# Patient Record
Sex: MALE | Race: WHITE | Employment: UNEMPLOYED | URBAN - METROPOLITAN AREA
[De-identification: names, ages, dates, MRNs, and addresses within clinical notes are randomized per-mention and may not be internally consistent; named-entity substitution may affect disease eponyms.]

---

## 2018-05-11 ENCOUNTER — HOSPITAL ENCOUNTER (EMERGENCY)
Age: 58
Discharge: HOME | End: 2018-05-12
Attending: EMERGENCY MEDICINE
Payer: MEDICAID

## 2018-05-11 DIAGNOSIS — G89.29 OTHER CHRONIC PAIN: ICD-10-CM

## 2018-05-11 DIAGNOSIS — Z59.00 HOMELESS: ICD-10-CM

## 2018-05-11 DIAGNOSIS — I73.9 PAD (PERIPHERAL ARTERY DISEASE) (HCC): Primary | ICD-10-CM

## 2018-05-11 PROCEDURE — 99284 EMERGENCY DEPT VISIT MOD MDM: CPT

## 2018-05-11 RX ORDER — OXYCODONE HYDROCHLORIDE 5 MG/1
15 TABLET ORAL ONCE
Status: COMPLETED | OUTPATIENT
Start: 2018-05-12 | End: 2018-05-12

## 2018-05-11 SDOH — ECONOMIC STABILITY - HOUSING INSECURITY: HOMELESSNESS UNSPECIFIED: Z59.00

## 2018-05-12 ENCOUNTER — APPOINTMENT (OUTPATIENT)
Dept: GENERAL RADIOLOGY | Age: 58
End: 2018-05-12
Attending: EMERGENCY MEDICINE
Payer: MEDICAID

## 2018-05-12 VITALS
TEMPERATURE: 98 F | BODY MASS INDEX: 19.88 KG/M2 | OXYGEN SATURATION: 99 % | WEIGHT: 150 LBS | HEART RATE: 83 BPM | DIASTOLIC BLOOD PRESSURE: 64 MMHG | SYSTOLIC BLOOD PRESSURE: 136 MMHG | RESPIRATION RATE: 16 BRPM | HEIGHT: 73 IN

## 2018-05-12 LAB
ANION GAP SERPL CALC-SCNC: 8 MMOL/L (ref 3–18)
BASOPHILS # BLD: 0.1 K/UL (ref 0–0.06)
BASOPHILS NFR BLD: 1 % (ref 0–2)
BUN SERPL-MCNC: 8 MG/DL (ref 7–18)
BUN/CREAT SERPL: 12 (ref 12–20)
CALCIUM SERPL-MCNC: 9.1 MG/DL (ref 8.5–10.1)
CHLORIDE SERPL-SCNC: 102 MMOL/L (ref 100–108)
CO2 SERPL-SCNC: 30 MMOL/L (ref 21–32)
CREAT SERPL-MCNC: 0.67 MG/DL (ref 0.6–1.3)
DIFFERENTIAL METHOD BLD: ABNORMAL
EOSINOPHIL # BLD: 0.4 K/UL (ref 0–0.4)
EOSINOPHIL NFR BLD: 4 % (ref 0–5)
ERYTHROCYTE [DISTWIDTH] IN BLOOD BY AUTOMATED COUNT: 13.9 % (ref 11.6–14.5)
GLUCOSE SERPL-MCNC: 112 MG/DL (ref 74–99)
HCT VFR BLD AUTO: 36.3 % (ref 36–48)
HGB BLD-MCNC: 12.2 G/DL (ref 13–16)
LYMPHOCYTES # BLD: 1.8 K/UL (ref 0.9–3.6)
LYMPHOCYTES NFR BLD: 20 % (ref 21–52)
MCH RBC QN AUTO: 29 PG (ref 24–34)
MCHC RBC AUTO-ENTMCNC: 33.6 G/DL (ref 31–37)
MCV RBC AUTO: 86.2 FL (ref 74–97)
MONOCYTES # BLD: 0.9 K/UL (ref 0.05–1.2)
MONOCYTES NFR BLD: 9 % (ref 3–10)
NEUTS SEG # BLD: 6.1 K/UL (ref 1.8–8)
NEUTS SEG NFR BLD: 66 % (ref 40–73)
PLATELET # BLD AUTO: 373 K/UL (ref 135–420)
PMV BLD AUTO: 9.4 FL (ref 9.2–11.8)
POTASSIUM SERPL-SCNC: 3 MMOL/L (ref 3.5–5.5)
RBC # BLD AUTO: 4.21 M/UL (ref 4.7–5.5)
SODIUM SERPL-SCNC: 140 MMOL/L (ref 136–145)
WBC # BLD AUTO: 9.2 K/UL (ref 4.6–13.2)

## 2018-05-12 PROCEDURE — 80048 BASIC METABOLIC PNL TOTAL CA: CPT | Performed by: EMERGENCY MEDICINE

## 2018-05-12 PROCEDURE — 85025 COMPLETE CBC W/AUTO DIFF WBC: CPT | Performed by: EMERGENCY MEDICINE

## 2018-05-12 PROCEDURE — 73630 X-RAY EXAM OF FOOT: CPT

## 2018-05-12 PROCEDURE — 74011250637 HC RX REV CODE- 250/637: Performed by: EMERGENCY MEDICINE

## 2018-05-12 RX ORDER — OXYCODONE AND ACETAMINOPHEN 5; 325 MG/1; MG/1
1 TABLET ORAL
Qty: 6 TAB | Refills: 0 | Status: SHIPPED | OUTPATIENT
Start: 2018-05-12 | End: 2018-05-18 | Stop reason: SDUPTHER

## 2018-05-12 RX ORDER — POTASSIUM CHLORIDE 20 MEQ/1
TABLET, EXTENDED RELEASE ORAL
Status: DISCONTINUED
Start: 2018-05-12 | End: 2018-05-12 | Stop reason: HOSPADM

## 2018-05-12 RX ORDER — POTASSIUM CHLORIDE 20 MEQ/1
40 TABLET, EXTENDED RELEASE ORAL
Status: COMPLETED | OUTPATIENT
Start: 2018-05-12 | End: 2018-05-12

## 2018-05-12 RX ADMIN — POTASSIUM CHLORIDE 40 MEQ: 20 TABLET, EXTENDED RELEASE ORAL at 01:31

## 2018-05-12 RX ADMIN — OXYCODONE HYDROCHLORIDE 15 MG: 5 TABLET ORAL at 00:07

## 2018-05-12 NOTE — DISCHARGE INSTRUCTIONS
Learning About Peripheral Arterial Disease of the Legs  What is peripheral arterial disease? Peripheral arterial disease (PAD) is narrowing or blockage of arteries in your arms and legs. The most common cause of PAD is the buildup of plaque on the inside of arteries. Plaque is made of extra cholesterol, calcium, and other material in your blood. Over time, plaque builds up in the walls of the arteries, including those that supply blood to your legs. This buildup leads to poor blood flow. When you have PAD, you have a risk of having plaque in other arteries in your body. This raises your risk of a heart attack and stroke. This information focuses on peripheral arterial disease of the legs, the area where it is most common. When you have PAD of the legs and you walk or exercise, your leg muscles may not get enough blood. This may cause symptoms, such as leg pain during exercise. Peripheral arterial disease is also called peripheral vascular disease. What are the symptoms? Many people who have PAD do not have any symptoms. But if you have symptoms, you may have weak or tired legs, difficulty walking or balancing, or pain. If you have pain, you might feel a tight, aching, or squeezing pain in the calf, thigh, or buttock. This pain usually happens after you have walked a certain distance. The pain goes away if you stop walking. This pain is called intermittent claudication. If PAD gets worse, you may have other symptoms that are caused by poor blood flow to your legs and feet. You may have:  · Cold or numb feet or toes. · Sores that are slow to heal.  · Leg or foot pain while you are at rest.  · Feet and toes that become pale from exercise or when elevated. · Feet that turn red when dangled. · Blue or purple marks on your legs, feet, or toes. How can you prevent PAD? · Quit smoking. Quitting smoking is one of the best things you can do to help prevent PAD.  If you need help quitting, talk to your doctor about stop-smoking programs and medicines. These can increase your chances of quitting for good. · Stay at a healthy weight. · Manage other health problems, including diabetes, high blood pressure, and high cholesterol. · Be physically active. Get at least 30 minutes of exercise on most days of the week. Walking is a good choice. You also may want to do other activities, such as running, swimming, cycling, or playing tennis or team sports. · Eat a variety of heart-healthy foods. ¨ Eat fruits, vegetables, whole grains (like oatmeal), dried beans and peas, nuts and seeds, soy products (like tofu), and fat-free or low-fat dairy products. ¨ Replace butter, margarine, and hydrogenated or partially hydrogenated oils with olive and canola oils. (Canola oil margarine without trans fat is fine.)  ¨ Replace red meat with fish, poultry, and soy protein (like tofu). ¨ Limit processed and packaged foods like chips, crackers, and cookies. How is PAD treated? Your doctor may suggest ways to relieve symptoms and lower your risk of heart attack and stroke. These may include:  · Quitting smoking. It's one of the most important things you can do. If you need help quitting, talk to your doctor about stop-smoking programs and medicines. These can increase your chances of quitting for good. · Eating heart-healthy foods. · Staying at a healthy weight. Lose weight if you need to. · Regular exercise (if your doctor says it's safe). Try walking, swimming, or biking for at least 30 minutes on most, if not all, days of the week. If you have leg symptoms when you exercise, your doctor might recommend a specialized exercise program that may relieve symptoms. The goal is to be able to walk farther without pain. · Medicines that help manage other problems such as high blood pressure and high cholesterol. · Medicine, such as aspirin, that prevents blood clots which could cause a heart attack or stroke.   · Procedures, such as opening narrowed or blocked arteries (angioplasty) or using healthy blood vessels to create detours around narrowed or blocked arteries (bypass surgery). Follow-up care is a key part of your treatment and safety. Be sure to make and go to all appointments, and call your doctor if you are having problems. It's also a good idea to know your test results and keep a list of the medicines you take. Where can you learn more? Go to http://krystal-jenny.info/. Enter S971 in the search box to learn more about \"Learning About Peripheral Arterial Disease of the Legs. \"  Current as of: September 21, 2016  Content Version: 11.4  © 4009-4890 Healthwise, [employer]. Care instructions adapted under license by [employer] (which disclaims liability or warranty for this information). If you have questions about a medical condition or this instruction, always ask your healthcare professional. [place] [address] any warranty or liability for your use of this information.

## 2018-05-12 NOTE — ED PROVIDER NOTES
EMERGENCY DEPARTMENT HISTORY AND PHYSICAL EXAM    11:57 PM      Date: 5/11/2018  Patient Name: Zhang Rosen    History of Presenting Illness     Chief Complaint   Patient presents with    Foot Pain         History Provided By: Patient    Chief Complaint: Bilateral leg and foot pain  Duration:  Days (x1)  Timing:  Acute  Location: Bilateral legs and feet  Quality: N/A  Severity: N/A  Modifying Factors: Initial relief with oxycodone and tramadol  Associated Symptoms: denies fever and cough      Additional History (Context): Zhang Rosen is a 63 y.o. male with hypertension who presents to the ED c/o bilateral leg and foot pain onset today. Pt is two months s/p left transmetatarsal amputation; pt was not on antibiotics for the first four months following surgery however was eventually put on a course of antibiotics for infection of amputation site. Pt was staying at [facility] following this surgery but was discharged two days ago; pt has been living in his car since being discharged and does not have anywhere to go at this time. Pt was taking oxycodone and tramadol while at [facility], which controlled his pain well, however pt has not had these medications the last two days. Pt denies fever and cough. PCP: Robert Manzo MD        Past History     Past Medical History:  Past Medical History:   Diagnosis Date    Hypertension        Past Surgical History:  History reviewed. No pertinent surgical history. Family History:  History reviewed. No pertinent family history. Social History:  Social History   Substance Use Topics    Smoking status: Current Every Day Smoker    Smokeless tobacco: Never Used    Alcohol use No       Allergies:  No Known Allergies      Review of Systems     Review of Systems   Constitutional: Negative for fever. Respiratory: Negative for cough.     Musculoskeletal:        Positive for bilateral leg and foot pain   All other systems reviewed and are negative. Physical Exam     Visit Vitals    /64    Pulse 83    Temp 98 °F (36.7 °C)    Resp 16    Ht 6' 1\" (1.854 m)    Wt 68 kg (150 lb)    SpO2 99%    BMI 19.79 kg/m2         Physical Exam   Constitutional:   General:  Well-developed, well-nourished, no apparent distress. Head:  Normocephalic atraumatic. Eyes:  Pupils midrange extraocular movements intact. No pallor or conjunctival injection. Nose:  No rhinorrhea, inspection grossly normal.    Ears:  Grossly normal to inspection, no discharge. Mouth:  Mucous membranes moist, no appreciable intraoral lesion. Neck/Back:  Trachea midline, no asymmetry. Chest:  Grossly normal inspection, symmetric chest rise. Pulmonary:  Clear to auscultation bilaterally no wheezes rhonchi or rales. Cardiovascular:  S1-S2 no murmurs rubs or gallops. Abdomen: Soft, nontender, nondistended no guarding rebound or peritoneal signs. Extremities:  Grossly normal to inspection, peripheral pulses intact  LEFT lower extremity covered with foul smelling dressing, status post TMA, with dehiscence, granulation tissue present in the LEFT TMA, 2+ dorsalis pedis pulse, RIGHT lower extremity, faintly palpable posterior tibial pulse, very mild pallor, sluggish cap refill but no erythema no pain on palpation  Neurologic:  Alert and oriented no appreciable focal neurologic deficit. Skin:  Warm and dry  Psychiatric:  Grossly normal mood and affect. Nursing note reviewed, vital signs reviewed.            Diagnostic Study Results     Labs -  Recent Results (from the past 12 hour(s))   CBC WITH AUTOMATED DIFF    Collection Time: 05/12/18 12:21 AM   Result Value Ref Range    WBC 9.2 4.6 - 13.2 K/uL    RBC 4.21 (L) 4.70 - 5.50 M/uL    HGB 12.2 (L) 13.0 - 16.0 g/dL    HCT 36.3 36.0 - 48.0 %    MCV 86.2 74.0 - 97.0 FL    MCH 29.0 24.0 - 34.0 PG    MCHC 33.6 31.0 - 37.0 g/dL    RDW 13.9 11.6 - 14.5 %    PLATELET 588 595 - 655 K/uL    MPV 9.4 9.2 - 11.8 FL NEUTROPHILS 66 40 - 73 %    LYMPHOCYTES 20 (L) 21 - 52 %    MONOCYTES 9 3 - 10 %    EOSINOPHILS 4 0 - 5 %    BASOPHILS 1 0 - 2 %    ABS. NEUTROPHILS 6.1 1.8 - 8.0 K/UL    ABS. LYMPHOCYTES 1.8 0.9 - 3.6 K/UL    ABS. MONOCYTES 0.9 0.05 - 1.2 K/UL    ABS. EOSINOPHILS 0.4 0.0 - 0.4 K/UL    ABS. BASOPHILS 0.1 (H) 0.0 - 0.06 K/UL    DF AUTOMATED     METABOLIC PANEL, BASIC    Collection Time: 05/12/18 12:21 AM   Result Value Ref Range    Sodium 140 136 - 145 mmol/L    Potassium 3.0 (L) 3.5 - 5.5 mmol/L    Chloride 102 100 - 108 mmol/L    CO2 30 21 - 32 mmol/L    Anion gap 8 3.0 - 18 mmol/L    Glucose 112 (H) 74 - 99 mg/dL    BUN 8 7.0 - 18 MG/DL    Creatinine 0.67 0.6 - 1.3 MG/DL    BUN/Creatinine ratio 12 12 - 20      GFR est AA >60 >60 ml/min/1.73m2    GFR est non-AA >60 >60 ml/min/1.73m2    Calcium 9.1 8.5 - 10.1 MG/DL       Radiologic Studies -   XR FOOT LT MIN 3 V        S/p TMA with no prior xray to compare as read by Severa Earthly, MD 12:43 AM          Medical Decision Making   I am the first provider for this patient. I reviewed the vital signs, available nursing notes, past medical history, past surgical history, family history and social history. Vital Signs-Reviewed the patient's vital signs. Pulse Oximetry Analysis -  97% on room air (Non-hypoxic)     Cardiac Monitor:  Rate: 83  Rhythm:  Normal Sinus Rhythm     Records Reviewed: Nursing Notes and Old Medical Records (Time of Review: 11:57 PM)    ED Course: Progress Notes, Reevaluation, and Consults:    ED course:  Patient with a history of chronic pain, peripheral arterial disease and LEFT TMA presents with continued pain, was discharged from his rehabilitation facility with no place to live and no pain medication.   Per EMR he does take oxycodone 15 mg, wound was seen recently by his podiatrist last vascular doctor has a known dehiscence and has finished a course of antibiotic at his care facility    Given pain medication    Labs significant for hypokalemia replaced by mouth white count not elevated    No indication for admission, patient was referred back to his primary vascular or PCP or pain management for further management of his chronic pain and for living facility, had a plan to see consulate on Monday for a place to live    Patient's presentation, history, physical exam and laboratory evaluations were reviewed. At this time patient was felt to be stable for outpatient management and follow with primary care/specialist.  Patient was instructed to return to the emergency department with any concerns. Disposition:    Discharged home      Portions of this chart were created with Dragon medical speech to text program.   Unrecognized errors may be present. Follow-up Information     Follow up With Details Comments Ruby Juárez MD In 2 days  [facility].  [address] [place] [address] Call in 2 days  [address] [address]) [phone]    [facility] EMERGENCY DEPT  As needed, If symptoms worsen [address]  [phone]           Discharge Medication List as of 5/12/2018  1:25 AM      START taking these medications    Details   oxyCODONE-acetaminophen (PERCOCET) 5-325 mg per tablet Take 1 Tab by mouth every four (4) hours as needed for Pain. Max Daily Amount: 6 Tabs., Print, Disp-6 Tab, R-0           _______________________________    Attestations:  Scribe [address] [place] [address] acting as a scribe for and in the presence of Natasha Mart MD      May 11, 2018 at 11:57 PM       Provider Attestation:      I personally performed the services described in the documentation, reviewed the documentation, as recorded by the scribe in my presence, and it accurately and completely records my words and actions.  May 11, 2018 at 11:57 PM - Natasha Mart MD    _______________________________

## 2018-05-14 ENCOUNTER — OFFICE VISIT (OUTPATIENT)
Dept: INTERNAL MEDICINE CLINIC | Age: 58
End: 2018-05-14

## 2018-05-14 VITALS
HEIGHT: 74 IN | BODY MASS INDEX: 18.61 KG/M2 | TEMPERATURE: 97.3 F | RESPIRATION RATE: 16 BRPM | OXYGEN SATURATION: 99 % | SYSTOLIC BLOOD PRESSURE: 171 MMHG | HEART RATE: 90 BPM | DIASTOLIC BLOOD PRESSURE: 103 MMHG | WEIGHT: 145 LBS

## 2018-05-14 RX ORDER — POLYETHYLENE GLYCOL 3350 17 G/17G
17 POWDER, FOR SOLUTION ORAL DAILY
COMMUNITY
End: 2019-01-11

## 2018-05-14 RX ORDER — VENLAFAXINE 75 MG/1
25 TABLET ORAL DAILY
COMMUNITY
End: 2018-05-18 | Stop reason: DRUGHIGH

## 2018-05-14 RX ORDER — LANOLIN ALCOHOL/MO/W.PET/CERES
CREAM (GRAM) TOPICAL
COMMUNITY
End: 2019-01-11

## 2018-05-14 RX ORDER — AMLODIPINE BESYLATE 10 MG/1
TABLET ORAL DAILY
COMMUNITY
End: 2018-05-18 | Stop reason: SDUPTHER

## 2018-05-14 NOTE — PROGRESS NOTES
Jasper aBss presents today for   Chief Complaint   Patient presents with    New Patient     Pt stated he d/c himself from [facility] Friday. Pt stated he went to [address] [place] [address] and was referred him to to long term care and they referred him here. Pt stated he needs refills on his pain meds. Jasper Bass preferred language for health care discussion is english/other. Is someone accompanying this pt? No    Is the patient using any DME equipment during OV? Yes; orthopedic shoe left foot    Depression Screening:  Completed     Learning Assessment:  Completed    Abuse Screening:  No flowsheet data found. Fall Risk  No flowsheet data found. Health Maintenance reviewed and discussed per provider. Yes    Jasper Bass is due for HM. Deferred. Advance Directive:  1. Do you have an advance directive in place? Patient Reply: No     2. If not, would you like material regarding how to put one in place? Patient Reply: No     Coordination of Care:  1. Have you been to the ER, urgent care clinic since your last visit? Hospitalized since your last visit? Yes;  General for sx     2. Have you seen or consulted any other health care providers outside of the [address] [place] [address] since your last visit? Include any pap smears or colon screening. Dr Gi Wilson and Dr Nayak Hascolette          A user error has taken place.

## 2018-05-18 ENCOUNTER — OFFICE VISIT (OUTPATIENT)
Dept: INTERNAL MEDICINE CLINIC | Age: 58
End: 2018-05-18

## 2018-05-18 VITALS
HEIGHT: 74 IN | OXYGEN SATURATION: 98 % | DIASTOLIC BLOOD PRESSURE: 93 MMHG | HEART RATE: 82 BPM | BODY MASS INDEX: 18.3 KG/M2 | RESPIRATION RATE: 18 BRPM | TEMPERATURE: 97.6 F | SYSTOLIC BLOOD PRESSURE: 165 MMHG | WEIGHT: 142.6 LBS

## 2018-05-18 DIAGNOSIS — G89.29 OTHER CHRONIC PAIN: ICD-10-CM

## 2018-05-18 DIAGNOSIS — F33.9 RECURRENT MAJOR DEPRESSIVE DISORDER, REMISSION STATUS UNSPECIFIED (HCC): ICD-10-CM

## 2018-05-18 DIAGNOSIS — I10 ESSENTIAL HYPERTENSION: Primary | ICD-10-CM

## 2018-05-18 DIAGNOSIS — F17.200 TOBACCO DEPENDENCE: ICD-10-CM

## 2018-05-18 DIAGNOSIS — Z59.00 HOMELESSNESS: ICD-10-CM

## 2018-05-18 DIAGNOSIS — I73.9 PAD (PERIPHERAL ARTERY DISEASE) (HCC): ICD-10-CM

## 2018-05-18 RX ORDER — TRAMADOL HYDROCHLORIDE 50 MG/1
50 TABLET ORAL
COMMUNITY
Start: 2018-03-15 | End: 2018-05-18

## 2018-05-18 RX ORDER — OXYCODONE AND ACETAMINOPHEN 5; 325 MG/1; MG/1
1 TABLET ORAL
Qty: 90 TAB | Refills: 0 | Status: SHIPPED | OUTPATIENT
Start: 2018-05-18 | End: 2018-06-08 | Stop reason: SDUPTHER

## 2018-05-18 RX ORDER — OXYCODONE HYDROCHLORIDE 15 MG/1
15 TABLET ORAL
COMMUNITY
Start: 2018-03-15 | End: 2018-05-18

## 2018-05-18 RX ORDER — AMLODIPINE BESYLATE 10 MG/1
10 TABLET ORAL DAILY
Qty: 90 TAB | Refills: 3 | Status: SHIPPED | OUTPATIENT
Start: 2018-05-18 | End: 2019-01-11

## 2018-05-18 RX ORDER — VENLAFAXINE HYDROCHLORIDE 150 MG/1
150 CAPSULE, EXTENDED RELEASE ORAL DAILY
Qty: 90 CAP | Refills: 3 | Status: SHIPPED | OUTPATIENT
Start: 2018-05-18 | End: 2018-06-08 | Stop reason: SDUPTHER

## 2018-05-18 SDOH — ECONOMIC STABILITY - HOUSING INSECURITY: HOMELESSNESS UNSPECIFIED: Z59.00

## 2018-05-18 NOTE — PROGRESS NOTES
ROOM # [id number]  Identified pt with two pt identifiers(name and ). Reviewed record in preparation for visit and have obtained necessary documentation. Chief Complaint   Patient presents with   [address] [place] [address]     recent amputation of toes lt ft      Christiano Mckeon preferred language for health care discussion is english/other. Is the patient using any DME equipment during OV? Genoveva Whitney is due for:  Health Maintenance Due   Topic    Hepatitis C Screening     Pneumococcal 19-64 Medium Risk (1 of 1 - PPSV23)    DTaP/Tdap/Td series (1 - Tdap)    FOBT Q 1 YEAR AGE 50-75      Health Maintenance reviewed and discussed per provider  Please order/place referral if appropriate. Advance Directive:  1. Do you have an advance directive in place? Patient Reply: NO    2. If not, would you like material regarding how to put one in place? NO    Coordination of Care:  1. Have you been to the ER, urgent care clinic since your last visit? Hospitalized since your last visit? YES [facility]    2. Have you seen or consulted any other health care providers outside of the [address] [place] [address] since your last visit? Include any pap smears or colon screening. NO    Patient is accompanied by self I have received verbal consent from Christiano Mckeon to discuss any/all medical information while they are present in the room. Learning Assessment:  Learning Assessment 2018   PRIMARY LEARNER Patient   HIGHEST LEVEL OF EDUCATION - PRIMARY LEARNER  GRADUATED HIGH SCHOOL OR GED   BARRIERS PRIMARY LEARNER NONE   CO-LEARNER CAREGIVER No   PRIMARY LANGUAGE ENGLISH   LEARNER PREFERENCE PRIMARY DEMONSTRATION   ANSWERED BY patient   RELATIONSHIP SELF     Depression Screening:  PHQ over the last two weeks 2018   Little interest or pleasure in doing things Not at all Not at all   Feeling down, depressed or hopeless Not at all Not at all   Total Score PHQ 2 0 0     Abuse Screening:  No flowsheet data found.    Risk  No flowsheet data found.

## 2018-05-18 NOTE — PATIENT INSTRUCTIONS
Learning About Prescribed Opioid Medicine for Chronic Pain  Introduction    Opioids are medicines used to relieve moderate to severe pain. Examples include fentanyl, hydrocodone, morphine, and oxycodone. Heroin is an example of an illegal opioid. They may be used for pain that may go on for a long time, such as for cancer or arthritis. Opioids relieve pain by changing the way your body feels pain and the way you feel about pain. They don't cure a health problem. But they do help you manage the pain. Your doctor will talk with you about how they fit into your overall treatment plan. Doctors follow a strict set of rules for giving opioids to their patients who have long-term (chronic) pain. This is because these are powerful medicines. They can cause problems if they are not used correctly. They can also be misused. Getting a prescription for an opioid may seem hard to do. You may feel like your doctor doesn't trust you. Your doctor understands this. But the rules are the same for everyone. They help make sure that the medicine will be used safely. What happens before you get a prescription? Your doctor may talk with you about your pain history. He or she may ask about your family history. You may have a physical exam. You may also see a pain specialist. This helps your doctor decide if an opioid is right for you. Your doctor will also review your history of opioid use. Most states have a program that tracks prescriptions. If your state does, your doctor will check it to see if you've had prescriptions for opioids before. You may have a urine test to check for opioids in your system. If you're a woman, you may have a pregnancy test. Opioids are not safe to take if you are pregnant. When your doctor prescribes the medicine, he or she will discuss your treatment plan with you. That includes the risks and benefits of opioid medicines.  Your doctor will tell you how much pain relief and improved ability to function you can expect. The goal is to take the lowest dose that improves your pain for the shortest amount of time. Your doctor will also talk with you about other things you can do to help relieve pain. Your doctor may suggest different medicines or other options, such as steroid injections, ice or heat, physical therapy, or ways to reduce stress. It's always a good idea to ask questions before you get a new prescription. This is especially true when you get a prescription for an opioid. Some questions to ask your doctor include:  · Why do I need this medicine? Is it right for me? · How long should I take this medicine? · What can I do to help with side effects, like constipation? · How should I store this medicine? What should I do with leftover or unused opioid medicine? · Do I need a prescription for naloxone? You and your doctor will talk about your responsibilities. You'll both sign a written agreement. You will agree to take your medicine exactly as your doctor tells you to. Nikki Stanley also agree to be careful with it and not to share it with others. What happens after you start to use your medicine? Regular follow-up visits to your doctor will help you and your doctor make sure that the medicine is the right treatment for your pain. At every visit, your doctor will check these things:  · Is your pain being controlled? · Are you better able to function and be active? · Are you having any side effects, like constipation, nausea, or being too sleepy? · Are the non-opioid pain control measures working? Are there other things you can try? · Are there any signs that you are misusing your medicine? While you are taking an opioid, you may have drug tests and prescription history checks from time to time. Follow-up care is a key part of your treatment and safety. Be sure to make and go to all appointments, and call your doctor if you are having problems.  It's also a good idea to know your test results and keep a list of the medicines you take. Where can you learn more? Go to http://krystal-jenny.info/. Enter D374 in the search box to learn more about \"Learning About Prescribed Opioid Medicine for Chronic Pain. \"  Current as of: October 14, 2016  Content Version: 11.4  © 8763-6272 [employer]. Care instructions adapted under license by [employer] (which disclaims liability or warranty for this information). If you have questions about a medical condition or this instruction, always ask your healthcare professional. [address] any warranty or liability for your use of this information. Stopping Smoking: Care Instructions  Your Care Instructions    Cigarette smokers crave the nicotine in cigarettes. Giving it up is much harder than simply changing a habit. Your body has to stop craving the nicotine. It is hard to quit, but you can do it. There are many tools that people use to quit smoking. You may find that combining tools works best for you. There are several steps to quitting. First you get ready to quit. Then you get support to help you. After that, you learn new skills and behaviors to become a nonsmoker. For many people, a necessary step is getting and using medicine. Your doctor will help you set up the plan that best meets your needs. You may want to attend a smoking cessation program to help you quit smoking. When you choose a program, look for one that has proven success. Ask your doctor for ideas. You will greatly increase your chances of success if you take medicine as well as get counseling or join a cessation program.  Some of the changes you feel when you first quit tobacco are uncomfortable. Your body will miss the nicotine at first, and you may feel short-tempered and grumpy. You may have trouble sleeping or concentrating. Medicine can help you deal with these symptoms.  You may struggle with changing your smoking habits and rituals. The last step is the tricky one: Be prepared for the smoking urge to continue for a time. This is a lot to deal with, but keep at it. You will feel better. Follow-up care is a key part of your treatment and safety. Be sure to make and go to all appointments, and call your doctor if you are having problems. It's also a good idea to know your test results and keep a list of the medicines you take. How can you care for yourself at home? · Ask your family, friends, and coworkers for support. You have a better chance of quitting if you have help and support. · Join a support group, such as Nicotine Anonymous, for people who are trying to quit smoking. · Consider signing up for a smoking cessation program, such as the American [employer] Association's Freedom from Smoking program.  · Set a quit date. Pick your date carefully so that it is not right in the middle of a big deadline or stressful time. Once you quit, do not even take a puff. Get rid of all ashtrays and lighters after your last cigarette. Clean your house and your clothes so that they do not smell of smoke. · Learn how to be a nonsmoker. Think about ways you can avoid those things that make you reach for a cigarette. ¨ Avoid situations that put you at greatest risk for smoking. For some people, it is hard to have a drink with friends without smoking. For others, they might skip a coffee break with coworkers who smoke. ¨ Change your daily routine. Take a different route to work or eat a meal in a different place. · Cut down on stress. Calm yourself or release tension by doing an activity you enjoy, such as reading a book, taking a hot bath, or gardening. · Talk to your doctor or pharmacist about nicotine replacement therapy, which replaces the nicotine in your body. You still get nicotine but you do not use tobacco. Nicotine replacement products help you slowly reduce the amount of nicotine you need.  These products come in several forms, many of them available over-the-counter:  ¨ Nicotine patches  ¨ Nicotine gum and lozenges  ¨ Nicotine inhaler  · Ask your doctor about bupropion (Wellbutrin) or varenicline (Chantix), which are prescription medicines. They do not contain nicotine. They help you by reducing withdrawal symptoms, such as stress and anxiety. · Some people find hypnosis, acupuncture, and massage helpful for ending the smoking habit. · Eat a healthy diet and get regular exercise. Having healthy habits will help your body move past its craving for nicotine. · Be prepared to keep trying. Most people are not successful the first few times they try to quit. Do not get mad at yourself if you smoke again. Make a list of things you learned and think about when you want to try again, such as next week, next month, or next year. Where can you learn more? Go to http://krystalRummble Labsjenny.info/. Enter N053 in the search box to learn more about \"Stopping Smoking: Care Instructions. \"  Current as of: March 20, 2017  Content Version: 11.4  © 9507-7001 [employer]. Care instructions adapted under license by [employer] (which disclaims liability or warranty for this information). If you have questions about a medical condition or this instruction, always ask your healthcare professional. [address] any warranty or liability for your use of this information. Body Mass Index: Care Instructions  Your Care Instructions    Body mass index (BMI) can help you see if your weight is raising your risk for health problems. It uses a formula to compare how much you weigh with how tall you are. · A BMI lower than 18.5 is considered underweight. · A BMI between 18.5 and 24.9 is considered healthy. · A BMI between 25 and 29.9 is considered overweight. A BMI of 30 or higher is considered obese.   If your BMI is in the normal range, it means that you have a lower risk for weight-related health problems. If your BMI is in the overweight or obese range, you may be at increased risk for weight-related health problems, such as high blood pressure, heart disease, stroke, arthritis or joint pain, and diabetes. If your BMI is in the underweight range, you may be at increased risk for health problems such as fatigue, lower protection (immunity) against illness, muscle loss, bone loss, hair loss, and hormone problems. BMI is just one measure of your risk for weight-related health problems. You may be at higher risk for health problems if you are not active, you eat an unhealthy diet, or you drink too much alcohol or use tobacco products. Follow-up care is a key part of your treatment and safety. Be sure to make and go to all appointments, and call your doctor if you are having problems. It's also a good idea to know your test results and keep a list of the medicines you take. How can you care for yourself at home? · Practice healthy eating habits. This includes eating plenty of fruits, vegetables, whole grains, lean protein, and low-fat dairy. · If your doctor recommends it, get more exercise. Walking is a good choice. Bit by bit, increase the amount you walk every day. Try for at least 30 minutes on most days of the week. · Do not smoke. Smoking can increase your risk for health problems. If you need help quitting, talk to your doctor about stop-smoking programs and medicines. These can increase your chances of quitting for good. · Limit alcohol to 2 drinks a day for men and 1 drink a day for women. Too much alcohol can cause health problems. If you have a BMI higher than 25  · Your doctor may do other tests to check your risk for weight-related health problems. This may include measuring the distance around your waist. A waist measurement of more than 40 inches in men or 35 inches in women can increase the risk of weight-related health problems.   · Talk with your doctor about steps you can take to stay healthy or improve your health. You may need to make lifestyle changes to lose weight and stay healthy, such as changing your diet and getting regular exercise. If you have a BMI lower than 18.5  · Your doctor may do other tests to check your risk for health problems. · Talk with your doctor about steps you can take to stay healthy or improve your health. You may need to make lifestyle changes to gain or maintain weight and stay healthy, such as getting more healthy foods in your diet and doing exercises to build muscle. Where can you learn more? Go to http://krystal-jenny.info/. Enter S176 in the search box to learn more about \"Body Mass Index: Care Instructions. \"  Current as of: October 13, 2016  Content Version: 11.4  © 2697-5762 [employer]. Care instructions adapted under license by [employer] (which disclaims liability or warranty for this information). If you have questions about a medical condition or this instruction, always ask your healthcare professional. [address] any warranty or liability for your use of this information.

## 2018-05-18 NOTE — MR AVS SNAPSHOT
[address] [place] [address] 
 
 
 [address] [address] 
[phone] Patient: Spencer Bhakta MRN: [id number] PRR:[id number]/8/[id number] Visit Information Date & Time Provider Department Dept. Phone Encounter #  
 5/18/2018  3:00 PM Gayle Machado, [facility] [id number] Follow-up Instructions Return in about 1 month (around 6/18/2018), or if symptoms worsen or fail to improve, for hypertension, pain. Your Appointments 5/18/2018  3:00 PM  
Office Visit with MD ROSI Sethi [facility]) Appt Note: Est Care. recent amputation of toes (L) foot. [address] [facility]  
  
   
 [address] [facility] Upcoming Health Maintenance Date Due Hepatitis C Screening 1960 Pneumococcal 19-64 Medium Risk (1 of 1 - PPSV23) 3/1/1980 DTaP/Tdap/Td series (1 - Tdap) 3/1/1982 FOBT Q 1 YEAR AGE 50-75 3/1/2011 Influenza Age 5 to Adult 8/1/2018 Allergies as of 5/18/2018  Review Complete On: 5/18/2018 By: Kamran Perez No Known Allergies Current Immunizations  Never Reviewed No immunizations on file. Not reviewed this visit You Were Diagnosed With   
  
 Codes Comments Essential hypertension    -  Primary ICD-10-CM: I10 
ICD-9-CM: 401.9 PAD (peripheral artery disease) (HCC)     ICD-10-CM: I73.9 ICD-9-CM: 443.9 Toe amputation status, left ([facility]     ICD-10-CM: G65.043 ICD-9-CM: V49.72 Recurrent major depressive disorder, remission status unspecified ([facility]     ICD-10-CM: F33.9 ICD-9-CM: 296.30 Other chronic pain     ICD-10-CM: G89.29 ICD-9-CM: 338.29 Homelessness     ICD-10-CM: Z59.0 ICD-9-CM: V60.0 Tobacco dependence     ICD-10-CM: L24.380 ICD-9-CM: 305.1 Vitals BP Pulse Temp Resp Height(growth percentile) Weight(growth percentile) (!) 165/93 (BP 1 Location: Left arm, BP Patient Position: Sitting) 82 97.6 °F (36.4 °C) (Oral) 18 6' 2\" (1.88 m) 142 lb 9.6 oz (64.7 kg) SpO2 BMI Smoking Status 98% 18.31 kg/m2 Current Every Day Smoker Vitals History BMI and BSA Data Body Mass Index Body Surface Area  
 18.31 kg/m 2 1.84 m 2 Preferred Pharmacy Pharmacy Name Phone [id number] [place], [address] [facility] Lindsay Young [phone] Your Updated Medication List  
  
   
This list is accurate as of 5/18/18  9:47 AM.  Always use your most recent med list. amLODIPine 10 mg tablet Commonly known as:  Edelmira Anita Take 1 Tab by mouth daily. Indications: hypertension  
  
 melatonin 3 mg tablet Take  by mouth. MIRALAX 17 gram packet Generic drug:  polyethylene glycol Take 17 g by mouth daily. MORPHINE  
1 Tab two (2) times a day. Morphine sulfate  15 mg  
  
 oxyCODONE-acetaminophen 5-325 mg per tablet Commonly known as:  PERCOCET Take 1 Tab by mouth every eight (8) hours as needed for Pain. Max Daily Amount: 3 Tabs. therapeutic multivitamin-minerals tablet Commonly known as:  THERAGRAN-M Take 1 Tab by Mouth Once a Day. venlafaxine- mg capsule Commonly known as:  EFFEXOR-XR Take 1 Cap by mouth daily. Indications: ANXIETY WITH DEPRESSION Prescriptions Printed Refills  
 oxyCODONE-acetaminophen (PERCOCET) 5-325 mg per tablet 0 Sig: Take 1 Tab by mouth every eight (8) hours as needed for Pain. Max Daily Amount: 3 Tabs. Class: Print Route: Oral  
  
Prescriptions Sent to Pharmacy Refills  
 amLODIPine (NORVASC) 10 mg tablet 3 Sig: Take 1 Tab by mouth daily. Indications: hypertension Class: Normal  
 Pharmacy: [address] [place] [address] #: [phone]  Route: Oral  
 venlafaxine-SR (EFFEXOR-XR) 150 mg capsule 3  
 Sig: Take 1 Cap by mouth daily. Indications: ANXIETY WITH DEPRESSION Class: Normal  
 Pharmacy: [address] [place] [address], [address] [facility] #: [phone] Route: Oral  
  
Follow-up Instructions Return in about 1 month (around 6/18/2018), or if symptoms worsen or fail to improve, for hypertension, pain. Patient Instructions Learning About Prescribed Opioid Medicine for Chronic Pain Introduction Opioids are medicines used to relieve moderate to severe pain. Examples include fentanyl, hydrocodone, morphine, and oxycodone. Heroin is an example of an illegal opioid. They may be used for pain that may go on for a long time, such as for cancer or arthritis. Opioids relieve pain by changing the way your body feels pain and the way you feel about pain. They don't cure a health problem. But they do help you manage the pain. Your doctor will talk with you about how they fit into your overall treatment plan. Doctors follow a strict set of rules for giving opioids to their patients who have long-term (chronic) pain. This is because these are powerful medicines. They can cause problems if they are not used correctly. They can also be misused. Getting a prescription for an opioid may seem hard to do. You may feel like your doctor doesn't trust you. Your doctor understands this. But the rules are the same for everyone. They help make sure that the medicine will be used safely. What happens before you get a prescription? Your doctor may talk with you about your pain history. He or she may ask about your family history. You may have a physical exam. You may also see a pain specialist. This helps your doctor decide if an opioid is right for you. Your doctor will also review your history of opioid use. Most states have a program that tracks prescriptions. If your state does, your doctor will check it to see if you've had prescriptions for opioids before. You may have a urine test to check for opioids in your system. If you're a woman, you may have a pregnancy test. Opioids are not safe to take if you are pregnant. When your doctor prescribes the medicine, he or she will discuss your treatment plan with you. That includes the risks and benefits of opioid medicines. Your doctor will tell you how much pain relief and improved ability to function you can expect. The goal is to take the lowest dose that improves your pain for the shortest amount of time. Your doctor will also talk with you about other things you can do to help relieve pain. Your doctor may suggest different medicines or other options, such as steroid injections, ice or heat, physical therapy, or ways to reduce stress. It's always a good idea to ask questions before you get a new prescription. This is especially true when you get a prescription for an opioid. Some questions to ask your doctor include: · Why do I need this medicine? Is it right for me? · How long should I take this medicine? · What can I do to help with side effects, like constipation? · How should I store this medicine? What should I do with leftover or unused opioid medicine? · Do I need a prescription for naloxone? You and your doctor will talk about your responsibilities. You'll both sign a written agreement. You will agree to take your medicine exactly as your doctor tells you to. Pauline Felix also agree to be careful with it and not to share it with others. What happens after you start to use your medicine? Regular follow-up visits to your doctor will help you and your doctor make sure that the medicine is the right treatment for your pain. At every visit, your doctor will check these things: · Is your pain being controlled? · Are you better able to function and be active? · Are you having any side effects, like constipation, nausea, or being too sleepy? · Are the non-opioid pain control measures working? Are there other things you can try? · Are there any signs that you are misusing your medicine? While you are taking an opioid, you may have drug tests and prescription history checks from time to time. Follow-up care is a key part of your treatment and safety. Be sure to make and go to all appointments, and call your doctor if you are having problems. It's also a good idea to know your test results and keep a list of the medicines you take. Where can you learn more? Go to http://krystal-jenny.info/. Enter D374 in the search box to learn more about \"Learning About Prescribed Opioid Medicine for Chronic Pain. \" Current as of: October 14, 2016 Content Version: 11.4 © 3298-6334 [employer]. Care instructions adapted under license by [employer] (which disclaims liability or warranty for this information). If you have questions about a medical condition or this instruction, always ask your healthcare professional. [place] [address] any warranty or liability for your use of this information. Stopping Smoking: Care Instructions Your Care Instructions Cigarette smokers crave the nicotine in cigarettes. Giving it up is much harder than simply changing a habit. Your body has to stop craving the nicotine. It is hard to quit, but you can do it. There are many tools that people use to quit smoking. You may find that combining tools works best for you. There are several steps to quitting. First you get ready to quit. Then you get support to help you. After that, you learn new skills and behaviors to become a nonsmoker. For many people, a necessary step is getting and using medicine. Your doctor will help you set up the plan that best meets your needs. You may want to attend a smoking cessation program to help you quit smoking. When you choose a program, look for one that has proven success.  Ask your doctor for ideas. You will greatly increase your chances of success if you take medicine as well as get counseling or join a cessation program. 
Some of the changes you feel when you first quit tobacco are uncomfortable. Your body will miss the nicotine at first, and you may feel short-tempered and grumpy. You may have trouble sleeping or concentrating. Medicine can help you deal with these symptoms. You may struggle with changing your smoking habits and rituals. The last step is the tricky one: Be prepared for the smoking urge to continue for a time. This is a lot to deal with, but keep at it. You will feel better. Follow-up care is a key part of your treatment and safety. Be sure to make and go to all appointments, and call your doctor if you are having problems. It's also a good idea to know your test results and keep a list of the medicines you take. How can you care for yourself at home? · Ask your family, friends, and coworkers for support. You have a better chance of quitting if you have help and support. · Join a support group, such as Nicotine Anonymous, for people who are trying to quit smoking. · Consider signing up for a smoking cessation program, such as the American Lung Association's Freedom from Smoking program. 
· Set a quit date. Pick your date carefully so that it is not right in the middle of a big deadline or stressful time. Once you quit, do not even take a puff. Get rid of all ashtrays and lighters after your last cigarette. Clean your house and your clothes so that they do not smell of smoke. · Learn how to be a nonsmoker. Think about ways you can avoid those things that make you reach for a cigarette. ¨ Avoid situations that put you at greatest risk for smoking. For some people, it is hard to have a drink with friends without smoking. For others, they might skip a coffee break with coworkers who smoke. ¨ Change your daily routine.  Take a different route to work or eat a meal in a different place. · Cut down on stress. Calm yourself or release tension by doing an activity you enjoy, such as reading a book, taking a hot bath, or gardening. · Talk to your doctor or pharmacist about nicotine replacement therapy, which replaces the nicotine in your body. You still get nicotine but you do not use tobacco. Nicotine replacement products help you slowly reduce the amount of nicotine you need. These products come in several forms, many of them available over-the-counter: ¨ Nicotine patches ¨ Nicotine gum and lozenges ¨ Nicotine inhaler · Ask your doctor about bupropion (Wellbutrin) or varenicline (Chantix), which are prescription medicines. They do not contain nicotine. They help you by reducing withdrawal symptoms, such as stress and anxiety. · Some people find hypnosis, acupuncture, and massage helpful for ending the smoking habit. · Eat a healthy diet and get regular exercise. Having healthy habits will help your body move past its craving for nicotine. · Be prepared to keep trying. Most people are not successful the first few times they try to quit. Do not get mad at yourself if you smoke again. Make a list of things you learned and think about when you want to try again, such as next week, next month, or next year. Where can you learn more? Go to http://krystal-jenny.info/. Enter W662 in the search box to learn more about \"Stopping Smoking: Care Instructions. \" Current as of: March 20, 2017 Content Version: 11.4 © 6556-1766 [employer]. Care instructions adapted under license by [employer] (which disclaims liability or warranty for this information). If you have questions about a medical condition or this instruction, always ask your healthcare professional. [place] [address] any warranty or liability for your use of this information. Introducing [facility]! [employer] introduces Vidmaker patient portal. Now you can access parts of your medical record, email your doctor's office, and request medication refills online. 1. In your internet browser, go to [email or web address] 2. Click on the First Time User? Click Here link in the Sign In box. You will see the New Member Sign Up page. 3. Enter your Vidmaker Access Code exactly as it appears below. You will not need to use this code after youve completed the sign-up process. If you do not sign up before the expiration date, you must request a new code. · Vidmaker Access Code: [email or web address] Expires: 8/12/2018  2:11 PM 
 
4. Enter the last four digits of your Social Security Number (xxxx) and Date of Birth (mm/dd/yyyy) as indicated and click Submit. You will be taken to the next sign-up page. 5. Create a Vidmaker ID. This will be your Vidmaker login ID and cannot be changed, so think of one that is secure and easy to remember. 6. Create a Vidmaker password. You can change your password at any time. 7. Enter your Password Reset Question and Answer. This can be used at a later time if you forget your password. 8. Enter your e-mail address. You will receive e-mail notification when new information is available in [address] 9. Click Sign Up. You can now view and download portions of your medical record. 10. Click the Download Summary menu link to download a portable copy of your medical information. If you have questions, please visit the Frequently Asked Questions section of the Vidmaker website. Remember, Vidmaker is NOT to be used for urgent needs. For medical emergencies, dial 911. Now available from your iPhone and Android! Please provide this summary of care documentation to your next provider. Your primary care clinician is listed as Kaylin Duran. If you have any questions after today's visit, please call [phone].

## 2018-05-18 NOTE — PROGRESS NOTES
HISTORY OF PRESENT ILLNESS  Glo Asencio is a 63 y.o. male. HPI Comments: 64 yo male here to establish care, f/u of HTN, chronic pain, depression. He has h/o PAD followed by vascular. Underwent L transmetatarsal amputation in March due to gangrene. Last seen for vascular f/u 5/7. He reports there are plans for arterial bypass on RLE within the next month. He was seen in the ED 5/12 after increased pain when discharged from SNF without medication. Was dispensed #6 of percocet which he states he was out of for the past few days. He denies alcohol use, but does admit to smoking marijuana once a week or so, last used this morning. Denies other illicits, but toxicology report in past at [facility] positive for cocaine. HTN: he reports he has also been out of amlodipine for the past week. States BP was well controlled when taking. H/o depression. Has been on Effexor for some time which is helpful but he is asking for Klonopin for increased anxiety sx. Has not been on this for years. He has had significant weight loss over the past few years. Has not had colonoscopy in the past. Anemia noted on [facility] labs while hospitalized; recent ED labs improved. He currently has been sleeping in his car. 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 He is on waiting list at [address] [place] [address] and plans to follow up with them today. HM: reports he is UTD with vaccines. Reports hep C screening done when in prison ~ 2 years ago in [place]. Has not had colo. Review of Systems   Constitutional: Positive for weight loss. Negative for chills and fever. HENT: Negative for congestion and ear pain. Eyes: Negative for blurred vision and pain. Respiratory: Negative for cough and shortness of breath. Cardiovascular: Negative for chest pain, palpitations and leg swelling.    Gastrointestinal: Negative for blood in stool, melena, nausea and vomiting. Genitourinary: Negative for frequency and urgency. Musculoskeletal: Positive for joint pain, myalgias and neck pain. Skin: Negative for itching and rash. Neurological: Negative for dizziness, tingling and headaches. Psychiatric/Behavioral: Negative for depression. The patient is not nervous/anxious. Past Medical History:   Diagnosis Date    Calculus of kidney     Depression     Hypertension     Sleep disorder     Vascular disorder of lower extremity     bilateral     Past Surgical History:   Procedure Laterality Date    HX AMPUTATION TOE Left     x 5 toes    HX HEENT       Current Outpatient Prescriptions on File Prior to Visit   Medication Sig Dispense Refill    venlafaxine (EFFEXOR) 75 mg tablet Take 25 mg by mouth daily.  melatonin 3 mg tablet Take  by mouth.  amLODIPine (NORVASC) 10 mg tablet Take  by mouth daily.  oxyCODONE-acetaminophen (PERCOCET) 5-325 mg per tablet Take 1 Tab by mouth every four (4) hours as needed for Pain. Max Daily Amount: 6 Tabs. 6 Tab 0    polyethylene glycol (MIRALAX) 17 gram packet Take 17 g by mouth daily.  (No Medication Selected) 1 Tab two (2) times a day. Morphine sulfate  15 mg       No current facility-administered medications on file prior to visit. No Known Allergies  Family History   Problem Relation Age of Onset    Arthritis-osteo Mother     Diabetes Father     Heart Disease Father     Psychiatric Disorder Brother      Social History   Substance Use Topics    Smoking status: Current Every Day Smoker    Smokeless tobacco: Never Used    Alcohol use No     Physical Exam   Constitutional: He appears well-developed and well-nourished. No distress.    BP (!) 165/93 (BP 1 Location: Left arm, BP Patient Position: Sitting)  Pulse 82  Temp 97.6 °F (36.4 °C) (Oral)   Resp 18  Ht 6' 2\" (1.88 m)  Wt 142 lb 9.6 oz (64.7 kg)  SpO2 98%  BMI 18.31 kg/m2     HENT:   Right Ear: Tympanic membrane and ear canal normal.   Left Ear: Tympanic membrane and ear canal normal.   Eyes: EOM are normal. Right eye exhibits no discharge. Left eye exhibits no discharge. No scleral icterus. Neck: Neck supple. Cardiovascular: Normal rate, regular rhythm and normal heart sounds. Exam reveals no gallop and no friction rub. No murmur heard. Pulmonary/Chest: Effort normal and breath sounds normal. No respiratory distress. He has no wheezes. He has no rales. Abdominal: Soft. He exhibits no distension. There is no tenderness. There is no rebound. Musculoskeletal: He exhibits no edema or tenderness. Lymphadenopathy:     He has no cervical adenopathy. Neurological: He is alert. He exhibits normal muscle tone. Skin: Skin is warm and dry. Psychiatric: He has a normal mood and affect. Lab Results   Component Value Date/Time    Sodium 140 05/12/2018 12:21 AM    Potassium 3.0 (L) 05/12/2018 12:21 AM    Chloride 102 05/12/2018 12:21 AM    CO2 30 05/12/2018 12:21 AM    Anion gap 8 05/12/2018 12:21 AM    Glucose 112 (H) 05/12/2018 12:21 AM    BUN 8 05/12/2018 12:21 AM    Creatinine 0.67 05/12/2018 12:21 AM    BUN/Creatinine ratio 12 05/12/2018 12:21 AM    GFR est AA >60 05/12/2018 12:21 AM    GFR est non-AA >60 05/12/2018 12:21 AM    Calcium 9.1 05/12/2018 12:21 AM     Lab Results   Component Value Date/Time    WBC 9.2 05/12/2018 12:21 AM    HGB 12.2 (L) 05/12/2018 12:21 AM    HCT 36.3 05/12/2018 12:21 AM    PLATELET 982 35/96/5389 12:21 AM    MCV 86.2 05/12/2018 12:21 AM   PVL at [facility] 5/7/18: Conclusions: Moderate right lower extremity arterial insufficiency based on the dorsalis pedis artery.    Moderate left lower extremity arterial waveforms by this resting exam.  When compared to the previous exam performed on 4/6/2018, there is no significant change. ASSESSMENT and PLAN    ICD-10-CM ICD-9-CM    1. Essential hypertension I10 401.9    2.  PAD (peripheral artery disease) ([facility]) I73.9 443.9 oxyCODONE-acetaminophen (PERCOCET) 5-325 mg per tablet   3. Toe amputation status, left (HCC) Z89.422 V49.72    4. Recurrent major depressive disorder, remission status unspecified ([facility]) F33.9 296.30    5. Other chronic pain G89.29 338.29    6. Homelessness Z59.0 V60.0    7. Tobacco dependence F17.200 305.1      Resume amlodipine which he states was effective when taking. Percocet refilled for his chronic pain related to PAD, amputation, but discussed that he should abstain from [facility], other illicits, and alcohol. Thanked him for his honesty regarding recent THC use and discussed safety impacts of this while using opioids for pain. He understand that UDS will be needed prior to further refills. Will increase Effexor to 150 mg. No benzos at this time due to safety implications. He is in process of finding shelter and declines further information on resources at this time. F/u with vascular surgery regarding PAD, amputation. Discussed importance of tobacco cessation. And provided information on AVS.   Needs colo but wishes to hold on this referral until after his vascular procedure. Old records requested.

## 2018-06-04 ENCOUNTER — TELEPHONE (OUTPATIENT)
Dept: INTERNAL MEDICINE CLINIC | Age: 58
End: 2018-06-04

## 2018-06-04 NOTE — TELEPHONE ENCOUNTER
Patient requesting new prescription of Percocet.   Due to recent surgery patient requests the following:    Percocet 10 - 325 mg

## 2018-06-04 NOTE — LETTER
6/6/2018 4:42 PM 
 
Mr. Kiley Deal [address] [facility] [phone] Re: Medication Refill Request 
 
 
Dear Denia Mckee: 
 
I hope this letter finds you well. I am a Licensed Practical Nurse with [address]. I have attempted to contact you by phone, but was unsuccessful. Your good health is important to us, that is why we are sending you this friendly letter. Please contact our office regarding medication refill per telephone encounter June 4, 2018. As always, our goal is to be your partner in life-long wellness. We appreciate the opportunity to serve you! Sincerely, Alex Shaikh LPN

## 2018-06-04 NOTE — TELEPHONE ENCOUNTER
Patient called in to let you know he finally had his surgery last Kim Seip and is now home from the hospital

## 2018-06-05 ENCOUNTER — APPOINTMENT (OUTPATIENT)
Dept: CT IMAGING | Age: 58
End: 2018-06-05
Attending: PHYSICIAN ASSISTANT
Payer: MEDICAID

## 2018-06-05 ENCOUNTER — APPOINTMENT (OUTPATIENT)
Dept: GENERAL RADIOLOGY | Age: 58
End: 2018-06-05
Attending: PHYSICIAN ASSISTANT
Payer: MEDICAID

## 2018-06-05 ENCOUNTER — HOSPITAL ENCOUNTER (EMERGENCY)
Age: 58
Discharge: HOME | End: 2018-06-05
Attending: EMERGENCY MEDICINE
Payer: MEDICAID

## 2018-06-05 VITALS
TEMPERATURE: 98.6 F | DIASTOLIC BLOOD PRESSURE: 74 MMHG | HEART RATE: 71 BPM | OXYGEN SATURATION: 100 % | SYSTOLIC BLOOD PRESSURE: 137 MMHG | RESPIRATION RATE: 16 BRPM

## 2018-06-05 DIAGNOSIS — M79.604 BILATERAL LEG PAIN: ICD-10-CM

## 2018-06-05 DIAGNOSIS — M79.605 BILATERAL LEG PAIN: ICD-10-CM

## 2018-06-05 DIAGNOSIS — R53.83 FATIGUE, UNSPECIFIED TYPE: ICD-10-CM

## 2018-06-05 DIAGNOSIS — R06.02 SOB (SHORTNESS OF BREATH): ICD-10-CM

## 2018-06-05 DIAGNOSIS — E87.6 HYPOKALEMIA: Primary | ICD-10-CM

## 2018-06-05 DIAGNOSIS — R42 DIZZINESS: ICD-10-CM

## 2018-06-05 LAB
ANION GAP SERPL CALC-SCNC: 8 MMOL/L (ref 3–18)
BASOPHILS # BLD: 0.2 K/UL (ref 0–0.06)
BASOPHILS NFR BLD: 2 % (ref 0–2)
BUN SERPL-MCNC: 19 MG/DL (ref 7–18)
BUN/CREAT SERPL: 17 (ref 12–20)
CALCIUM SERPL-MCNC: 9.4 MG/DL (ref 8.5–10.1)
CHLORIDE SERPL-SCNC: 99 MMOL/L (ref 100–108)
CK MB CFR SERPL CALC: NORMAL % (ref 0–4)
CK MB SERPL-MCNC: <1 NG/ML (ref 5–25)
CK SERPL-CCNC: 78 U/L (ref 39–308)
CO2 SERPL-SCNC: 32 MMOL/L (ref 21–32)
CREAT SERPL-MCNC: 1.09 MG/DL (ref 0.6–1.3)
DIFFERENTIAL METHOD BLD: ABNORMAL
EOSINOPHIL # BLD: 0.4 K/UL (ref 0–0.4)
EOSINOPHIL NFR BLD: 3 % (ref 0–5)
ERYTHROCYTE [DISTWIDTH] IN BLOOD BY AUTOMATED COUNT: 14 % (ref 11.6–14.5)
GLUCOSE SERPL-MCNC: 195 MG/DL (ref 74–99)
HCT VFR BLD AUTO: 34.2 % (ref 36–48)
HGB BLD-MCNC: 11.8 G/DL (ref 13–16)
LYMPHOCYTES # BLD: 1.3 K/UL (ref 0.9–3.6)
LYMPHOCYTES NFR BLD: 11 % (ref 21–52)
MCH RBC QN AUTO: 29.8 PG (ref 24–34)
MCHC RBC AUTO-ENTMCNC: 34.5 G/DL (ref 31–37)
MCV RBC AUTO: 86.4 FL (ref 74–97)
MONOCYTES # BLD: 0.8 K/UL (ref 0.05–1.2)
MONOCYTES NFR BLD: 6 % (ref 3–10)
NEUTS SEG # BLD: 9.5 K/UL (ref 1.8–8)
NEUTS SEG NFR BLD: 78 % (ref 40–73)
PLATELET # BLD AUTO: 371 K/UL (ref 135–420)
PMV BLD AUTO: 9.4 FL (ref 9.2–11.8)
POTASSIUM SERPL-SCNC: 2.9 MMOL/L (ref 3.5–5.5)
RBC # BLD AUTO: 3.96 M/UL (ref 4.7–5.5)
SODIUM SERPL-SCNC: 139 MMOL/L (ref 136–145)
TROPONIN I SERPL-MCNC: <0.02 NG/ML (ref 0–0.04)
WBC # BLD AUTO: 12.1 K/UL (ref 4.6–13.2)

## 2018-06-05 PROCEDURE — 74011250636 HC RX REV CODE- 250/636: Performed by: PHYSICIAN ASSISTANT

## 2018-06-05 PROCEDURE — 74011250637 HC RX REV CODE- 250/637: Performed by: PHYSICIAN ASSISTANT

## 2018-06-05 PROCEDURE — 80048 BASIC METABOLIC PNL TOTAL CA: CPT

## 2018-06-05 PROCEDURE — 99284 EMERGENCY DEPT VISIT MOD MDM: CPT

## 2018-06-05 PROCEDURE — 71046 X-RAY EXAM CHEST 2 VIEWS: CPT

## 2018-06-05 PROCEDURE — 93005 ELECTROCARDIOGRAM TRACING: CPT

## 2018-06-05 PROCEDURE — 70450 CT HEAD/BRAIN W/O DYE: CPT

## 2018-06-05 PROCEDURE — 96360 HYDRATION IV INFUSION INIT: CPT

## 2018-06-05 PROCEDURE — 82553 CREATINE MB FRACTION: CPT

## 2018-06-05 PROCEDURE — 85025 COMPLETE CBC W/AUTO DIFF WBC: CPT

## 2018-06-05 RX ORDER — HYDROCODONE BITARTRATE AND ACETAMINOPHEN 5; 325 MG/1; MG/1
1 TABLET ORAL
Status: COMPLETED | OUTPATIENT
Start: 2018-06-05 | End: 2018-06-05

## 2018-06-05 RX ORDER — POTASSIUM CHLORIDE 750 MG/1
20 TABLET, FILM COATED, EXTENDED RELEASE ORAL DAILY
Qty: 10 TAB | Refills: 0 | Status: SHIPPED | OUTPATIENT
Start: 2018-06-05 | End: 2018-06-10 | Stop reason: SDUPTHER

## 2018-06-05 RX ORDER — POTASSIUM CHLORIDE 20 MEQ/1
40 TABLET, EXTENDED RELEASE ORAL
Status: COMPLETED | OUTPATIENT
Start: 2018-06-05 | End: 2018-06-05

## 2018-06-05 RX ADMIN — POTASSIUM CHLORIDE 40 MEQ: 1500 TABLET, FILM COATED, EXTENDED RELEASE ORAL at 18:44

## 2018-06-05 RX ADMIN — SODIUM CHLORIDE 1000 ML: 900 INJECTION, SOLUTION INTRAVENOUS at 18:44

## 2018-06-05 RX ADMIN — HYDROCODONE BITARTRATE AND ACETAMINOPHEN 1 TABLET: 5; 325 TABLET ORAL at 19:16

## 2018-06-05 NOTE — DISCHARGE INSTRUCTIONS
Electrolyte Imbalance: Care Instructions  Your Care Instructions  Electrolytes are minerals in your blood. They include sodium, potassium, calcium, and magnesium. When they are not at the right levels, you can feel very ill. You may not know what is causing it, but you know something is wrong. You may feel weak or numb, have muscle spasms, or twitch. Your heart may beat fast. Symptoms are different with each mineral. Too much is as bad as too little. Minerals help keep your body working as it should. Vomiting, diarrhea, and fever can cause you to lose minerals. A problem with your kidneys can tip a mineral out of balance. So can taking certain medicines. Your doctor may do more tests. He or she may change your medicine and diet. If you are low in one or more minerals, they may be given through a tube into your vein (IV). Your doctor may have you take or drink special fluids or pills. If your kidneys are failing, your blood may be filtered. This is called dialysis. It can put the minerals back in balance. Follow-up care is a key part of your treatment and safety. Be sure to make and go to all appointments, and call your doctor if you are having problems. It's also a good idea to know your test results and keep a list of the medicines you take. How can you care for yourself at home? · Take your medicines exactly as prescribed. Call your doctor if you have any problems with your medicines. You will get more details on the specific medicines your doctor prescribes. · Do not take any medicine without talking to your doctor first. This includes prescription, over-the-counter, and herbal medicines. · If you have kidney, heart, or liver disease and have to limit fluids, talk with your doctor before you increase the amount of fluids you drink. · Your doctor or dietitian may give you a diet plan to help balance your minerals. Follow the diet carefully. When should you call for help?   Call 911 anytime you think you may need emergency care. For example, call if:  ? · You passed out (lost consciousness). ? · Your heartbeat seems to be irregular. It might be speeding up and then slowing down or skipping beats. ?Call your doctor now or seek immediate medical care if:  ? · You have muscle aches. ? · You feel very weak. ? · You are confused or cannot think clearly. ? Watch closely for changes in your health, and be sure to contact your doctor if:  ? · You do not get better as expected. Where can you learn more? Go to http://krystal-jenny.info/. Enter F141 in the search box to learn more about \"Electrolyte Imbalance: Care Instructions. \"  Current as of: May 12, 2017  Content Version: 11.4  © 8809-4661 [employer]. Care instructions adapted under license by [employer] (which disclaims liability or warranty for this information). If you have questions about a medical condition or this instruction, always ask your healthcare professional. [place] [address] any warranty or liability for your use of this information. Dizziness: Care Instructions  Your Care Instructions  Dizziness is the feeling of unsteadiness or fuzziness in your head. It is different than having vertigo, which is a feeling that the room is spinning or that you are moving or falling. It is also different from lightheadedness, which is the feeling that you are about to faint. It can be hard to know what causes dizziness. Some people feel dizzy when they have migraine headaches. Sometimes bouts of flu can make you feel dizzy. Some medical conditions, such as heart problems or high blood pressure, can make you feel dizzy. Many medicines can cause dizziness, including medicines for high blood pressure, pain, or anxiety. If a medicine causes your symptoms, your doctor may recommend that you stop or change the medicine.  If it is a problem with your heart, you may need medicine to help your heart work better. If there is no clear reason for your symptoms, your doctor may suggest watching and waiting for a while to see if the dizziness goes away on its own. Follow-up care is a key part of your treatment and safety. Be sure to make and go to all appointments, and call your doctor if you are having problems. It's also a good idea to know your test results and keep a list of the medicines you take. How can you care for yourself at home? · If your doctor recommends or prescribes medicine, take it exactly as directed. Call your doctor if you think you are having a problem with your medicine. · Do not drive while you feel dizzy. · Try to prevent falls. Steps you can take include:  ¨ Using nonskid mats, adding grab bars near the tub, and using night-lights. ¨ Clearing your home so that walkways are free of anything you might trip on. ¨ Letting family and friends know that you have been feeling dizzy. This will help them know how to help you. When should you call for help? Call 911 anytime you think you may need emergency care. For example, call if:  ? · You passed out (lost consciousness). ? · You have dizziness along with symptoms of a heart attack. These may include:  ¨ Chest pain or pressure, or a strange feeling in the chest.  ¨ Sweating. ¨ Shortness of breath. ¨ Nausea or vomiting. ¨ Pain, pressure, or a strange feeling in the back, neck, jaw, or upper belly or in one or both shoulders or arms. ¨ Lightheadedness or sudden weakness. ¨ A fast or irregular heartbeat. ? · You have symptoms of a stroke. These may include:  ¨ Sudden numbness, tingling, weakness, or loss of movement in your face, arm, or leg, especially on only one side of your body. ¨ Sudden vision changes. ¨ Sudden trouble speaking. ¨ Sudden confusion or trouble understanding simple statements. ¨ Sudden problems with walking or balance. ¨ A sudden, severe headache that is different from past headaches.    ?Call your doctor now or seek immediate medical care if:  ? · You feel dizzy and have a fever, headache, or ringing in your ears. ? · You have new or increased nausea and vomiting. ? · Your dizziness does not go away or comes back. ? Watch closely for changes in your health, and be sure to contact your doctor if:  ? · You do not get better as expected. Where can you learn more? Go to http://krystal-jenny.info/. Enter W247 in the search box to learn more about \"Dizziness: Care Instructions. \"  Current as of: March 20, 2017  Content Version: 11.4  © 3282-6790 [employer]. Care instructions adapted under license by [employer] (which disclaims liability or warranty for this information). If you have questions about a medical condition or this instruction, always ask your healthcare professional. [address] any warranty or liability for your use of this information.

## 2018-06-05 NOTE — ED NOTES
Patient states he has been to multiple ERs so he can be admitted \"somewhere\"   Patient is extremely upset because he feels no one is helping him get admitted to the hospital or any facility.

## 2018-06-05 NOTE — TELEPHONE ENCOUNTER
Early refill cannot be given. He was dispensed Percocet #90 on 5/18, morphine #28 on 6/1(Dr. Cristy Nobles) and Percocet #20 on 6/1(Dr. Cristy Nobles). He should see his surgeon if his postoperative pain is not controlled. Please remind him that in order to receive controlled substance from our office, he needs to notify us within 24 hours if he is prescribed pain medication from another provider.

## 2018-06-05 NOTE — ED NOTES
I have reviewed discharge instructions with the patient. The patient verbalized understanding. Patient armband removed and shredded. 1 prescription given. All questions answered.   Pt d/c'd awake, alert and in NAD

## 2018-06-05 NOTE — TELEPHONE ENCOUNTER
Attempted to contact pt at numbers listed, Voice mail not set up. Will continue to try to contact pt

## 2018-06-05 NOTE — ED TRIAGE NOTES
Patient complains of bilateral leg pain. States he had a stent placed about one to two weeks ago at [facility] on [address].   Also states he ran a fever a while ago. Very vague.

## 2018-06-05 NOTE — ED PROVIDER NOTES
EMERGENCY DEPARTMENT HISTORY AND PHYSICAL EXAM    Date: 6/5/2018  Patient Name: Roney Stover    History of Presenting Illness     Chief Complaint   Patient presents with    Dizziness    Shortness of Breath    Leg Pain         History Provided By: Patient    Chief Complaint: Multiple complaints  Duration: 4 Days  Timing:  Gradual  Location: all over  Quality: Aching, Burning, Cramping, Sharp, Pressure and Tightness  Severity: 10 out of 10  Modifying Factors: walking makes leg pain worse  Associated Symptoms: bilateral lower leg pain, shortness of breath, dizziness, fatigue, hungry      HPI: Roney Stover is a 63 y.o. male with a PMH of BL PAD, Nephrolithiasis, HTN, Depression, Sleep Disorder who presents to the ER via EMS with multiple complaints. Patient states he has been having some dizziness for 1 day, bilateral lower leg pain since being discharged after surgery and stenting last week, shortness of breath and weakness. Patient reports he is waiting to hear from the hospital regarding placement in a nursing home; he has not eaten or slept in several days. He rates his pain 10/10, and states it is worse when he walks. He denied any recent fevers, chills, chest pain, abd pain, N/V, dysuria, numbness, tingling, abnormal weakness and has no other symptoms or complaints. PCP: Bebe Madison MD    Current Facility-Administered Medications   Medication Dose Route Frequency Provider Last Rate Last Dose    sodium chloride 0.9 % bolus infusion 1,000 mL  1,000 mL IntraVENous ONCE Jane Canchola PA-C 1,000 mL/hr at 06/05/18 1844 1,000 mL at 06/05/18 1844     Current Outpatient Prescriptions   Medication Sig Dispense Refill    potassium chloride SR (KLOR-CON 10) 10 mEq tablet Take 2 Tabs by mouth daily for 5 days. 10 Tab 0    therapeutic multivitamin-minerals (THERAGRAN-M) tablet Take 1 Tab by Mouth Once a Day.       oxyCODONE-acetaminophen (PERCOCET) 5-325 mg per tablet Take 1 Tab by mouth every eight (8) hours as needed for Pain. Max Daily Amount: 3 Tabs. 90 Tab 0    amLODIPine (NORVASC) 10 mg tablet Take 1 Tab by mouth daily. Indications: hypertension 90 Tab 3    venlafaxine-SR (EFFEXOR-XR) 150 mg capsule Take 1 Cap by mouth daily. Indications: ANXIETY WITH DEPRESSION 90 Cap 3    melatonin 3 mg tablet Take  by mouth.  polyethylene glycol (MIRALAX) 17 gram packet Take 17 g by mouth daily.  (No Medication Selected) 1 Tab two (2) times a day. Morphine sulfate  15 mg         Past History     Past Medical History:  Past Medical History:   Diagnosis Date    Calculus of kidney     Depression     Hypertension     Sleep disorder     Vascular disorder of lower extremity     bilateral       Past Surgical History:  Past Surgical History:   Procedure Laterality Date    HX AMPUTATION TOE Left     x 5 toes    HX HEENT         Family History:  Family History   Problem Relation Age of Onset    Arthritis-osteo Mother     Diabetes Father     Heart Disease Father     Melanoma Father     Psychiatric Disorder Brother        Social History:  Social History   Substance Use Topics    Smoking status: Current Every Day Smoker    Smokeless tobacco: Never Used    Alcohol use No       Allergies:  No Known Allergies      Review of Systems   Review of Systems   Constitutional: Positive for fatigue. Negative for chills and fever. HENT: Negative. Negative for sore throat. Eyes: Negative. Respiratory: Positive for shortness of breath. Negative for cough. Cardiovascular: Negative for chest pain and palpitations. Gastrointestinal: Negative for abdominal pain, nausea and vomiting. Genitourinary: Negative for dysuria. Musculoskeletal: Positive for myalgias. Skin: Negative. Neurological: Positive for dizziness. Negative for weakness, light-headedness and headaches. Psychiatric/Behavioral: Negative. All other systems reviewed and are negative.       Physical Exam     Vitals:    06/05/18 1820   BP: 137/74   Pulse: 71   Resp: 16   Temp: 98.6 °F (37 °C)   SpO2: 100%     Physical Exam   Constitutional: He is oriented to person, place, and time. He appears well-developed and well-nourished. No distress. Ambulatory without difficulty or use of his walker     HENT:   Head: Normocephalic and atraumatic. Mouth/Throat: Oropharynx is clear and moist.   Eyes: Conjunctivae are normal. No scleral icterus. Neck: Neck supple. No JVD present. No tracheal deviation present. Cardiovascular: Normal rate, regular rhythm and normal heart sounds. Pulmonary/Chest: Effort normal and breath sounds normal. No accessory muscle usage. No respiratory distress. He has no wheezes. He has no rales. He exhibits no tenderness. Abdominal: Soft. Bowel sounds are normal. He exhibits no distension. There is no tenderness. There is no rebound and no guarding. Musculoskeletal: Normal range of motion. Neurological: He is alert and oriented to person, place, and time. He has normal strength. Gait normal. GCS eye subscore is 4. GCS verbal subscore is 5. GCS motor subscore is 6. Skin: Skin is warm and dry. He is not diaphoretic. Psychiatric: He has a normal mood and affect. Nursing note and vitals reviewed.         Diagnostic Study Results     Labs -     Recent Results (from the past 12 hour(s))   EKG, 12 LEAD, INITIAL    Collection Time: 06/05/18  5:30 PM   Result Value Ref Range    Ventricular Rate 92 BPM    Atrial Rate 92 BPM    P-R Interval 178 ms    QRS Duration 90 ms    Q-T Interval 366 ms    QTC Calculation (Bezet) 452 ms    Calculated P Axis 45 degrees    Calculated R Axis 14 degrees    Calculated T Axis 48 degrees    Diagnosis       Normal sinus rhythm  Moderate voltage criteria for LVH, may be normal variant  Cannot rule out Septal infarct , age undetermined  Abnormal ECG  No previous ECGs available     CBC WITH AUTOMATED DIFF    Collection Time: 06/05/18  5:41 PM   Result Value Ref Range    WBC 12.1 4.6 - 13.2 K/uL RBC 3.96 (L) 4.70 - 5.50 M/uL    HGB 11.8 (L) 13.0 - 16.0 g/dL    HCT 34.2 (L) 36.0 - 48.0 %    MCV 86.4 74.0 - 97.0 FL    MCH 29.8 24.0 - 34.0 PG    MCHC 34.5 31.0 - 37.0 g/dL    RDW 14.0 11.6 - 14.5 %    PLATELET 571 520 - 659 K/uL    MPV 9.4 9.2 - 11.8 FL    NEUTROPHILS 78 (H) 40 - 73 %    LYMPHOCYTES 11 (L) 21 - 52 %    MONOCYTES 6 3 - 10 %    EOSINOPHILS 3 0 - 5 %    BASOPHILS 2 0 - 2 %    ABS. NEUTROPHILS 9.5 (H) 1.8 - 8.0 K/UL    ABS. LYMPHOCYTES 1.3 0.9 - 3.6 K/UL    ABS. MONOCYTES 0.8 0.05 - 1.2 K/UL    ABS. EOSINOPHILS 0.4 0.0 - 0.4 K/UL    ABS. BASOPHILS 0.2 (H) 0.0 - 0.06 K/UL    DF AUTOMATED     METABOLIC PANEL, BASIC    Collection Time: 06/05/18  5:41 PM   Result Value Ref Range    Sodium 139 136 - 145 mmol/L    Potassium 2.9 (LL) 3.5 - 5.5 mmol/L    Chloride 99 (L) 100 - 108 mmol/L    CO2 32 21 - 32 mmol/L    Anion gap 8 3.0 - 18 mmol/L    Glucose 195 (H) 74 - 99 mg/dL    BUN 19 (H) 7.0 - 18 MG/DL    Creatinine 1.09 0.6 - 1.3 MG/DL    BUN/Creatinine ratio 17 12 - 20      GFR est AA >60 >60 ml/min/1.73m2    GFR est non-AA >60 >60 ml/min/1.73m2    Calcium 9.4 8.5 - 10.1 MG/DL   CARDIAC PANEL,(CK, CKMB & TROPONIN)    Collection Time: 06/05/18  5:41 PM   Result Value Ref Range    CK 78 39 - 308 U/L    CK - MB <1.0 <3.6 ng/ml    CK-MB Index  0.0 - 4.0 %     CALCULATION NOT PERFORMED WHEN RESULT IS BELOW LINEAR LIMIT    Troponin-I, Qt. <0.02 0.0 - 0.045 NG/ML       Radiologic Studies -   XR CHEST PA LAT    (Results Pending)   CT HEAD WO CONT    (Results Pending)     CT Results  (Last 48 hours)    None        CXR Results  (Last 48 hours)    None            Medical Decision Making   I am the first provider for this patient. I reviewed the vital signs, available nursing notes, past medical history, past surgical history, family history and social history. Vital Signs-Reviewed the patient's vital signs.     Records Reviewed: Nursing Notes, Old Medical Records, Previous Radiology Studies and Previous Laboratory Studies    ED Course:   5:48 PM  62 y/o male brought in by EMS with multiple complaints. Stating he is dizzy, SOB, hungry, having BL lower leg pain and swelling. This has been ongoing x several weeks. Recently saw vascular and had a stent placed; placed on anticoagulation meds. Taking as prescribed. EKG NSR rate 92 w/ no acute ST/T wave abnormalities. Will plan on labs, ct head and will reeval.  Gregg Portillo PA-C    7:08 PM  Pt noted to have K of 2.9. Will plan on PO replacement; all other labs reviewed. Awaiting CT head at this time. Gregg Portillo PA-C     7:17 PM  Ct head with no acute findings. All other labs reviewed. Pt stable for discharge and outpatient follow up at this time. Discussed all results with pt. States he is having pain. Ordered 1 Norco.  Will not prescribe as pt was just given Narc prescription from [facility] last night. Given follow up with Gisella Aguirre NP with vascular from previous recent visit. All questions answered and patient in agreement with plan of care. Will plan for discharge. Gregg Portillo PA-C      Disposition:  Discharged    DISCHARGE NOTE:       Care plan outlined and precautions discussed. Patient has no new complaints, changes, or physical findings. Results of labs, imaging, ekg were reviewed with the patient. All medications were reviewed with the patient; will d/c home with Klor-con. All of pt's questions and concerns were addressed. Patient was instructed and agrees to follow up with Gisella Aguirre NP vascular, as well as to return to the ED upon further deterioration. Patient is ready to go home.     Follow-up Information     Follow up With Details Comments Contact [facility] EMERGENCY DEPT  If symptoms worsen [address] [place] [address]    Salas Fraga NP Call in 1 day ER follow for bilateral leg pain [facility] [address] [phone]            Current Discharge Medication List START taking these medications    Details   potassium chloride SR (KLOR-CON 10) 10 mEq tablet Take 2 Tabs by mouth daily for 5 days. Qty: 10 Tab, Refills: 0         CONTINUE these medications which have NOT CHANGED    Details   therapeutic multivitamin-minerals (THERAGRAN-M) tablet Take 1 Tab by Mouth Once a Day. oxyCODONE-acetaminophen (PERCOCET) 5-325 mg per tablet Take 1 Tab by mouth every eight (8) hours as needed for Pain. Max Daily Amount: 3 Tabs. Qty: 90 Tab, Refills: 0    Associated Diagnoses: PAD (peripheral artery disease) ([facility])      amLODIPine (NORVASC) 10 mg tablet Take 1 Tab by mouth daily. Indications: hypertension  Qty: 90 Tab, Refills: 3      venlafaxine-SR (EFFEXOR-XR) 150 mg capsule Take 1 Cap by mouth daily. Indications: ANXIETY WITH DEPRESSION  Qty: 90 Cap, Refills: 3      melatonin 3 mg tablet Take  by mouth.      polyethylene glycol (MIRALAX) 17 gram packet Take 17 g by mouth daily. (No Medication Selected) 1 Tab two (2) times a day. Morphine sulfate  15 mg             Provider Notes (Medical Decision Making):     Procedures:  Procedures        Diagnosis     Clinical Impression:   1. Hypokalemia    2. SOB (shortness of breath)    3. Dizziness    4. Fatigue, unspecified type    5.  Bilateral leg pain

## 2018-06-06 LAB
ATRIAL RATE: 92 BPM
CALCULATED P AXIS, ECG09: 45 DEGREES
CALCULATED R AXIS, ECG10: 14 DEGREES
CALCULATED T AXIS, ECG11: 48 DEGREES
DIAGNOSIS, 93000: NORMAL
P-R INTERVAL, ECG05: 178 MS
Q-T INTERVAL, ECG07: 366 MS
QRS DURATION, ECG06: 90 MS
QTC CALCULATION (BEZET), ECG08: 452 MS
VENTRICULAR RATE, ECG03: 92 BPM

## 2018-06-08 ENCOUNTER — HOSPITAL ENCOUNTER (OUTPATIENT)
Dept: LAB | Age: 58
Discharge: HOME | End: 2018-06-08
Payer: MEDICAID

## 2018-06-08 ENCOUNTER — OFFICE VISIT (OUTPATIENT)
Dept: INTERNAL MEDICINE CLINIC | Age: 58
End: 2018-06-08

## 2018-06-08 VITALS
HEIGHT: 74 IN | TEMPERATURE: 96.7 F | SYSTOLIC BLOOD PRESSURE: 163 MMHG | RESPIRATION RATE: 18 BRPM | OXYGEN SATURATION: 93 % | WEIGHT: 146.2 LBS | DIASTOLIC BLOOD PRESSURE: 92 MMHG | BODY MASS INDEX: 18.76 KG/M2 | HEART RATE: 70 BPM

## 2018-06-08 DIAGNOSIS — Z59.00 HOMELESSNESS: ICD-10-CM

## 2018-06-08 DIAGNOSIS — L03.116 CELLULITIS OF LEFT LOWER EXTREMITY: ICD-10-CM

## 2018-06-08 DIAGNOSIS — E87.6 HYPOKALEMIA: ICD-10-CM

## 2018-06-08 DIAGNOSIS — G89.29 OTHER CHRONIC PAIN: Primary | ICD-10-CM

## 2018-06-08 DIAGNOSIS — I73.9 PAD (PERIPHERAL ARTERY DISEASE) (HCC): ICD-10-CM

## 2018-06-08 DIAGNOSIS — I10 ESSENTIAL HYPERTENSION: ICD-10-CM

## 2018-06-08 LAB
ANION GAP SERPL CALC-SCNC: 10 MMOL/L (ref 3–18)
BASOPHILS # BLD: 0.2 K/UL (ref 0–0.06)
BASOPHILS NFR BLD: 1 % (ref 0–2)
BUN SERPL-MCNC: 15 MG/DL (ref 7–18)
BUN/CREAT SERPL: 15 (ref 12–20)
CALCIUM SERPL-MCNC: 9.6 MG/DL (ref 8.5–10.1)
CHLORIDE SERPL-SCNC: 103 MMOL/L (ref 100–108)
CO2 SERPL-SCNC: 28 MMOL/L (ref 21–32)
CREAT SERPL-MCNC: 0.98 MG/DL (ref 0.6–1.3)
DIFFERENTIAL METHOD BLD: ABNORMAL
EOSINOPHIL # BLD: 0.2 K/UL (ref 0–0.4)
EOSINOPHIL NFR BLD: 2 % (ref 0–5)
ERYTHROCYTE [DISTWIDTH] IN BLOOD BY AUTOMATED COUNT: 14.5 % (ref 11.6–14.5)
GLUCOSE SERPL-MCNC: 99 MG/DL (ref 74–99)
HCT VFR BLD AUTO: 35 % (ref 36–48)
HGB BLD-MCNC: 11.4 G/DL (ref 13–16)
LYMPHOCYTES # BLD: 1.5 K/UL (ref 0.9–3.6)
LYMPHOCYTES NFR BLD: 13 % (ref 21–52)
MCH RBC QN AUTO: 29 PG (ref 24–34)
MCHC RBC AUTO-ENTMCNC: 32.6 G/DL (ref 31–37)
MCV RBC AUTO: 89.1 FL (ref 74–97)
MONOCYTES # BLD: 0.6 K/UL (ref 0.05–1.2)
MONOCYTES NFR BLD: 5 % (ref 3–10)
NEUTS SEG # BLD: 9.2 K/UL (ref 1.8–8)
NEUTS SEG NFR BLD: 79 % (ref 40–73)
PLATELET # BLD AUTO: 437 K/UL (ref 135–420)
PMV BLD AUTO: 10.2 FL (ref 9.2–11.8)
POTASSIUM SERPL-SCNC: 3.1 MMOL/L (ref 3.5–5.5)
RBC # BLD AUTO: 3.93 M/UL (ref 4.7–5.5)
SODIUM SERPL-SCNC: 141 MMOL/L (ref 136–145)
WBC # BLD AUTO: 11.6 K/UL (ref 4.6–13.2)

## 2018-06-08 PROCEDURE — 85025 COMPLETE CBC W/AUTO DIFF WBC: CPT | Performed by: INTERNAL MEDICINE

## 2018-06-08 PROCEDURE — 80048 BASIC METABOLIC PNL TOTAL CA: CPT | Performed by: INTERNAL MEDICINE

## 2018-06-08 RX ORDER — OXYCODONE AND ACETAMINOPHEN 5; 325 MG/1; MG/1
1 TABLET ORAL
Qty: 90 TAB | Refills: 0 | Status: CANCELLED | OUTPATIENT
Start: 2018-06-08

## 2018-06-08 RX ORDER — CLOPIDOGREL BISULFATE 75 MG/1
150 TABLET ORAL DAILY
Status: ON HOLD | COMMUNITY
Start: 2018-06-01 | End: 2019-01-11 | Stop reason: SDUPTHER

## 2018-06-08 RX ORDER — MORPHINE SULFATE 15 MG/1
15 TABLET, FILM COATED, EXTENDED RELEASE ORAL
Status: ON HOLD | COMMUNITY
Start: 2018-05-31 | End: 2019-01-02

## 2018-06-08 RX ORDER — GABAPENTIN 100 MG/1
100 CAPSULE ORAL 3 TIMES DAILY
Qty: 90 CAP | Refills: 2 | Status: SHIPPED | OUTPATIENT
Start: 2018-06-08

## 2018-06-08 RX ORDER — ASPIRIN 325 MG
325 TABLET ORAL
COMMUNITY
Start: 2018-06-01

## 2018-06-08 RX ORDER — MORPHINE SULFATE 15 MG/1
15 TABLET, FILM COATED, EXTENDED RELEASE ORAL
Status: CANCELLED | OUTPATIENT
Start: 2018-06-08

## 2018-06-08 RX ORDER — IBUPROFEN 200 MG
1 TABLET ORAL
COMMUNITY
Start: 2018-06-01 | End: 2019-02-15

## 2018-06-08 RX ORDER — VENLAFAXINE HYDROCHLORIDE 150 MG/1
150 CAPSULE, EXTENDED RELEASE ORAL DAILY
Qty: 90 CAP | Refills: 3 | Status: SHIPPED | OUTPATIENT
Start: 2018-06-08 | End: 2019-06-19

## 2018-06-08 RX ORDER — GABAPENTIN 100 MG/1
100 CAPSULE ORAL
COMMUNITY
Start: 2018-05-31 | End: 2018-06-08 | Stop reason: SDUPTHER

## 2018-06-08 RX ORDER — ATORVASTATIN CALCIUM 40 MG/1
40 TABLET, FILM COATED ORAL
COMMUNITY
Start: 2018-06-01

## 2018-06-08 RX ORDER — SULFAMETHOXAZOLE AND TRIMETHOPRIM 800; 160 MG/1; MG/1
1 TABLET ORAL 2 TIMES DAILY
Qty: 20 TAB | Refills: 0 | Status: SHIPPED | OUTPATIENT
Start: 2018-06-08 | End: 2018-06-18

## 2018-06-08 RX ORDER — OXYCODONE AND ACETAMINOPHEN 10; 325 MG/1; MG/1
1 TABLET ORAL
Qty: 90 TAB | Refills: 0 | Status: ON HOLD | OUTPATIENT
Start: 2018-06-08 | End: 2019-01-02

## 2018-06-08 RX ORDER — NALOXONE HYDROCHLORIDE 4 MG/.1ML
4 SPRAY NASAL
Status: ON HOLD | COMMUNITY
Start: 2018-05-31 | End: 2019-01-02

## 2018-06-08 SDOH — ECONOMIC STABILITY - HOUSING INSECURITY: HOMELESSNESS UNSPECIFIED: Z59.00

## 2018-06-08 NOTE — PROGRESS NOTES
ROOM # [id number] pt with two pt identifiers(name and ). Reviewed record in preparation for visit and have obtained necessary documentation. Chief Complaint   Patient presents with   [facility] Follow Up     lt toes amputation,med refills      Estuardo Keane preferred language for health care discussion is english/other. Is the patient using any DME equipment during OV? Norma Razo is due for:  Health Maintenance Due   Topic    Hepatitis C Screening     Pneumococcal 19-64 Medium Risk (1 of 1 - PPSV23)    DTaP/Tdap/Td series (1 - Tdap)    FOBT Q 1 YEAR AGE 50-75      Health Maintenance reviewed and discussed per provider  Please order/place referral if appropriate. Advance Directive:  1. Do you have an advance directive in place? Patient Reply: NO    2. If not, would you like material regarding how to put one in place? NO    Coordination of Care:  1. Have you been to the ER, urgent care clinic since your last visit? Hospitalized since your last visit? YES    2. Have you seen or consulted any other health care providers outside of the [address] [place] [address] since your last visit? Include any pap smears or colon screening. YES    Patient is accompanied by self I have received verbal consent from Estuardo Keane to discuss any/all medical information while they are present in the room.     Learning Assessment:  Learning Assessment 2018   PRIMARY LEARNER Patient   HIGHEST LEVEL OF EDUCATION - PRIMARY LEARNER  GRADUATED HIGH SCHOOL OR GED   BARRIERS PRIMARY LEARNER NONE   CO-LEARNER CAREGIVER No   PRIMARY LANGUAGE ENGLISH   LEARNER PREFERENCE PRIMARY DEMONSTRATION   ANSWERED BY patient   RELATIONSHIP SELF     Depression Screening:  PHQ over the last two weeks 2018   Little interest or pleasure in doing things Not at all Not at all Not at all   Feeling down, depressed or hopeless Not at all Not at all Not at all   Total Score PHQ 2 0 0 0     Abuse Screening:  No flowsheet data found. Fall Risk  No flowsheet data found.

## 2018-06-08 NOTE — LETTER
Name:Liyah Marr NG:[birth year] MR #:[id number] Provider Fatou Giron MD  
*[id number]* BSMG-491 (5/16) Page 1 of 5 Initial [facility] CONTROLLED SUBSTANCE AGREEMENT I may be prescribed medications that are controlled substances as part  of my treatment plan for management of my medical condition(s). The goal of my treatment plan is to maintain and/or improve my health and wellbeing. Because controlled substances have an increased risk of abuse or harm, continual re-evaluation is needed determine if the goals of my treatment plan are being met for my safety and the safety of others. Germania Reynolds  am entering into this Controlled Substance Agreement with my provider, Slade Joseph MD at [facility] . I understand that successful treatment requires mutual trust and honesty between me and my provider. I understand that there are state and federal laws and regulations which apply to the medications that my provider may prescribe that must be followed. I understand there are risks and benefits ts of taking the medicines that my provider may prescribe. I understand and agree that following this Agreement is necessary in continuing my provider-patient relationship and success of my treatment plan. As a part of my treatment plan, I agree to the following: COMMUNICATION: 
 
1. I will communicate fully with my provider about my medical condition(s), including the effect on my daily life and how well my medications are helping. I will tell my provider all of the medications that I take for any reason, including medications I receive from another health care provider, and will notify my provider about all issues, problems or concerns, including any side effects, which may be related to my medications. I understand that this information allows my provider to adjust my treatment plan to help manage my medical condition.  I understand that this information will become part of my permanent medical record. 2. I will notify my provider if I have a history of alcohol/drug misuse/addiction or if I have had treatment for alcohol/drug addiction in the past, or if I have a new problem with or concern about alcohol/drug use/addiction, because this increases the likelihood of high risk behaviors and may lead to serious medical conditions. 3. Females Only: I will notify my provider if I am or become pregnant, or if I intend to become pregnant, or if I intend to breastfeed. I understand that communication of these issues with my provider is important, due to possible effects my medication could have on an unborn fetus or breastfeeding child. Name:.Willam Schneider WMM:[birth year] MR #:[id number] Provider Alma Rosa Galdamez MD  
*FYCW-403* BSMG-491 (5/16) Page 2 of 5 Initial [facility] MISUSE OF MEDICATIONS / DRUGS: 
 
1. I agree to take all controlled substances as prescribed, and will not misuse or abuse any controlled substances prescribed by my provider. For my safety, I will not increase the amount of medicine I take without first talking with and getting permission from my provider. 2. If I have a medical emergency, another health care provider may prescribe me medication. If I seek emergency treatment, I will notify my provider within seventy-two (72) hours. 3. I understand that my provider may discuss my use and/or possible misuse/abuse of controlled substances and alcohol, as appropriate, with any health care provider involved in my care, pharmacist or legal authority. ILLEGAL DRUGS: 
 
1. I will not use illegal drugs of any kind, including but not limited to marijuana, heroin, cocaine, or any prescription drug which is not prescribed to me. DRUG DIVERSION / PRESCRIPTION FRAUD: 
 
1. I will not share, sell, trade, give away, or otherwise misuse my prescriptions or medications. 2. I will not alter any prescriptions provided to me by my provider. SINGLE PROVIDER: 
 
1. I agree that all controlled substances that I take will be prescribed only by my provider (or his/her covering provider) under this Agreement. This agreement does not prevent me from seeking emergency medical treatment or receiving pain management related to a surgery. PROTECTING MEDICATIONS: 
 
1. I am responsible for keeping my prescriptions and medications in a safe and secure place including safeguarding them from loss or theft. I understand that lost, stolen or damaged/destroyed prescriptions or medications will not be replaced. Name:.Willam Landa Q:[birth year] MR #:[id number] Provider Florencio Herrera MD  
*VFOH-710* BSMG-491 (5/16) Page 3 of 5 Initial [email or web address] PRESCRIPTION RENEWALS/REFILLS: 
 
1. I will follow my controlled substance medication schedule as prescribed by my provider. 2. I understand and agree that I will make any requests for renewals or refills of my prescriptions only at the time of an office visit or during my providers regular office hours subject to the prescription refill requirements of the individual practice. 3. I understand that my provider may not call in prescriptions for controlled substances to my pharmacy. 4. I understand that my provider may adjust or discontinue these medications as deemed appropriate for my medical treatment plan. This Agreement does not guarantee the prescription of controlled medications. 5. I agree that if my medications are adjusted or discontinued, I will properly dispose of any remaining medications. I understand that I will be required to dispose of any remaining controlled medications prior to being provided with any prescriptions for other controlled medications.  
 
 
1. I authorize my provider and my pharmacy to cooperate fully with any local, state, or federal law enforcement agency in the investigation of any possible misuse, sale, or other diversion of my controlled substance prescriptions or medications. RISKS: 
 
 
1. I understand that if I do not adhere to this Agreement in any way, my provider may change my prescriptions, stop prescribing controlled substances or end our provider-patient relationship. 2. If my provider decides to stop prescribing medication, or decides to end our provider-patient relationship,my provider may require that I taper my medications slowly. If necessary, my provider may also provide a prescription for other medications to treat my withdrawal symptoms. UNDERSTANDING THIS AGREEMENT: 
 
I understand that my provider may adjust or stop my prescriptions for controlled substances based on my medical condition and my treatment plan. I understand that this Agreement does not guarantee that I will be prescribed medications or controlled substances. I understand that controlled substances may be just one part 
of my treatment plan.  
 
My initial on each page and my signature below shows that I have read each page of this Agreement, I have had an opportunity to ask questions, and all of my questions have been answered to my satisfaction by my provider. By signing below, I agree to comply with this Agreement, and I understand that if I do not follow the Agreements listed above, my provider may stop 
 
 
 
_________________________________________  Date/Time 6/8/2018 1:33 PM   
             (Patient Signature)

## 2018-06-08 NOTE — PATIENT INSTRUCTIONS
Cellulitis: Care Instructions  Your Care Instructions    Cellulitis is a skin infection. It often occurs after a break in the skin from a scrape, cut, bite, or puncture, or after a rash. The doctor has checked you carefully, but problems can develop later. If you notice any problems or new symptoms, get medical treatment right away. Follow-up care is a key part of your treatment and safety. Be sure to make and go to all appointments, and call your doctor if you are having problems. It's also a good idea to know your test results and keep a list of the medicines you take. How can you care for yourself at home? · Take your antibiotics as directed. Do not stop taking them just because you feel better. You need to take the full course of antibiotics. · Prop up the infected area on pillows to reduce pain and swelling. Try to keep the area above the level of your heart as often as you can. · If your doctor told you how to care for your wound, follow your doctor's instructions. If you did not get instructions, follow this general advice:  ¨ Wash the wound with clean water 2 times a day. Don't use hydrogen peroxide or alcohol, which can slow healing. ¨ You may cover the wound with a thin layer of petroleum jelly, such as Vaseline, and a nonstick bandage. ¨ Apply more petroleum jelly and replace the bandage as needed. · Be safe with medicines. Take pain medicines exactly as directed. ¨ If the doctor gave you a prescription medicine for pain, take it as prescribed. ¨ If you are not taking a prescription pain medicine, ask your doctor if you can take an over-the-counter medicine. To prevent cellulitis in the future  · Try to prevent cuts, scrapes, or other injuries to your skin. Cellulitis most often occurs where there is a break in the skin. · If you get a scrape, cut, mild burn, or bite, wash the wound with clean water as soon as you can to help avoid infection.  Don't use hydrogen peroxide or alcohol, which can slow healing. · If you have swelling in your legs (edema), support stockings and good skin care may help prevent leg sores and cellulitis. · Take care of your feet, especially if you have diabetes or other conditions that increase the risk of infection. Wear shoes and socks. Do not go barefoot. If you have athlete's foot or other skin problems on your feet, talk to your doctor about how to treat them. When should you call for help? Call your doctor now or seek immediate medical care if:  ? · You have signs that your infection is getting worse, such as:  ¨ Increased pain, swelling, warmth, or redness. ¨ Red streaks leading from the area. ¨ Pus draining from the area. ¨ A fever. ? · You get a rash. ? Watch closely for changes in your health, and be sure to contact your doctor if:  ? · You are not getting better after 1 day (24 hours). ? · You do not get better as expected. Where can you learn more? Go to http://krystal-jenny.info/. Guera Li in the search box to learn more about \"Cellulitis: Care Instructions. \"  Current as of: October 13, 2016  Content Version: 11.4  © 6386-0347 [email or web address]. Care instructions adapted under license by [employer] (which disclaims liability or warranty for this information). If you have questions about a medical condition or this instruction, always ask your healthcare professional. [place] [address] any warranty or liability for your use of this information.

## 2018-06-08 NOTE — PROGRESS NOTES
HISTORY OF PRESENT ILLNESS  Dave Pringle is a 63 y.o. male. HPI Comments: 62 yo male here for f/u from recent hospital stay due to LLE cellulitis, PAD, pain. He has difficulty explain time course, but he reports recent stenting RLE; cellulitis LLE which had recent amputation of toes. ? Evaluation for osteo. Seems to have been admitted initially 5/24 then transferred to [facility] where he was discharged 6/1/18. He reports at some point during his hospital stay he was instructed to double up on his Percocet and was taking his outpatient prescription rather than supplied by hospital (??) Discussed that this seems unusual to me and I see no record of dose change. He was seen in ED at [facility] 6/4/18: \"65 y.o. Male just discharged several days ago s/p TMA of left foot and revascularization of the right leg. Here now due to lack of place to go and just go kicked out of place he was at. c/o pain and only given percocet 5mg instead of 10mg and now he has run out due to doubling up. He was seen by podiatry last week as well. \". New Prescriptions:OXYCODONE-ACETAMINOPHEN (PERCOCET)  MG PO TABS Take 1 Tab by Mouth Every 4 Hours As Needed , #10 dispensed  He then presented to the ED at [facility] 6/5/18 after early refill here declined. Found to have low K+ on labs. Reports pain is severe at this time in both legs, R due to PAD, L due to cellulitis. BP is a little elevated today. Reports he is taking medication  He reports he is still waiting to hear regarding shelters but that he has someone to stay with starting tonight temporarily. Review of Systems   Constitutional: Negative for chills, fever and weight loss. HENT: Negative for congestion and ear pain. Eyes: Negative for blurred vision and pain. Respiratory: Negative for cough and shortness of breath. Cardiovascular: Positive for leg swelling. Negative for chest pain and palpitations. Gastrointestinal: Negative for nausea and vomiting.    Genitourinary: Negative for frequency and urgency. Musculoskeletal: Positive for joint pain and myalgias. Skin: Positive for rash. Negative for itching. Neurological: Negative for dizziness, tingling and headaches. Psychiatric/Behavioral: Negative for depression. The patient is not nervous/anxious. Physical Exam   Constitutional: He appears well-developed and well-nourished. No distress. BP (!) 163/92 (BP 1 Location: Left arm, BP Patient Position: Sitting)  Pulse 70  Temp 96.7 °F (35.9 °C) (Oral)   Resp 18  Ht 6' 2\" (1.88 m)  Wt 146 lb 3.2 oz (66.3 kg)  SpO2 93%  BMI 18.77 kg/m2     Eyes: EOM are normal. Right eye exhibits no discharge. Left eye exhibits no discharge. No scleral icterus. Neck: Neck supple. Cardiovascular: Normal rate, regular rhythm and normal heart sounds. Exam reveals no gallop and no friction rub. No murmur heard. Pulmonary/Chest: Effort normal and breath sounds normal. No respiratory distress. He has no wheezes. He has no rales. Musculoskeletal: He exhibits no edema or tenderness. Lymphadenopathy:     He has no cervical adenopathy. Neurological: He is alert. He exhibits normal muscle tone. Skin: Skin is warm and dry. There is erythema (LLE). Psychiatric: He has a normal mood and affect.      Lab Results   Component Value Date/Time    WBC 12.1 06/05/2018 05:41 PM    HGB 11.8 (L) 06/05/2018 05:41 PM    HCT 34.2 (L) 06/05/2018 05:41 PM    PLATELET 743 50/66/8226 05:41 PM    MCV 86.4 06/05/2018 05:41 PM     Lab Results   Component Value Date/Time    Sodium 139 06/05/2018 05:41 PM    Potassium 2.9 (LL) 06/05/2018 05:41 PM    Chloride 99 (L) 06/05/2018 05:41 PM    CO2 32 06/05/2018 05:41 PM    Anion gap 8 06/05/2018 05:41 PM    Glucose 195 (H) 06/05/2018 05:41 PM    BUN 19 (H) 06/05/2018 05:41 PM    Creatinine 1.09 06/05/2018 05:41 PM    BUN/Creatinine ratio 17 06/05/2018 05:41 PM    GFR est AA >60 06/05/2018 05:41 PM    GFR est non-AA >60 06/05/2018 05:41 PM    Calcium 9.4 06/05/2018 05:41 PM     ASSESSMENT and PLAN    ICD-10-CM ICD-9-CM    1. Other chronic pain G89.29 338.29 oxyCODONE-acetaminophen (PERCOCET 10)  mg per tablet      REFERRAL TO PAIN MANAGEMENT   2. PAD (peripheral artery disease) ([facility]) I73.9 443.9 REFERRAL TO PAIN MANAGEMENT   3. Essential hypertension I10 401.9    4. Cellulitis of left lower extremity L03.116 682.6 CBC WITH AUTOMATED DIFF   5. Hypokalemia O59.4 092.2 METABOLIC PANEL, BASIC   6. Homelessness Z59.0 V60.0      Discussed expectations of chronic pain management. Will refill percocet today, but discussed that he is not escalate dose unless instructed to do so by physician and if that physician is not me, he needs to notify me within 24 hours. Pain agreement signed today. Referral entered to pain management. He will f/u with his vascular surgeon as planned next week. Will treat for cellulitis with bactrim. Repeat labs today to reassess hypokalemia. Provided with additional resource contact information for shelters/housing options. BP may be increased due to pain; will monitor and consider medication adjustments.

## 2018-06-08 NOTE — MR AVS SNAPSHOT
[address] [place] [address] 
 
 
 [address] [id number] 
[phone] Patient: Nessa Hernandez MRN: [id number] Visit Information Date & Time Provider Department Dept. Phone Encounter #  
 6/8/2018  1:15 PM Domenica Colvin, [address] [place] [address] Street [phone] [id number] Follow-up Instructions Return in about 1 month (around 7/8/2018), or if symptoms worsen or fail to improve. Your Appointments 6/18/2018  9:15 AM  
Office Visit with MD ROSI De La Rosa [facility]) Appt Note: Return in about 1 month (around 6/18/2018), or if symptoms worsen or fail to improve, for hypertension, pain. [address]  
  
   
 [address] [facility] Upcoming Health Maintenance Date Due Hepatitis C Screening 1960 Pneumococcal 19-64 Medium Risk (1 of 1 - PPSV23) 3/1/1980 DTaP/Tdap/Td series (1 - Tdap) 3/1/1982 FOBT Q 1 YEAR AGE 50-75 3/1/2011 Influenza Age 5 to Adult 8/1/2018 Allergies as of 6/8/2018  Review Complete On: 6/8/2018 By: Joaquin Bullock No Known Allergies Current Immunizations  Never Reviewed No immunizations on file. Not reviewed this visit You Were Diagnosed With   
  
 Codes Comments Other chronic pain    -  Primary ICD-10-CM: G89.29 ICD-9-CM: 338.29 PAD (peripheral artery disease) (HCC)     ICD-10-CM: I73.9 ICD-9-CM: 443.9 Essential hypertension     ICD-10-CM: I10 
ICD-9-CM: 401.9 Cellulitis of left lower extremity     ICD-10-CM: L03.116 ICD-9-CM: 682.6 Hypokalemia     ICD-10-CM: E87.6 ICD-9-CM: 276.8 Homelessness     ICD-10-CM: Z59.0 ICD-9-CM: V60.0 Vitals BP Pulse Temp Resp Height(growth percentile) Weight(growth percentile)  (!) 163/92 (BP 1 Location: Left arm, BP Patient Position: Sitting) 70 96.7 °F (35.9 °C) (Oral) 18 6' 2\" (1.88 m) 146 lb 3.2 oz (66.3 kg) SpO2 BMI Smoking Status 93% 18.77 kg/m2 Current Every Day Smoker Vitals History BMI and BSA Data Body Mass Index Body Surface Area 18.77 kg/m 2 1.86 m 2 Preferred Pharmacy Pharmacy Name Phone [id number], [address] Paulino Ordonez [phone] Your Updated Medication List  
  
   
This list is accurate as of 6/8/18  1:37 PM.  Always use your most recent med list. amLODIPine 10 mg tablet Commonly known as:  [place] Take 1 Tab by mouth daily. Indications: hypertension  
  
 aspirin 325 mg tablet Commonly known as:  ASPIRIN  
325 mg.  
  
 atorvastatin 40 mg tablet Commonly known as:  LIPITOR 40 mg.  
  
 clopidogrel 75 mg Tab Commonly known as:  PLAVIX 75 mg.  
  
 gabapentin 100 mg capsule Commonly known as:  NEURONTIN Take 1 Cap by mouth three (3) times daily. melatonin 3 mg tablet Take  by mouth. MIRALAX 17 gram packet Generic drug:  polyethylene glycol Take 17 g by mouth daily. MORPHINE  
1 Tab two (2) times a day. Morphine sulfate  15 mg  
  
 morphine CR 15 mg CR tablet Commonly known as:  MS CONTIN  
15 mg.  
  
 naloxone 4 mg/actuation nasal spray Commonly known as:  NARCAN  
4 mg.  
  
 nicotine 21 mg/24 hr  
Commonly known as:  NICODERM CQ  
1 Patch. oxyCODONE-acetaminophen  mg per tablet Commonly known as:  PERCOCET 10 Take 1 Tab by mouth every eight (8) hours as needed for Pain. Max Daily Amount: 3 Tabs. potassium chloride SR 10 mEq tablet Commonly known as:  KLOR-CON 10 Take 2 Tabs by mouth daily for 5 days. therapeutic multivitamin-minerals tablet Commonly known as:  THERAGRAN-M Take 1 Tab by Mouth Once a Day. trimethoprim-sulfamethoxazole 160-800 mg per tablet Commonly known as:  BACTRIM DS, SEPTRA DS Take 1 Tab by mouth two (2) times a day for 10 days. venlafaxine- mg capsule Commonly known as:  EFFEXOR-XR Take 1 Cap by mouth daily. Indications: ANXIETY WITH DEPRESSION Prescriptions Printed Refills  
 oxyCODONE-acetaminophen (PERCOCET 10)  mg per tablet 0 Sig: Take 1 Tab by mouth every eight (8) hours as needed for Pain. Max Daily Amount: 3 Tabs. Class: Print Route: Oral  
  
Prescriptions Sent to Pharmacy Refills  
 venlafaxine-SR (EFFEXOR-XR) 150 mg capsule 3 Sig: Take 1 Cap by mouth daily. Indications: ANXIETY WITH DEPRESSION Class: Normal  
 Pharmacy: [address] [place] [address] #: [phone] Route: Oral  
 gabapentin (NEURONTIN) 100 mg capsule 2 Sig: Take 1 Cap by mouth three (3) times daily. Class: Normal  
 Pharmacy: [address] [place] [address] #: [phone] Route: Oral  
 trimethoprim-sulfamethoxazole (BACTRIM DS, SEPTRA DS) 160-800 mg per tablet 0 Sig: Take 1 Tab by mouth two (2) times a day for 10 days. Class: Normal  
 Pharmacy: [address] [place] [address] #: [phone] Route: Oral  
  
We Performed the Following REFERRAL TO PAIN MANAGEMENT [BRB376 Custom] Comments:  
 Please evaluate patient for chronic BLE pain, amputation, PAD. Follow-up Instructions Return in about 1 month (around 7/8/2018), or if symptoms worsen or fail to improve. To-Do List   
 06/08/2018 Lab:  CBC WITH AUTOMATED DIFF   
  
 06/08/2018 Lab:  METABOLIC PANEL, BASIC Referral Information Referral ID Referred By Referred To  
  
 [id number] Vida MARCUS Not Available Visits Status Start Date End Date 1 New Request 6/8/18 6/8/19 If your referral has a status of pending review or denied, additional information will be sent to support the outcome of this decision. Patient Instructions Cellulitis: Care Instructions Your Care Instructions Cellulitis is a skin infection. It often occurs after a break in the skin from a scrape, cut, bite, or puncture, or after a rash. The doctor has checked you carefully, but problems can develop later. If you notice any problems or new symptoms, get medical treatment right away. Follow-up care is a key part of your treatment and safety. Be sure to make and go to all appointments, and call your doctor if you are having problems. It's also a good idea to know your test results and keep a list of the medicines you take. How can you care for yourself at home? · Take your antibiotics as directed. Do not stop taking them just because you feel better. You need to take the full course of antibiotics. · Prop up the infected area on pillows to reduce pain and swelling. Try to keep the area above the level of your heart as often as you can. · If your doctor told you how to care for your wound, follow your doctor's instructions. If you did not get instructions, follow this general advice: ¨ Wash the wound with clean water 2 times a day. Don't use hydrogen peroxide or alcohol, which can slow healing. ¨ You may cover the wound with a thin layer of petroleum jelly, such as Vaseline, and a nonstick bandage. ¨ Apply more petroleum jelly and replace the bandage as needed. · Be safe with medicines. Take pain medicines exactly as directed. ¨ If the doctor gave you a prescription medicine for pain, take it as prescribed. ¨ If you are not taking a prescription pain medicine, ask your doctor if you can take an over-the-counter medicine. To prevent cellulitis in the future · Try to prevent cuts, scrapes, or other injuries to your skin. Cellulitis most often occurs where there is a break in the skin. · If you get a scrape, cut, mild burn, or bite, wash the wound with clean water as soon as you can to help avoid infection. Don't use hydrogen peroxide or alcohol, which can slow healing. · If you have swelling in your legs (edema), support stockings and good skin care may help prevent leg sores and cellulitis. · Take care of your feet, especially if you have diabetes or other conditions that increase the risk of infection. Wear shoes and socks. Do not go barefoot. If you have athlete's foot or other skin problems on your feet, talk to your doctor about how to treat them. When should you call for help? Call your doctor now or seek immediate medical care if: 
? · You have signs that your infection is getting worse, such as: 
¨ Increased pain, swelling, warmth, or redness. ¨ Red streaks leading from the area. ¨ Pus draining from the area. ¨ A fever. ? · You get a rash. ? Watch closely for changes in your health, and be sure to contact your doctor if: 
? · You are not getting better after 1 day (24 hours). ? · You do not get better as expected. Where can you learn more? Go to http://krystal-jenny.info/. Frieda Nickerson in the search box to learn more about \"Cellulitis: Care Instructions. \" Current as of: October 13, 2016 Content Version: 11.4 © 5011-0464 [employer]. Care instructions adapted under license by [employer] (which disclaims liability or warranty for this information). If you have questions about a medical condition or this instruction, always ask your healthcare professional. [place] [address] any warranty or liability for your use of this information. Introducing [facility]! Gus Wesley introduces [employer] Source patient portal. Now you can access parts of your medical record, email your doctor's office, and request medication refills online. 1. In your internet browser, go to [email or web address] 2. Click on the First Time User? Click Here link in the Sign In box. You will see the New Member Sign Up page. 3. Enter your [employer] Access Code exactly as it appears below.  You will not need to use this code after youve completed the sign-up process. If you do not sign up before the expiration date, you must request a new code. · [employer] Access Code: [email or web address] Expires: 8/12/2018  2:11 PM 
 
4. Enter the last four digits of your Social Security Number (xxxx) and Date of Birth (mm/dd/yyyy) as indicated and click Submit. You will be taken to the next sign-up page. 5. Create a [employer] ID. This will be your [employer] login ID and cannot be changed, so think of one that is secure and easy to remember. 6. Create a [employer] password. You can change your password at any time. 7. Enter your Password Reset Question and Answer. This can be used at a later time if you forget your password. 8. Enter your e-mail address. You will receive e-mail notification when new information is available in [address] 9. Click Sign Up. You can now view and download portions of your medical record. 10. Click the Download Summary menu link to download a portable copy of your medical information. If you have questions, please visit the Frequently Asked Questions section of the [employer] website. Remember, [employer] is NOT to be used for urgent needs. For medical emergencies, dial 911. Now available from your iPhone and Android! Please provide this summary of care documentation to your next provider. Your primary care clinician is listed as [facility] [place]. If you have any questions after today's visit, please call [phone].

## 2018-06-10 DIAGNOSIS — E87.6 HYPOKALEMIA: Primary | ICD-10-CM

## 2018-06-10 RX ORDER — POTASSIUM CHLORIDE 750 MG/1
20 TABLET, FILM COATED, EXTENDED RELEASE ORAL DAILY
Qty: 60 TAB | Refills: 5 | Status: SHIPPED | OUTPATIENT
Start: 2018-06-10 | End: 2019-01-11

## 2018-06-11 ENCOUNTER — TELEPHONE (OUTPATIENT)
Dept: INTERNAL MEDICINE CLINIC | Age: 58
End: 2018-06-11

## 2018-06-11 NOTE — PROGRESS NOTES
Advise pt his K+ is low. I think he needs to remain on a supplement. I will send over a refill. I suspect one of his meds is causing this.

## 2018-06-11 NOTE — TELEPHONE ENCOUNTER
Received call from Dr. Raghavendra Parikh, inpatient psychiatrist at [facility], regarding the patient. Dr. Audrey Soto noted that patient tested positive for opiates, barbiturates, cocaine and THC. Patient received refill of percocet on 6/8/18, pain management referral was placed. Narcan prescribed. Dr. Audrey Soto agrees to inform patient that per clinic policy it is unlikely that he will continue to get controlled substances from this office and may have to wait for pain management. He has a 30 day supply, prescribed 3 days ago. His PCP will review when she returns to office.

## 2018-06-11 NOTE — TELEPHONE ENCOUNTER
Attempted to contact pt at  Numbers listed, unable to leave message;will send out letter at address listed

## 2018-06-11 NOTE — TELEPHONE ENCOUNTER
----- Message from Ani Esparza MD sent at 6/10/2018  8:45 PM EDT -----  Advise pt his K+ is low. I think he needs to remain on a supplement. I will send over a refill. I suspect one of his meds is causing this. Yes

## 2018-06-30 ENCOUNTER — HOSPITAL ENCOUNTER (EMERGENCY)
Age: 58
Discharge: HOME | End: 2018-06-30
Attending: EMERGENCY MEDICINE
Payer: MEDICAID

## 2018-06-30 VITALS
HEART RATE: 95 BPM | TEMPERATURE: 98.1 F | RESPIRATION RATE: 22 BRPM | DIASTOLIC BLOOD PRESSURE: 91 MMHG | BODY MASS INDEX: 18.36 KG/M2 | OXYGEN SATURATION: 99 % | SYSTOLIC BLOOD PRESSURE: 159 MMHG | WEIGHT: 143 LBS

## 2018-06-30 DIAGNOSIS — I73.9 PAD (PERIPHERAL ARTERY DISEASE) (HCC): ICD-10-CM

## 2018-06-30 DIAGNOSIS — M79.671 PAIN IN BOTH FEET: Primary | ICD-10-CM

## 2018-06-30 DIAGNOSIS — M79.672 PAIN IN BOTH FEET: Primary | ICD-10-CM

## 2018-06-30 LAB
ANION GAP SERPL CALC-SCNC: 7 MMOL/L (ref 3–18)
BASOPHILS # BLD: 0.2 K/UL (ref 0–0.06)
BASOPHILS NFR BLD: 2 % (ref 0–2)
BUN SERPL-MCNC: 14 MG/DL (ref 7–18)
BUN/CREAT SERPL: 12 (ref 12–20)
CALCIUM SERPL-MCNC: 9.5 MG/DL (ref 8.5–10.1)
CHLORIDE SERPL-SCNC: 103 MMOL/L (ref 100–108)
CO2 SERPL-SCNC: 30 MMOL/L (ref 21–32)
CREAT SERPL-MCNC: 1.15 MG/DL (ref 0.6–1.3)
DIFFERENTIAL METHOD BLD: ABNORMAL
EOSINOPHIL # BLD: 0.5 K/UL (ref 0–0.4)
EOSINOPHIL NFR BLD: 7 % (ref 0–5)
ERYTHROCYTE [DISTWIDTH] IN BLOOD BY AUTOMATED COUNT: 14.3 % (ref 11.6–14.5)
GLUCOSE SERPL-MCNC: 95 MG/DL (ref 74–99)
HCT VFR BLD AUTO: 28.2 % (ref 36–48)
HGB BLD-MCNC: 9.5 G/DL (ref 13–16)
LACTATE BLD-SCNC: 1.5 MMOL/L (ref 0.4–2)
LYMPHOCYTES # BLD: 2.4 K/UL (ref 0.9–3.6)
LYMPHOCYTES NFR BLD: 30 % (ref 21–52)
MCH RBC QN AUTO: 29.4 PG (ref 24–34)
MCHC RBC AUTO-ENTMCNC: 33.7 G/DL (ref 31–37)
MCV RBC AUTO: 87.3 FL (ref 74–97)
MONOCYTES # BLD: 0.5 K/UL (ref 0.05–1.2)
MONOCYTES NFR BLD: 6 % (ref 3–10)
NEUTS SEG # BLD: 4.5 K/UL (ref 1.8–8)
NEUTS SEG NFR BLD: 55 % (ref 40–73)
PLATELET # BLD AUTO: 368 K/UL (ref 135–420)
PMV BLD AUTO: 9 FL (ref 9.2–11.8)
POTASSIUM SERPL-SCNC: 3.3 MMOL/L (ref 3.5–5.5)
RBC # BLD AUTO: 3.23 M/UL (ref 4.7–5.5)
SODIUM SERPL-SCNC: 140 MMOL/L (ref 136–145)
WBC # BLD AUTO: 8 K/UL (ref 4.6–13.2)

## 2018-06-30 PROCEDURE — 85025 COMPLETE CBC W/AUTO DIFF WBC: CPT | Performed by: EMERGENCY MEDICINE

## 2018-06-30 PROCEDURE — 83605 ASSAY OF LACTIC ACID: CPT

## 2018-06-30 PROCEDURE — 80048 BASIC METABOLIC PNL TOTAL CA: CPT | Performed by: EMERGENCY MEDICINE

## 2018-06-30 PROCEDURE — 99285 EMERGENCY DEPT VISIT HI MDM: CPT

## 2018-06-30 PROCEDURE — 74011250637 HC RX REV CODE- 250/637: Performed by: NURSE PRACTITIONER

## 2018-06-30 RX ORDER — HYDROCODONE BITARTRATE AND ACETAMINOPHEN 5; 325 MG/1; MG/1
1 TABLET ORAL
Status: COMPLETED | OUTPATIENT
Start: 2018-06-30 | End: 2018-06-30

## 2018-06-30 RX ORDER — HYDROCODONE BITARTRATE AND ACETAMINOPHEN 5; 325 MG/1; MG/1
1 TABLET ORAL
Qty: 12 TAB | Refills: 0 | Status: SHIPPED | OUTPATIENT
Start: 2018-06-30 | End: 2019-01-11

## 2018-06-30 RX ADMIN — HYDROCODONE BITARTRATE AND ACETAMINOPHEN 1 TABLET: 5; 325 TABLET ORAL at 22:44

## 2018-07-01 NOTE — ED TRIAGE NOTES
Pt states \"i was walking around today and over did it and have PAD and my leg is swollen oh and I need something for my sunburn\"

## 2018-07-01 NOTE — DISCHARGE INSTRUCTIONS
Imagiin. Activation    Thank you for requesting access to Imagiin.. Please follow the instructions below to securely access and download your online medical record. Imagiin. allows you to send messages to your doctor, view your test results, renew your prescriptions, schedule appointments, and more. How Do I Sign Up? 1. In your internet browser, go to [email or web address]  2. Click on the First Time User? Click Here link in the Sign In box. You will be redirect to the New Member Sign Up page. 3. Enter your [employer]. Access Code exactly as it appears below. You will not need to use this code after youve completed the sign-up process. If you do not sign up before the expiration date, you must request a new code. Imagiin. Access Code: [email or web address]  Expires: 2018  2:11 PM (This is the date your Imagiin. access code will )    4. Enter the last four digits of your Social Security Number (xxxx) and Date of Birth (mm/dd/yyyy) as indicated and click Submit. You will be taken to the next sign-up page. 5. Create a Imagiin. ID. This will be your Imagiin. login ID and cannot be changed, so think of one that is secure and easy to remember. 6. Create a Imagiin. password. You can change your password at any time. 7. Enter your Password Reset Question and Answer. This can be used at a later time if you forget your password. 8. Enter your e-mail address. You will receive e-mail notification when new information is available in [address] 9. Click Sign Up. You can now view and download portions of your medical record. 10. Click the Download Summary menu link to download a portable copy of your medical information. Additional Information    If you have questions, please visit the Frequently Asked Questions section of the Imagiin. website at [email or web address] MTX Connect/World [employer] Networkhart/. Remember, Imagiin. is NOT to be used for urgent needs. For medical emergencies, dial 911.

## 2018-07-01 NOTE — ED PROVIDER NOTES
HPI Comments: Lauren Fermin is as 63year old male who presents to the ED with a c/o bilateral feet pain. Pt has had recent surgery on the left foot for gangrene. States he has an upcoming appointment with his PCP but he is out of pasin medication. Ads that he is not in a pain management program.   Pt was at a bus stop and called rescue for transport here. He is attempting to get back to the [employer]. States he \"got a little froggy\" there earlier today, and went out to see a friend and get him out of the situation he was in there. Patient is a 63 y.o. male presenting with lower extremity edema and sunburn. The history is provided by the patient and the EMS personnel. History limited by: No communication barrier. Leg Swelling    This is a new problem. The current episode started 2 days ago. The problem occurs constantly. The problem has not changed since onset. Pain location: sore throat. Sunburn          Past Medical History:   Diagnosis Date    Calculus of kidney     Depression     Hypertension     Sleep disorder     Vascular disorder of lower extremity     bilateral       Past Surgical History:   Procedure Laterality Date    HX AMPUTATION TOE Left     x 5 toes    HX HEENT      VASCULAR SURGERY PROCEDURE UNLIST           Family History:   Problem Relation Age of Onset    Arthritis-osteo Mother     Diabetes Father     Heart Disease Father     Melanoma Father     Psychiatric Disorder Brother        Social History     Social History    Marital status:      Spouse name: N/A    Number of children: N/A    Years of education: N/A     Occupational History    Not on file.      Social History Main Topics    Smoking status: Current Every Day Smoker     Packs/day: 0.25    Smokeless tobacco: Never Used    Alcohol use No    Drug use: 1.00 per week     Special: Marijuana      Comment: rarely    Sexual activity: Not Currently     Other Topics Concern    Not on file     Social History Narrative    ** Merged History Encounter **              ALLERGIES: Review of patient's allergies indicates no known allergies. Review of Systems   Constitutional: Negative. HENT: Negative. Eyes: Negative. Respiratory: Negative. Cardiovascular: Negative. Gastrointestinal: Negative. Endocrine: Negative. Genitourinary: Negative. Musculoskeletal: Positive for arthralgias (biltaeral foot pain). Skin: Negative. Allergic/Immunologic: Negative. Neurological: Negative. Hematological: Negative. Psychiatric/Behavioral: Negative. Vitals:    06/30/18 2130 06/30/18 2215   BP: 135/86 134/81   Pulse: 93 91   Resp: 16 15   Temp: 98.1 °F (36.7 °C)    SpO2: 96% 100%   Weight: 64.9 kg (143 lb)             Physical Exam   Constitutional: He is oriented to person, place, and time. He appears well-developed and well-nourished. No distress. HENT:   Head: Normocephalic and atraumatic. Eyes: Pupils are equal, round, and reactive to light. Neck: Normal range of motion. Neck supple. Cardiovascular: Normal rate, regular rhythm, normal heart sounds and intact distal pulses. Pulmonary/Chest: Effort normal and breath sounds normal. He has no wheezes. He has no rales. Abdominal: Soft. Bowel sounds are normal.   Genitourinary:   Genitourinary Comments: NE   Musculoskeletal: Normal range of motion. The ends of the left foot are blackened. The left foot digits have been surgically removed. Neurological: He is alert and oriented to person, place, and time. Skin: Skin is warm and dry. Psychiatric: He has a normal mood and affect. Nursing note and vitals reviewed. MDM  Number of Diagnoses or Management Options  PAD (peripheral artery disease) ([facility] [address]):   Pain in both feet:   Diagnosis management comments: MDM:  Will DC the Pt to home so he can get a cab to the [employer].   Amy Archibald NP  11:25 PM        Risk of Complications, Morbidity, and/or Mortality  Presenting problems: low  Diagnostic procedures: low  Management options: low    Patient Progress  Patient progress: stable        ED Course       Procedures    Diagnosis:   1. Pain in both feet    2. PAD (peripheral artery disease) ([facility])          Disposition:   Discharged to Home. Follow-up Information     Follow up With Details Comments Contact Info    Arabella Martinez MD Go to Your appointment on Tuesday as planned. [facility]            Patient's Medications   Start Taking    HYDROCODONE-ACETAMINOPHEN (NORCO) 5-325 MG PER TABLET    Take 1 Tab by mouth every six (6) hours as needed for Pain. Max Daily Amount: 4 Tabs. Continue Taking    (NO MEDICATION SELECTED)    1 Tab two (2) times a day. Morphine sulfate  15 mg    AMLODIPINE (NORVASC) 10 MG TABLET    Take 1 Tab by mouth daily. Indications: hypertension    ASPIRIN (ASPIRIN) 325 MG TABLET    325 mg. ATORVASTATIN (LIPITOR) 40 MG TABLET    40 mg. CLOPIDOGREL (PLAVIX) 75 MG TAB    75 mg. GABAPENTIN (NEURONTIN) 100 MG CAPSULE    Take 1 Cap by mouth three (3) times daily. MELATONIN 3 MG TABLET    Take  by mouth. MORPHINE CR (MS CONTIN) 15 MG CR TABLET    15 mg. NALOXONE (NARCAN) 4 MG/ACTUATION NASAL SPRAY    4 mg. NICOTINE (NICODERM CQ) 21 MG/24 HR    1 Patch. OXYCODONE-ACETAMINOPHEN (PERCOCET 10)  MG PER TABLET    Take 1 Tab by mouth every eight (8) hours as needed for Pain. Max Daily Amount: 3 Tabs. POLYETHYLENE GLYCOL (MIRALAX) 17 GRAM PACKET    Take 17 g by mouth daily. POTASSIUM CHLORIDE SR (KLOR-CON 10) 10 MEQ TABLET    Take 2 Tabs by mouth daily. Indications: hypokalemia    THERAPEUTIC MULTIVITAMIN-MINERALS (THERAGRAN-M) TABLET    Take 1 Tab by Mouth Once a Day. VENLAFAXINE-SR (EFFEXOR-XR) 150 MG CAPSULE    Take 1 Cap by mouth daily.  Indications: ANXIETY WITH DEPRESSION   These Medications have changed    No medications on file   Stop Taking    No medications on file

## 2018-07-09 ENCOUNTER — TELEPHONE (OUTPATIENT)
Dept: INTERNAL MEDICINE CLINIC | Age: 58
End: 2018-07-09

## 2018-07-09 NOTE — TELEPHONE ENCOUNTER
Incoming call from [facility]. 2 pt identifiers confirmed. Pharm wanted to inform MD that today 07/09/2018 pt had brought in a prescription for Norco that he received from ER on 06/30/2018. Pharm contacted ER and informed them that according to pt  he should still have a weeks work of Percocet from PCP.  ER informed pharm not to fill prescription for Norco.     DEVAN

## 2019-01-02 ENCOUNTER — HOSPITAL ENCOUNTER (INPATIENT)
Age: 59
LOS: 9 days | Discharge: HOME | DRG: 229 | End: 2019-01-11
Attending: EMERGENCY MEDICINE | Admitting: HOSPITALIST
Payer: MEDICAID

## 2019-01-02 ENCOUNTER — APPOINTMENT (OUTPATIENT)
Dept: GENERAL RADIOLOGY | Age: 59
DRG: 229 | End: 2019-01-02
Attending: HOSPITALIST
Payer: MEDICAID

## 2019-01-02 ENCOUNTER — APPOINTMENT (OUTPATIENT)
Dept: GENERAL RADIOLOGY | Age: 59
DRG: 229 | End: 2019-01-02
Attending: EMERGENCY MEDICINE
Payer: MEDICAID

## 2019-01-02 DIAGNOSIS — E83.51 HYPOCALCEMIA: ICD-10-CM

## 2019-01-02 DIAGNOSIS — K92.2 ACUTE UPPER GI BLEED: ICD-10-CM

## 2019-01-02 DIAGNOSIS — E87.6 HYPOKALEMIA: ICD-10-CM

## 2019-01-02 DIAGNOSIS — I48.91 ATRIAL FIBRILLATION WITH RVR (HCC): Primary | ICD-10-CM

## 2019-01-02 PROBLEM — I73.9 PAD (PERIPHERAL ARTERY DISEASE) (HCC): Status: ACTIVE | Noted: 2019-01-02

## 2019-01-02 PROBLEM — D62 ACUTE BLOOD LOSS ANEMIA: Status: ACTIVE | Noted: 2019-01-02

## 2019-01-02 LAB
ALBUMIN SERPL-MCNC: 2 G/DL (ref 3.4–5)
ALBUMIN/GLOB SERPL: 1 {RATIO} (ref 0.8–1.7)
ALP SERPL-CCNC: 74 U/L (ref 45–117)
ALT SERPL-CCNC: 16 U/L (ref 16–61)
ANION GAP BLD CALC-SCNC: 24 MMOL/L (ref 10–20)
ANION GAP SERPL CALC-SCNC: 13 MMOL/L (ref 3–18)
ANION GAP SERPL CALC-SCNC: 8 MMOL/L (ref 3–18)
APPEARANCE UR: CLEAR
APTT PPP: 35.3 SEC (ref 23–36.4)
AST SERPL-CCNC: 19 U/L (ref 15–37)
ATRIAL RATE: 131 BPM
BACTERIA URNS QL MICRO: ABNORMAL /HPF
BASOPHILS # BLD: 0.1 K/UL (ref 0–0.1)
BASOPHILS NFR BLD: 0 % (ref 0–2)
BILIRUB SERPL-MCNC: 0.4 MG/DL (ref 0.2–1)
BILIRUB UR QL: NEGATIVE
BUN BLD-MCNC: 33 MG/DL (ref 7–18)
BUN SERPL-MCNC: 39 MG/DL (ref 7–18)
BUN SERPL-MCNC: 48 MG/DL (ref 7–18)
BUN/CREAT SERPL: 42 (ref 12–20)
BUN/CREAT SERPL: 51 (ref 12–20)
CA-I BLD-MCNC: 0.88 MMOL/L (ref 1.12–1.32)
CALCIUM SERPL-MCNC: 5.4 MG/DL (ref 8.5–10.1)
CALCIUM SERPL-MCNC: 7.9 MG/DL (ref 8.5–10.1)
CALCULATED R AXIS, ECG10: 48 DEGREES
CALCULATED T AXIS, ECG11: 90 DEGREES
CHLORIDE BLD-SCNC: 112 MMOL/L (ref 100–108)
CHLORIDE SERPL-SCNC: 110 MMOL/L (ref 100–108)
CHLORIDE SERPL-SCNC: 120 MMOL/L (ref 100–108)
CK MB CFR SERPL CALC: ABNORMAL % (ref 0–4)
CK MB SERPL-MCNC: <1 NG/ML (ref 5–25)
CK SERPL-CCNC: 20 U/L (ref 39–308)
CO2 BLD-SCNC: 14 MMOL/L (ref 19–24)
CO2 SERPL-SCNC: 15 MMOL/L (ref 21–32)
CO2 SERPL-SCNC: 22 MMOL/L (ref 21–32)
COLOR UR: YELLOW
CREAT SERPL-MCNC: 0.93 MG/DL (ref 0.6–1.3)
CREAT SERPL-MCNC: 0.95 MG/DL (ref 0.6–1.3)
CREAT UR-MCNC: 0.9 MG/DL (ref 0.6–1.3)
DIAGNOSIS, 93000: NORMAL
DIFFERENTIAL METHOD BLD: ABNORMAL
EOSINOPHIL # BLD: 0.1 K/UL (ref 0–0.4)
EOSINOPHIL NFR BLD: 0 % (ref 0–5)
EPITH CASTS URNS QL MICRO: ABNORMAL /LPF (ref 0–5)
ERYTHROCYTE [DISTWIDTH] IN BLOOD BY AUTOMATED COUNT: 13.3 % (ref 11.6–14.5)
GLOBULIN SER CALC-MCNC: 2 G/DL (ref 2–4)
GLUCOSE BLD STRIP.AUTO-MCNC: 149 MG/DL (ref 74–106)
GLUCOSE SERPL-MCNC: 129 MG/DL (ref 74–99)
GLUCOSE SERPL-MCNC: 182 MG/DL (ref 74–99)
GLUCOSE UR STRIP.AUTO-MCNC: NEGATIVE MG/DL
HCT VFR BLD AUTO: 23.9 % (ref 36–48)
HCT VFR BLD CALC: 26 % (ref 36–49)
HGB BLD-MCNC: 8.1 G/DL (ref 13–16)
HGB BLD-MCNC: 8.8 G/DL (ref 12–16)
HGB UR QL STRIP: NEGATIVE
HYALINE CASTS URNS QL MICRO: ABNORMAL /LPF (ref 0–2)
INR PPP: 1.7 (ref 0.8–1.2)
KETONES UR QL STRIP.AUTO: ABNORMAL MG/DL
LEUKOCYTE ESTERASE UR QL STRIP.AUTO: ABNORMAL
LYMPHOCYTES # BLD: 1 K/UL (ref 0.9–3.6)
LYMPHOCYTES NFR BLD: 6 % (ref 21–52)
MAGNESIUM SERPL-MCNC: 1.6 MG/DL (ref 1.6–2.6)
MCH RBC QN AUTO: 31.2 PG (ref 24–34)
MCHC RBC AUTO-ENTMCNC: 33.9 G/DL (ref 31–37)
MCV RBC AUTO: 91.9 FL (ref 74–97)
MONOCYTES # BLD: 1 K/UL (ref 0.05–1.2)
MONOCYTES NFR BLD: 6 % (ref 3–10)
MUCOUS THREADS URNS QL MICRO: ABNORMAL /LPF
NEUTS SEG # BLD: 14.7 K/UL (ref 1.8–8)
NEUTS SEG NFR BLD: 88 % (ref 40–73)
NITRITE UR QL STRIP.AUTO: NEGATIVE
PH UR STRIP: 5.5 [PH] (ref 5–8)
PLATELET # BLD AUTO: 236 K/UL (ref 135–420)
PMV BLD AUTO: 9.8 FL (ref 9.2–11.8)
POTASSIUM BLD-SCNC: 2.2 MMOL/L (ref 3.5–5.5)
POTASSIUM SERPL-SCNC: 2.4 MMOL/L (ref 3.5–5.5)
POTASSIUM SERPL-SCNC: 3.5 MMOL/L (ref 3.5–5.5)
PROT SERPL-MCNC: 4 G/DL (ref 6.4–8.2)
PROT UR STRIP-MCNC: ABNORMAL MG/DL
PROTHROMBIN TIME: 20.1 SEC (ref 11.5–15.2)
Q-T INTERVAL, ECG07: 282 MS
QRS DURATION, ECG06: 96 MS
QTC CALCULATION (BEZET), ECG08: 427 MS
RBC # BLD AUTO: 2.6 M/UL (ref 4.7–5.5)
RBC #/AREA URNS HPF: 0 /HPF (ref 0–5)
RETICS/RBC NFR AUTO: 2.6 % (ref 0.5–2.3)
SODIUM BLD-SCNC: 147 MMOL/L (ref 136–145)
SODIUM SERPL-SCNC: 140 MMOL/L (ref 136–145)
SODIUM SERPL-SCNC: 148 MMOL/L (ref 136–145)
SP GR UR REFRACTOMETRY: 1.02 (ref 1–1.03)
TROPONIN I SERPL-MCNC: 0.05 NG/ML (ref 0–0.04)
UROBILINOGEN UR QL STRIP.AUTO: 0.2 EU/DL (ref 0.2–1)
VENTRICULAR RATE, ECG03: 138 BPM
WBC # BLD AUTO: 16.9 K/UL (ref 4.6–13.2)
WBC URNS QL MICRO: ABNORMAL /HPF (ref 0–4)

## 2019-01-02 PROCEDURE — 83550 IRON BINDING TEST: CPT

## 2019-01-02 PROCEDURE — 96368 THER/DIAG CONCURRENT INF: CPT

## 2019-01-02 PROCEDURE — C9113 INJ PANTOPRAZOLE SODIUM, VIA: HCPCS | Performed by: EMERGENCY MEDICINE

## 2019-01-02 PROCEDURE — 85730 THROMBOPLASTIN TIME PARTIAL: CPT

## 2019-01-02 PROCEDURE — 36569 INSJ PICC 5 YR+ W/O IMAGING: CPT | Performed by: EMERGENCY MEDICINE

## 2019-01-02 PROCEDURE — 71045 X-RAY EXAM CHEST 1 VIEW: CPT

## 2019-01-02 PROCEDURE — 74011250637 HC RX REV CODE- 250/637: Performed by: HOSPITALIST

## 2019-01-02 PROCEDURE — 81001 URINALYSIS AUTO W/SCOPE: CPT

## 2019-01-02 PROCEDURE — 74011000250 HC RX REV CODE- 250

## 2019-01-02 PROCEDURE — 74011000250 HC RX REV CODE- 250: Performed by: EMERGENCY MEDICINE

## 2019-01-02 PROCEDURE — 96375 TX/PRO/DX INJ NEW DRUG ADDON: CPT

## 2019-01-02 PROCEDURE — 80047 BASIC METABLC PNL IONIZED CA: CPT

## 2019-01-02 PROCEDURE — 76937 US GUIDE VASCULAR ACCESS: CPT | Performed by: EMERGENCY MEDICINE

## 2019-01-02 PROCEDURE — 99285 EMERGENCY DEPT VISIT HI MDM: CPT

## 2019-01-02 PROCEDURE — 65610000006 HC RM INTENSIVE CARE

## 2019-01-02 PROCEDURE — 82550 ASSAY OF CK (CPK): CPT

## 2019-01-02 PROCEDURE — 83735 ASSAY OF MAGNESIUM: CPT

## 2019-01-02 PROCEDURE — 82747 ASSAY OF FOLIC ACID RBC: CPT

## 2019-01-02 PROCEDURE — 96365 THER/PROPH/DIAG IV INF INIT: CPT

## 2019-01-02 PROCEDURE — 82607 VITAMIN B-12: CPT

## 2019-01-02 PROCEDURE — 74011000258 HC RX REV CODE- 258: Performed by: HOSPITALIST

## 2019-01-02 PROCEDURE — 74011000258 HC RX REV CODE- 258: Performed by: EMERGENCY MEDICINE

## 2019-01-02 PROCEDURE — C1751 CATH, INF, PER/CENT/MIDLINE: HCPCS

## 2019-01-02 PROCEDURE — 85045 AUTOMATED RETICULOCYTE COUNT: CPT

## 2019-01-02 PROCEDURE — 74011250636 HC RX REV CODE- 250/636: Performed by: EMERGENCY MEDICINE

## 2019-01-02 PROCEDURE — 93005 ELECTROCARDIOGRAM TRACING: CPT

## 2019-01-02 PROCEDURE — 36592 COLLECT BLOOD FROM PICC: CPT

## 2019-01-02 PROCEDURE — 83540 ASSAY OF IRON: CPT

## 2019-01-02 PROCEDURE — 80053 COMPREHEN METABOLIC PANEL: CPT

## 2019-01-02 PROCEDURE — 85025 COMPLETE CBC W/AUTO DIFF WBC: CPT

## 2019-01-02 PROCEDURE — 85610 PROTHROMBIN TIME: CPT

## 2019-01-02 PROCEDURE — 86900 BLOOD TYPING SEROLOGIC ABO: CPT

## 2019-01-02 PROCEDURE — 82728 ASSAY OF FERRITIN: CPT

## 2019-01-02 PROCEDURE — 77030037870 HC GLD SHT PREVALON SAGE -B

## 2019-01-02 PROCEDURE — 77030037878 HC DRSG MEPILEX >48IN BORD MOLN -B

## 2019-01-02 PROCEDURE — 74011250636 HC RX REV CODE- 250/636: Performed by: HOSPITALIST

## 2019-01-02 PROCEDURE — 86923 COMPATIBILITY TEST ELECTRIC: CPT

## 2019-01-02 RX ORDER — GABAPENTIN 100 MG/1
100 CAPSULE ORAL 3 TIMES DAILY
Status: DISCONTINUED | OUTPATIENT
Start: 2019-01-02 | End: 2019-01-03

## 2019-01-02 RX ORDER — SODIUM CHLORIDE 9 MG/ML
50 INJECTION, SOLUTION INTRAVENOUS CONTINUOUS
Status: DISPENSED | OUTPATIENT
Start: 2019-01-02 | End: 2019-01-07

## 2019-01-02 RX ORDER — CALCIUM GLUCONATE 94 MG/ML
1 INJECTION, SOLUTION INTRAVENOUS ONCE
Status: COMPLETED | OUTPATIENT
Start: 2019-01-02 | End: 2019-01-02

## 2019-01-02 RX ORDER — SODIUM CHLORIDE 0.9 % (FLUSH) 0.9 %
10 SYRINGE (ML) INJECTION AS NEEDED
Status: DISCONTINUED | OUTPATIENT
Start: 2019-01-02 | End: 2019-01-11 | Stop reason: HOSPADM

## 2019-01-02 RX ORDER — BACITRACIN 500 UNIT/G
1 PACKET (EA) TOPICAL AS NEEDED
Status: DISCONTINUED | OUTPATIENT
Start: 2019-01-02 | End: 2019-01-11 | Stop reason: HOSPADM

## 2019-01-02 RX ORDER — SODIUM CHLORIDE 0.9 % (FLUSH) 0.9 %
5-10 SYRINGE (ML) INJECTION AS NEEDED
Status: DISCONTINUED | OUTPATIENT
Start: 2019-01-02 | End: 2019-01-10

## 2019-01-02 RX ORDER — MAGNESIUM SULFATE HEPTAHYDRATE 40 MG/ML
2 INJECTION, SOLUTION INTRAVENOUS ONCE
Status: COMPLETED | OUTPATIENT
Start: 2019-01-02 | End: 2019-01-02

## 2019-01-02 RX ORDER — ATORVASTATIN CALCIUM 40 MG/1
40 TABLET, FILM COATED ORAL
Status: DISCONTINUED | OUTPATIENT
Start: 2019-01-02 | End: 2019-01-11 | Stop reason: HOSPADM

## 2019-01-02 RX ORDER — DILTIAZEM HYDROCHLORIDE 5 MG/ML
15 INJECTION INTRAVENOUS
Status: COMPLETED | OUTPATIENT
Start: 2019-01-02 | End: 2019-01-02

## 2019-01-02 RX ORDER — DIGOXIN 0.25 MG/ML
500 INJECTION INTRAMUSCULAR; INTRAVENOUS
Status: COMPLETED | OUTPATIENT
Start: 2019-01-02 | End: 2019-01-02

## 2019-01-02 RX ORDER — SODIUM CHLORIDE 0.9 % (FLUSH) 0.9 %
10-30 SYRINGE (ML) INJECTION AS NEEDED
Status: DISCONTINUED | OUTPATIENT
Start: 2019-01-02 | End: 2019-01-11 | Stop reason: HOSPADM

## 2019-01-02 RX ORDER — POTASSIUM CHLORIDE 20 MEQ/1
40 TABLET, EXTENDED RELEASE ORAL
Status: COMPLETED | OUTPATIENT
Start: 2019-01-03 | End: 2019-01-02

## 2019-01-02 RX ORDER — SODIUM CHLORIDE 0.9 % (FLUSH) 0.9 %
5-10 SYRINGE (ML) INJECTION EVERY 8 HOURS
Status: DISCONTINUED | OUTPATIENT
Start: 2019-01-02 | End: 2019-01-11 | Stop reason: HOSPADM

## 2019-01-02 RX ORDER — SODIUM CHLORIDE 0.9 % (FLUSH) 0.9 %
10-40 SYRINGE (ML) INJECTION EVERY 8 HOURS
Status: DISCONTINUED | OUTPATIENT
Start: 2019-01-02 | End: 2019-01-11 | Stop reason: HOSPADM

## 2019-01-02 RX ORDER — ASPIRIN 325 MG
325 TABLET ORAL DAILY
Status: DISCONTINUED | OUTPATIENT
Start: 2019-01-04 | End: 2019-01-11 | Stop reason: HOSPADM

## 2019-01-02 RX ORDER — PANTOPRAZOLE SODIUM 40 MG/10ML
80 INJECTION, POWDER, LYOPHILIZED, FOR SOLUTION INTRAVENOUS
Status: COMPLETED | OUTPATIENT
Start: 2019-01-02 | End: 2019-01-02

## 2019-01-02 RX ORDER — VENLAFAXINE HYDROCHLORIDE 150 MG/1
150 CAPSULE, EXTENDED RELEASE ORAL DAILY
Status: DISCONTINUED | OUTPATIENT
Start: 2019-01-03 | End: 2019-01-03 | Stop reason: ALTCHOICE

## 2019-01-02 RX ORDER — SODIUM CHLORIDE 9 MG/ML
150 INJECTION, SOLUTION INTRAVENOUS ONCE
Status: COMPLETED | OUTPATIENT
Start: 2019-01-02 | End: 2019-01-02

## 2019-01-02 RX ORDER — SODIUM CHLORIDE 0.9 % (FLUSH) 0.9 %
10 SYRINGE (ML) INJECTION EVERY 24 HOURS
Status: DISCONTINUED | OUTPATIENT
Start: 2019-01-02 | End: 2019-01-11 | Stop reason: HOSPADM

## 2019-01-02 RX ORDER — DILTIAZEM HYDROCHLORIDE 5 MG/ML
INJECTION INTRAVENOUS
Status: COMPLETED
Start: 2019-01-02 | End: 2019-01-02

## 2019-01-02 RX ORDER — POTASSIUM CHLORIDE 7.45 MG/ML
10 INJECTION INTRAVENOUS
Status: COMPLETED | OUTPATIENT
Start: 2019-01-02 | End: 2019-01-02

## 2019-01-02 RX ADMIN — DILTIAZEM HYDROCHLORIDE 15 MG: 5 INJECTION INTRAVENOUS at 10:05

## 2019-01-02 RX ADMIN — Medication 10 ML: at 17:28

## 2019-01-02 RX ADMIN — GABAPENTIN 100 MG: 100 CAPSULE ORAL at 21:36

## 2019-01-02 RX ADMIN — DILTIAZEM HYDROCHLORIDE: 5 INJECTION, SOLUTION INTRAVENOUS at 20:00

## 2019-01-02 RX ADMIN — SODIUM CHLORIDE 8 MG/HR: 900 INJECTION INTRAVENOUS at 10:27

## 2019-01-02 RX ADMIN — SODIUM CHLORIDE 8 MG/HR: 900 INJECTION INTRAVENOUS at 13:02

## 2019-01-02 RX ADMIN — POTASSIUM CHLORIDE 10 MEQ: 7.46 INJECTION, SOLUTION INTRAVENOUS at 10:36

## 2019-01-02 RX ADMIN — MAGNESIUM SULFATE HEPTAHYDRATE 2 G: 40 INJECTION, SOLUTION INTRAVENOUS at 15:30

## 2019-01-02 RX ADMIN — SODIUM CHLORIDE 150 ML/HR: 900 INJECTION, SOLUTION INTRAVENOUS at 11:55

## 2019-01-02 RX ADMIN — SODIUM CHLORIDE 1000 ML: 900 INJECTION, SOLUTION INTRAVENOUS at 09:00

## 2019-01-02 RX ADMIN — Medication 10 ML: at 22:00

## 2019-01-02 RX ADMIN — SODIUM CHLORIDE 8 MG/HR: 900 INJECTION INTRAVENOUS at 18:00

## 2019-01-02 RX ADMIN — POTASSIUM CHLORIDE 10 MEQ: 7.46 INJECTION, SOLUTION INTRAVENOUS at 11:52

## 2019-01-02 RX ADMIN — POTASSIUM CHLORIDE 40 MEQ: 20 TABLET, EXTENDED RELEASE ORAL at 23:53

## 2019-01-02 RX ADMIN — SODIUM CHLORIDE 75 ML/HR: 900 INJECTION, SOLUTION INTRAVENOUS at 17:00

## 2019-01-02 RX ADMIN — DIGOXIN 500 MCG: 250 INJECTION, SOLUTION INTRAMUSCULAR; INTRAVENOUS; PARENTERAL at 12:16

## 2019-01-02 RX ADMIN — DILTIAZEM HYDROCHLORIDE 10 MG/HR: 5 INJECTION INTRAVENOUS at 11:40

## 2019-01-02 RX ADMIN — POTASSIUM CHLORIDE 10 MEQ: 7.46 INJECTION, SOLUTION INTRAVENOUS at 13:02

## 2019-01-02 RX ADMIN — ATORVASTATIN CALCIUM 40 MG: 40 TABLET, FILM COATED ORAL at 21:35

## 2019-01-02 RX ADMIN — DILTIAZEM HYDROCHLORIDE 15 MG/HR: 5 INJECTION INTRAVENOUS at 12:39

## 2019-01-02 RX ADMIN — CALCIUM GLUCONATE 1 G: 98 INJECTION, SOLUTION INTRAVENOUS at 11:32

## 2019-01-02 RX ADMIN — DILTIAZEM HYDROCHLORIDE 15 MG/HR: 5 INJECTION INTRAVENOUS at 19:52

## 2019-01-02 RX ADMIN — CALCIUM GLUCONATE 2 G: 94 INJECTION, SOLUTION INTRAVENOUS at 18:00

## 2019-01-02 RX ADMIN — PANTOPRAZOLE SODIUM 80 MG: 40 INJECTION, POWDER, FOR SOLUTION INTRAVENOUS at 10:11

## 2019-01-02 RX ADMIN — Medication 10 ML: at 17:29

## 2019-01-02 RX ADMIN — POTASSIUM CHLORIDE 10 MEQ: 7.46 INJECTION, SOLUTION INTRAVENOUS at 14:11

## 2019-01-02 RX ADMIN — MAGNESIUM SULFATE HEPTAHYDRATE 2 G: 40 INJECTION, SOLUTION INTRAVENOUS at 10:32

## 2019-01-02 NOTE — PROGRESS NOTES
1343  Received pt from ER aboard stretcher accompanied by ER nurse and Tech. Awake, alert, oriented x 4, able to move self from stretcher to bed. Skin warm and dry, pale, c/o feeling cold and thirsty. NS bolus finishing, electrolyte repletion completed in ER with exception of KCl  
1600  Additional electrolyte repletion still in progress. Admission hx database completed, pt unsure where he will stay post discharge. States has lost assistive devices due to homelessness. 1900  Bedside and Verbal shift change report given to David Cummings (oncoming nurse) by Shad Villalba RN (offgoing nurse). Report included the following information SBAR, Kardex, ED Summary, Intake/Output, MAR, Accordion, Recent Results, Cardiac Rhythm Atrial Flutter. and Quality Measures.

## 2019-01-02 NOTE — ED TRIAGE NOTES
Patient received from rescue, patient with generalized fatigue and weakness, nausea, and rapid heart rate x 3 days

## 2019-01-02 NOTE — ED PROVIDER NOTES
EMERGENCY DEPARTMENT HISTORY AND PHYSICAL EXAM 
 
9:50 AM 
 
 
Date: 1/2/2019 Patient Name: Robbi Matamoros History of Presenting Illness Chief Complaint Patient presents with  Rapid Heart Rate  Lethargy History Provided By: Patient Chief Complaint: Weakness Duration: 2 Days Timing:  Acute Location: Generalized Quality: \"just don't feel well\" Severity: Moderate Modifying Factors: none reported Associated Symptoms: melena, hematemesis Additional History (Context): Robbi Matamoros is a 63 y.o. male with hypertension who presents with acute, moderate generalized weakness for 2 days. Pt states he \"doesn't feel good\". Associated sx include melena today after being constipated for 2 days prior, vomiting all day yesterday (clear with coffee ground appearance). Pt uses THC. Pt is on ASA and Plavix. PCP: Joleen Melchor MD 
 
Current Facility-Administered Medications Medication Dose Route Frequency Provider Last Rate Last Dose  pantoprazole (PROTONIX) 40 mg in 0.9% sodium chloride (MBP/ADV) 50 mL MBP  8 mg/hr IntraVENous CONTINUOUS Mere Ibarra MD 10 mL/hr at 01/02/19 1302 8 mg/hr at 01/02/19 1302  potassium chloride 10 mEq in 100 ml IVPB  10 mEq IntraVENous Q1H Mere Ibarra  mL/hr at 01/02/19 1302 10 mEq at 01/02/19 1302  dilTIAZem (CARDIZEM) 125 mg in 0.9% sodium chloride 125 mL infusion  0-15 mg/hr IntraVENous TITRATE Mere Ibarra MD 15 mL/hr at 01/02/19 1239 15 mg/hr at 01/02/19 1239 Current Outpatient Medications Medication Sig Dispense Refill  
 HYDROcodone-acetaminophen (NORCO) 5-325 mg per tablet Take 1 Tab by mouth every six (6) hours as needed for Pain. Max Daily Amount: 4 Tabs. 12 Tab 0  
 potassium chloride SR (KLOR-CON 10) 10 mEq tablet Take 2 Tabs by mouth daily. Indications: hypokalemia 60 Tab 5  
 nicotine (NICODERM CQ) 21 mg/24 hr 1 Patch.  aspirin (ASPIRIN) 325 mg tablet 325 mg.  atorvastatin (LIPITOR) 40 mg tablet 40 mg.    
 clopidogrel (PLAVIX) 75 mg tab 75 mg.    
 naloxone (NARCAN) 4 mg/actuation nasal spray 4 mg.  morphine CR (MS CONTIN) 15 mg CR tablet 15 mg.    
 venlafaxine-SR (EFFEXOR-XR) 150 mg capsule Take 1 Cap by mouth daily. Indications: ANXIETY WITH DEPRESSION 90 Cap 3  
 gabapentin (NEURONTIN) 100 mg capsule Take 1 Cap by mouth three (3) times daily. 90 Cap 2  
 oxyCODONE-acetaminophen (PERCOCET 10)  mg per tablet Take 1 Tab by mouth every eight (8) hours as needed for Pain. Max Daily Amount: 3 Tabs. 90 Tab 0  
 therapeutic multivitamin-minerals (THERAGRAN-M) tablet Take 1 Tab by Mouth Once a Day.  amLODIPine (NORVASC) 10 mg tablet Take 1 Tab by mouth daily. Indications: hypertension 90 Tab 3  
 melatonin 3 mg tablet Take  by mouth.  polyethylene glycol (MIRALAX) 17 gram packet Take 17 g by mouth daily.  (No Medication Selected) 1 Tab two (2) times a day. Morphine sulfate  15 mg Past History Past Medical History: 
Past Medical History:  
Diagnosis Date  Calculus of kidney  Depression  Hypertension  Sleep disorder  Vascular disorder of lower extremity   
 bilateral  
 
 
Past Surgical History: 
Past Surgical History:  
Procedure Laterality Date  HX AMPUTATION TOE Left   
 x 5 toes  HX HEENT  VASCULAR SURGERY PROCEDURE UNLIST Family History: 
Family History Problem Relation Age of Onset [place] Arthritis-osteo Mother  Diabetes Father  Heart Disease Father  Melanoma Father  Psychiatric Disorder Brother Social History: 
Social History Tobacco Use  Smoking status: Current Every Day Smoker Packs/day: 0.25  Smokeless tobacco: Never Used Substance Use Topics  Alcohol use: Yes  Drug use: Yes Frequency: 1.0 times per week Types: Marijuana Comment: rarely Allergies: 
No Known Allergies Review of Systems Review of Systems Constitutional:  
     + ill-feeling Cardiovascular: Positive for palpitations. Negative for chest pain. Gastrointestinal: Positive for blood in stool and vomiting.  
     + hematemesis Musculoskeletal: Positive for neck pain.  
     + foot pain Neurological: Positive for weakness (general). All other systems reviewed and are negative. Physical Exam  
 
Visit Vitals /78 Pulse (!) 114 Temp 99.8 °F (37.7 °C) Resp 19 Ht 5' 8\" (1.727 m) Wt 59 kg (130 lb) SpO2 100% BMI 19.77 kg/m² Physical Exam  
Constitutional: He is oriented to person, place, and time. He appears well-developed and well-nourished. No distress. HENT:  
Head: Normocephalic and atraumatic. Mouth/Throat: Oropharynx is clear and moist. Mucous membranes are dry. Eyes: Conjunctivae and EOM are normal. Pupils are equal, round, and reactive to light. No scleral icterus. Neck: Normal range of motion. Neck supple. Cardiovascular: Normal heart sounds. An irregularly irregular rhythm present. Tachycardia present. No murmur heard. Pulmonary/Chest: Effort normal and breath sounds normal. No respiratory distress. Abdominal: Soft. Bowel sounds are normal. He exhibits no distension. There is no tenderness. Genitourinary:  
Genitourinary Comments: per nursing staff rectal exam showed black stool, guaiac positive Musculoskeletal: He exhibits no edema. Amputation L mid foot Lymphadenopathy:  
  He has no cervical adenopathy. Neurological: He is alert and oriented to person, place, and time. Coordination normal.  
Skin: Skin is warm and dry. No rash noted. There is pallor. Psychiatric: He has a normal mood and affect. His behavior is normal.  
Nursing note and vitals reviewed. Diagnostic Study Results Labs - Recent Results (from the past 12 hour(s)) EKG, 12 LEAD, INITIAL Collection Time: 01/02/19  9:35 AM  
Result Value Ref Range  Ventricular Rate 138 BPM  
 Atrial Rate 131 BPM  
 QRS Duration 96 ms  
 Q-T Interval 282 ms QTC Calculation (Bezet) 427 ms Calculated R Axis 48 degrees Calculated T Axis 90 degrees Diagnosis Atrial fibrillation with rapid ventricular response Nonspecific ST and T wave abnormality Abnormal ECG When compared with ECG of 05-JUN-2018 17:30, Atrial fibrillation has replaced Sinus rhythm Vent. rate has increased BY  46 BPM 
ST now depressed in Inferior leads ST now depressed in Lateral leads Confirmed by Rock Sage ([id number]) on 1/2/2019 11:32:47 AM 
  
CBC WITH AUTOMATED DIFF Collection Time: 01/02/19 10:00 AM  
Result Value Ref Range WBC 16.9 (H) 4.6 - 13.2 K/uL  
 RBC 2.60 (L) 4.70 - 5.50 M/uL HGB 8.1 (L) 13.0 - 16.0 g/dL HCT 23.9 (L) 36.0 - 48.0 % MCV 91.9 74.0 - 97.0 FL  
 MCH 31.2 24.0 - 34.0 PG  
 MCHC 33.9 31.0 - 37.0 g/dL  
 RDW 13.3 11.6 - 14.5 % PLATELET 535 281 - 857 K/uL MPV 9.8 9.2 - 11.8 FL  
 NEUTROPHILS 88 (H) 40 - 73 % LYMPHOCYTES 6 (L) 21 - 52 % MONOCYTES 6 3 - 10 % EOSINOPHILS 0 0 - 5 % BASOPHILS 0 0 - 2 %  
 ABS. NEUTROPHILS 14.7 (H) 1.8 - 8.0 K/UL  
 ABS. LYMPHOCYTES 1.0 0.9 - 3.6 K/UL  
 ABS. MONOCYTES 1.0 0.05 - 1.2 K/UL  
 ABS. EOSINOPHILS 0.1 0.0 - 0.4 K/UL  
 ABS. BASOPHILS 0.1 0.0 - 0.1 K/UL  
 DF AUTOMATED    
TYPE & SCREEN Collection Time: 01/02/19 10:00 AM  
Result Value Ref Range Crossmatch Expiration 01/05/2019 ABO/Rh(D) A POSITIVE Antibody screen NEG PROTHROMBIN TIME + INR Collection Time: 01/02/19 10:00 AM  
Result Value Ref Range Prothrombin time 20.1 (H) 11.5 - 15.2 sec INR 1.7 (H) 0.8 - 1.2 METABOLIC PANEL, COMPREHENSIVE Collection Time: 01/02/19 10:00 AM  
Result Value Ref Range Sodium 148 (H) 136 - 145 mmol/L Potassium 2.4 (LL) 3.5 - 5.5 mmol/L Chloride 120 (H) 100 - 108 mmol/L  
 CO2 15 (L) 21 - 32 mmol/L Anion gap 13 3.0 - 18 mmol/L Glucose 129 (H) 74 - 99 mg/dL  BUN 39 (H) 7.0 - 18 MG/DL  
 Creatinine 0.93 0.6 - 1.3 MG/DL  
 BUN/Creatinine ratio 42 (H) 12 - 20 GFR est AA >60 >60 ml/min/1.73m2 GFR est non-AA >60 >60 ml/min/1.73m2 Calcium 5.4 (LL) 8.5 - 10.1 MG/DL Bilirubin, total 0.4 0.2 - 1.0 MG/DL  
 ALT (SGPT) 16 16 - 61 U/L  
 AST (SGOT) 19 15 - 37 U/L Alk. phosphatase 74 45 - 117 U/L Protein, total 4.0 (L) 6.4 - 8.2 g/dL Albumin 2.0 (L) 3.4 - 5.0 g/dL Globulin 2.0 2.0 - 4.0 g/dL A-G Ratio 1.0 0.8 - 1.7 MAGNESIUM Collection Time: 01/02/19 10:00 AM  
Result Value Ref Range Magnesium 1.6 1.6 - 2.6 mg/dL PTT Collection Time: 01/02/19 10:00 AM  
Result Value Ref Range aPTT 35.3 23.0 - 36.4 SEC CARDIAC PANEL,(CK, CKMB & TROPONIN) Collection Time: 01/02/19 10:00 AM  
Result Value Ref Range CK 20 (L) 39 - 308 U/L  
 CK - MB <1.0 <3.6 ng/ml CK-MB Index  0.0 - 4.0 % CALCULATION NOT PERFORMED WHEN RESULT IS BELOW LINEAR LIMIT Troponin-I, QT 0.05 (H) 0.0 - 0.045 NG/ML  
POC CHEM8 Collection Time: 01/02/19 10:17 AM  
Result Value Ref Range CO2, POC 14 (L) 19 - 24 MMOL/L Glucose,  (H) 74 - 106 MG/DL  
 BUN, POC 33 (H) 7 - 18 MG/DL Creatinine, POC 0.9 0.6 - 1.3 MG/DL  
 GFRAA, POC >60 >60 ml/min/1.73m2 GFRNA, POC >60 >60 ml/min/1.73m2 Sodium,  (H) 136 - 145 MMOL/L Potassium, POC 2.2 (LL) 3.5 - 5.5 MMOL/L Calcium, ionized (POC) 0.88 (L) 1.12 - 1.32 mmol/L Chloride,  (H) 100 - 108 MMOL/L Anion gap, POC 24 (H) 10 - 20 Hematocrit, POC 26 (L) 36 - 49 % Hemoglobin, POC 8.8 (L) 12 - 16 G/DL URINALYSIS W/ RFLX MICROSCOPIC Collection Time: 01/02/19 11:50 AM  
Result Value Ref Range Color YELLOW Appearance CLEAR Specific gravity 1.019 1.005 - 1.030    
 pH (UA) 5.5 5.0 - 8.0 Protein TRACE (A) NEG mg/dL Glucose NEGATIVE  NEG mg/dL Ketone TRACE (A) NEG mg/dL Bilirubin NEGATIVE  NEG Blood NEGATIVE  NEG Urobilinogen 0.2 0.2 - 1.0 EU/dL Nitrites NEGATIVE  NEG Leukocyte Esterase TRACE (A) NEG URINE MICROSCOPIC ONLY Collection Time: 01/02/19 11:50 AM  
Result Value Ref Range WBC 2 to 5 0 - 4 /hpf  
 RBC 0 0 - 5 /hpf Epithelial cells 1+ 0 - 5 /lpf Bacteria 2+ (A) NEG /hpf Mucus 2+ (A) NEG /lpf Hyaline cast 2 to 4 0 - 2 /lpf Radiologic Studies -  
XR CHEST PORT    (Results Pending) No results found. Medical Decision Making I am the first provider for this patient. I reviewed the vital signs, available nursing notes, past medical history, past surgical history, family history and social history. Vital Signs-Reviewed the patient's vital signs. Pulse Oximetry Analysis -  99% on room air (Interpretation) wnl 
 
Cardiac Monitor: 
Rate: 161 EKG: Interpreted by the EP. Time Interpreted: 7714 Rate: 138 Rhythm: Atrial Fibrillation Interpretation: nonspecific ST-T wave changes Records Reviewed: Nursing Notes (Time of Review: 9:50 AM) ED Course: Progress Notes, Reevaluation, and Consults: 
 
09:48 Pt just transiently converted to sinus rhythm on cardiac monitor, then back to a-fib Indication: unable to get IV access/blood Skin Preparation: Hand hygiene performed prior to venous catheter insertion. The area was cleansed and prepped with alcohol. Procedure: 20 gauge IV placed in L AC location. Vascular probe used in live manner. Vein identified. 1 attempts. IV secured. Good venous flow. No complication. Assessment: Good patency and blood return. Post-procedure: Patient tolerated the procedure well with no immediate complications. Performed by Cecil Zavala MD 10:17  
 
10:37 Pt is receiving: Cardizem 15mg, IV fluids, Protonix bolus and infusion, Potassium x4, Magnesium. 10:46 Placed IV access in EJ. 
 
10:47 HR down in one-teens, BP up, feels improved. 10:59 Pt converted to NSR but returned to a-fib. 12:08 Consult:  Discussed care with Dr. Angelito Keller (Hospitalist). Standard discussion; including history of patients chief complaint, available diagnostic results, and treatment course. Accepts for admission. 12:15 Consult:  Discussed care with Dr. Kendra Phillips (Intensivist). Standard discussion; including history of patients chief complaint, available diagnostic results, and treatment course. Will consult. Provider Notes (Medical Decision Making): MDM Number of Diagnoses or Management Options Acute upper GI bleed:  
Atrial fibrillation with RVR ([facility] [address]):  
Hypocalcemia:  
Hypokalemia:  
Diagnosis management comments: Initial c/o feeling bad with n/v ? Coffee ground emesis with black guaiac + stool in ED pt in a fib with RVR borderline BP initially. Noted to have significant electrolyte disorders K+ replaced mag bolus given, calcium given Fluids and protonix bolus and infusion given Diltiazem and digoxin given for HR control Bp and HR improved after fluids and medication Amount and/or Complexity of Data Reviewed Clinical lab tests: ordered and reviewed Tests in the radiology section of CPT®: ordered and reviewed Risk of Complications, Morbidity, and/or Mortality Presenting problems: high Diagnostic procedures: high Management options: high Critical Care Time: The services I provided to this patient were to treat and/or prevent clinically significant deterioration that could result in the failure of one or more body systems and/or organ systems due to GI bleed and a-fib with RVR. Services included the following: 
-reviewing nursing notes and old charts 
-vital sign assessments 
-direct patient care 
-medication orders and management 
-interpreting and reviewing diagnostic studies/labs 
-re-evaluations 
-documentation time Aggregate critical care time was 75 minutes, which includes only time during which I was engaged in work directly related to the patient's care as described above, whether I was at bedside or elsewhere in the Emergency Department. It did not include time spent performing other reported procedures or the services of residents, students, nurses, or advance practice providers. Magdaleno Malik MD 11:20 For Hospitalized Patients: 
 
1. Hospitalization Decision Time: The decision to hospitalize the patient was made by Dr. Daryl Mary at 1200 on 1/2/2019 2. Aspirin: Aspirin was not given because the patient did not present with a stroke at the time of their Emergency Department evaluation Diagnosis Clinical Impression: 1. Atrial fibrillation with RVR ([address] 2. Acute upper GI bleed 3. Hypokalemia 4. Hypocalcemia Disposition: Admit Medication List  
  
ASK your doctor about these medications   
amLODIPine 10 mg tablet Commonly known as:  Elspeth Bakes Take 1 Tab by mouth daily. Indications: hypertension 
  
aspirin 325 mg tablet Commonly known as:  ASPIRIN 
  
atorvastatin 40 mg tablet Commonly known as:  LIPITOR 
  
clopidogrel 75 mg Tab Commonly known as:  PLAVIX 
  
gabapentin 100 mg capsule Commonly known as:  NEURONTIN Take 1 Cap by mouth three (3) times daily. HYDROcodone-acetaminophen 5-325 mg per tablet Commonly known as:  March Castles Take 1 Tab by mouth every six (6) hours as needed for Pain. Max Daily Amount: 4 Tabs. melatonin 3 mg tablet MIRALAX 17 gram packet Generic drug:  polyethylene glycol MORPHINE 
  
morphine CR 15 mg CR tablet Commonly known as:  MS CONTIN 
  
naloxone 4 mg/actuation nasal spray Commonly known as:  NARCAN 
  
nicotine 21 mg/24 hr 
Commonly known as:  NICODERM CQ 
  
oxyCODONE-acetaminophen  mg per tablet Commonly known as:  PERCOCET 10 Take 1 Tab by mouth every eight (8) hours as needed for Pain. Max Daily Amount: 3 Tabs. potassium chloride SR 10 mEq tablet Commonly known as:  KLOR-CON 10 Take 2 Tabs by mouth daily. Indications: hypokalemia therapeutic multivitamin-minerals tablet Commonly known as:  THERAGRAN-M 
  
venlafaxine- mg capsule Commonly known as:  EFFEXOR-XR Take 1 Cap by mouth daily. Indications: ANXIETY WITH DEPRESSION 
  
  
 
_______________________________ Attestations: 
Scribe Attestation Addy Seay acting as a scribe for and in the presence of Miguel Angel Berman MD     
January 02, 2019 at 9:50 AM 
    
Provider Attestation:     
I personally performed the services described in the documentation, reviewed the documentation, as recorded by the scribe in my presence, and it accurately and completely records my words and actions. January 02, 2019 at 9:50 AM - Miguel Angel Berman MD   
_______________________________

## 2019-01-02 NOTE — H&P
History and Physical 
 
Patient: Marvie Soulier               Sex: male          DOA: 1/2/2019 YOB: 1960      Age:  63 y.o.        LOS:  LOS: 0 days HPI:  
 
Marvie Soulier is a 63 y.o. male with history of PAD s/p lower extremity stent (3/2018) who presents due to weakness. Patient said yesterday he was all up all night vomiting dark brown coffee ground emesis. He denied dark stools. He said today he felt very weak and could not walk. He presented to the ED for evaluation. In the ED he was found to be in AF with RVR. Labs notable for severe electrolyte depletion. Hemoglobin 8.1 from baseline 11. Stool guaiac was positive. Pt denies h/o AF. Pt denies h/o CAD or stents. Pt was started on cardizem for rate control and protonix IV infusion for UGIB. He was admitted to ICU for further management. Past Medical History:  
Diagnosis Date  Calculus of kidney  Depression  Hypertension  Sleep disorder  Vascular disorder of lower extremity   
 bilateral  
 
 
Social History Tobacco Use  Smoking status: Current Every Day Smoker Packs/day: 0.25  Smokeless tobacco: Never Used Substance Use Topics  Alcohol use: Yes Family History:  
Family History Problem Relation Age of Onset [place] Arthritis-osteo Mother  Diabetes Father  Heart Disease Father  Melanoma Father  Psychiatric Disorder Brother Review of Systems Constitutional:  No fever or weight loss. +generalized weakness HEENT:  No headache or visual changes Cardiovascular:  No chest pain or diaphoresis Respiratory:  No coughing, wheezing, or shortness of breath. GI:  +Nausea, +vomiting, +coffee ground emesis :  No hematuria or dysuria Skin:  No rashes or moles Neuro:  No seizures or syncope Hematological:  No bruising or bleeding Endocrine:  No diabetes or thyroid disease Physical Exam:  
  
Visit Vitals /73 (BP 1 Location: Left arm, BP Patient Position: At rest;Head of bed elevated (Comment degrees)) Pulse 97 Temp 99.9 °F (37.7 °C) Resp 20 Ht 6' 2\" (1.88 m) Wt 59 kg (130 lb) SpO2 98% BMI 16.69 kg/m² Physical Exam: 
 
Gen:  No distress, alert HEENT:  Normal cephalic atraumatic, extra-occular movements are intact. Neck:  Supple, No JVD Lungs:  Clear bilaterally, no wheeze, no rales, normal effort Heart:  Irregularly irregular rhythm, normal S1 and S2, no edema Abdomen:  Soft, non tender, normal bowel sounds, no guarding. Extremities:  Well perfused, no cyanosis or edema. Left foot wrapped in ACE wrap Neurological:  Awake and alert, CN's are intact, normal strength throughout extremities Skin:  No rashes or moles Mental Status:  Normal thought process, does not appear anxious Laboratory Studies: All lab results for the last 24 hours reviewed. Assessment/Plan Active Problems: 
  A-fib ([facility] [address] (1/2/2019) GI bleed (1/2/2019) Hypocalcemia (1/2/2019) Hypokalemia (1/2/2019) PAD (peripheral artery disease) ([facility] [address] (1/2/2019) Acute blood loss anemia (1/2/2019) PLAN: 
1. Replace electrolytes per ICU protocol 2. IV protonix for UGIB 3. GI consulted, NPO at midnight for EGD in AM 
4. Cont cardizem gtt for rate control, Cardiology consulted 5. Echo ordered 6. D/w Cardiology, OK to hold plavix for EGD 7. DVT prophylaxis - hold chemoprophylaxis given UGIB. SCDs ordered

## 2019-01-02 NOTE — ED NOTES
Patient resting at this time, Dr Troy Salazar aware of heart rate still at 160's, blood pressure on low side of normal

## 2019-01-02 NOTE — ED NOTES
TRANSFER - ED to INPATIENT REPORT: 
 
SBAR report made available to receiving floor on this patient being transferred to   [phone] /[id number])  for routine progression of care Admitting diagnosis GI bleed A-fib ([facility] [address] Information from the following report(s) ED Summary, Intake/Output and MAR was made available to receiving floor. Lines:  
Peripheral IV 01/02/19 Left Hand (Active) Site Assessment Clean, dry, & intact 1/2/2019  9:42 AM  
Phlebitis Assessment 0 1/2/2019  9:42 AM  
Infiltration Assessment 0 1/2/2019  9:42 AM  
Dressing Status Clean, dry, & intact 1/2/2019  9:42 AM  
Dressing Type Transparent 1/2/2019  9:42 AM  
Hub Color/Line Status Pink;Flushed;Patent 1/2/2019  9:42 AM  
Action Taken Blood drawn 1/2/2019  9:42 AM  
   
Peripheral IV 01/02/19 Right Hand (Active) Site Assessment Clean, dry, & intact 1/2/2019  9:30 AM  
Phlebitis Assessment 0 1/2/2019  9:30 AM  
Infiltration Assessment 0 1/2/2019  9:30 AM  
Dressing Status Clean, dry, & intact 1/2/2019  9:30 AM  
Dressing Type Transparent 1/2/2019  9:30 AM  
   
Peripheral IV 01/02/19 Left External jugular (Active) Site Assessment Clean, dry, & intact 1/2/2019 10:00 AM  
Phlebitis Assessment 0 1/2/2019 10:00 AM  
Infiltration Assessment 0 1/2/2019 10:00 AM  
Dressing Status Clean, dry, & intact 1/2/2019 10:00 AM  
Dressing Type Transparent 1/2/2019 10:00 AM  
  
 
Medication list updated today Opportunity for questions and clarification was provided. Patient is oriented to time, place, person and situation High alert med  
-Cardizem qtt at 15mg/ hr 
-Potassium 10meq/ hr 
-Protonix 8mg/hr Patient is  continent and ambulatory without assist 
  
Valuables transported with patient Patient transported with: 
 Monitor Registered Nurse Tech Vitals w/ MEWS Score (last day) Date/Time MEWS Score Pulse Resp Temp BP Level of Consciousness SpO2  
 01/02/19 1300  4  126  (Abnormal)   22  98.9 °F (37.2 °C)  112/67  Alert  98 % 01/02/19 1245    114  (Abnormal)   19    118/78    100 % 01/02/19 1230    167  (Abnormal)   18    113/63    98 % 01/02/19 1216    133  (Abnormal)       109/57      
 01/02/19 1215    109  (Abnormal)   23    104/59    97 % 01/02/19 1200    118  (Abnormal)   21    109/57    98 % 01/02/19 1145    144  (Abnormal)   22    93/68    99 % 01/02/19 1130    164  (Abnormal)   17    100/81    100 % 01/02/19 1115    130  (Abnormal)   20    117/69    99 % 01/02/19 1100    142  (Abnormal)   17    113/64    99 % 01/02/19 1045    104  (Abnormal)   17    113/82    98 % 01/02/19 1030    112  (Abnormal)   20    127/67    99 % 01/02/19 1015    113  (Abnormal)   21    113/80      
 01/02/19 1005    166  (Abnormal)       106/88      
 01/02/19 1000    165  (Abnormal)   19    106/88    99 % 01/02/19 0930  5  161  (Abnormal)   16  99.8 °F (37.7 °C)  91/68  Alert  99 %
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
## 2019-01-02 NOTE — PROGRESS NOTES
Physical Exam  
Constitutional: He is oriented to person, place, and time. Neurological: He is alert and oriented to person, place, and time. Skin: Skin is warm and dry. No bruising and no rash noted. There is pallor.

## 2019-01-03 ENCOUNTER — APPOINTMENT (OUTPATIENT)
Dept: NON INVASIVE DIAGNOSTICS | Age: 59
DRG: 229 | End: 2019-01-03
Attending: HOSPITALIST
Payer: MEDICAID

## 2019-01-03 ENCOUNTER — ANESTHESIA EVENT (OUTPATIENT)
Dept: ENDOSCOPY | Age: 59
DRG: 229 | End: 2019-01-03
Payer: MEDICAID

## 2019-01-03 ENCOUNTER — ANESTHESIA (OUTPATIENT)
Dept: ENDOSCOPY | Age: 59
DRG: 229 | End: 2019-01-03
Payer: MEDICAID

## 2019-01-03 LAB
ALBUMIN SERPL-MCNC: 2.8 G/DL (ref 3.4–5)
ALBUMIN/GLOB SERPL: 1.1 {RATIO} (ref 0.8–1.7)
ALP SERPL-CCNC: 85 U/L (ref 45–117)
ALT SERPL-CCNC: 26 U/L (ref 16–61)
ANION GAP SERPL CALC-SCNC: 6 MMOL/L (ref 3–18)
AST SERPL-CCNC: 17 U/L (ref 15–37)
BASOPHILS # BLD: 0.2 K/UL (ref 0–0.1)
BASOPHILS NFR BLD: 1 % (ref 0–2)
BILIRUB SERPL-MCNC: 0.3 MG/DL (ref 0.2–1)
BUN SERPL-MCNC: 38 MG/DL (ref 7–18)
BUN/CREAT SERPL: 48 (ref 12–20)
CALCIUM SERPL-MCNC: 8 MG/DL (ref 8.5–10.1)
CHLORIDE SERPL-SCNC: 113 MMOL/L (ref 100–108)
CO2 SERPL-SCNC: 24 MMOL/L (ref 21–32)
CREAT SERPL-MCNC: 0.79 MG/DL (ref 0.6–1.3)
DIFFERENTIAL METHOD BLD: ABNORMAL
ECHO AO ROOT DIAM: 3.32 CM
ECHO AV PEAK GRADIENT: 0 MMHG
ECHO AV PEAK VELOCITY: 0 CM/S
ECHO AV REGURGITANT PHT: 659.4 CM
ECHO LA VOL 2C: 46.26 ML (ref 18–58)
ECHO LA VOL 4C: 54.18 ML (ref 18–58)
ECHO LA VOLUME INDEX A2C: 25.56 ML/M2 (ref 16–28)
ECHO LA VOLUME INDEX A4C: 29.94 ML/M2 (ref 16–28)
ECHO LV EDV A2C: 106.2 ML
ECHO LV EDV A4C: 192.6 ML
ECHO LV EDV BP: 143.1 ML (ref 67–155)
ECHO LV EDV INDEX A4C: 106.4 ML/M2
ECHO LV EDV INDEX BP: 79.1 ML/M2
ECHO LV EDV NDEX A2C: 58.7 ML/M2
ECHO LV EJECTION FRACTION A2C: 59 %
ECHO LV EJECTION FRACTION A4C: 85 %
ECHO LV EJECTION FRACTION BIPLANE: 75.3 % (ref 55–100)
ECHO LV ESV A2C: 43.2 ML
ECHO LV ESV A4C: 29.3 ML
ECHO LV ESV BP: 35.4 ML (ref 22–58)
ECHO LV ESV INDEX A2C: 23.9 ML/M2
ECHO LV ESV INDEX A4C: 16.2 ML/M2
ECHO LV ESV INDEX BP: 19.6 ML/M2
ECHO LV INTERNAL DIMENSION DIASTOLIC: 4.37 CM (ref 4.2–5.9)
ECHO LV INTERNAL DIMENSION SYSTOLIC: 3.03 CM
ECHO LV IVSD: 1.13 CM (ref 0.6–1)
ECHO LV MASS 2D: 174.4 G (ref 88–224)
ECHO LV MASS INDEX 2D: 96.4 G/M2 (ref 49–115)
ECHO LV POSTERIOR WALL DIASTOLIC: 0.91 CM (ref 0.6–1)
ECHO LVOT DIAM: 2.14 CM
ECHO LVOT PEAK GRADIENT: 5.4 MMHG
ECHO LVOT PEAK VELOCITY: 116.69 CM/S
ECHO MV A VELOCITY: 76.72 CM/S
ECHO MV AREA PHT: 3.7 CM2
ECHO MV E DECELERATION TIME (DT): 204.8 MS
ECHO MV E VELOCITY: 0.57 CM/S
ECHO MV E/A RATIO: 0.01
ECHO MV PRESSURE HALF TIME (PHT): 59.4 MS
EOSINOPHIL # BLD: 0.9 K/UL (ref 0–0.4)
EOSINOPHIL NFR BLD: 6 % (ref 0–5)
ERYTHROCYTE [DISTWIDTH] IN BLOOD BY AUTOMATED COUNT: 13.5 % (ref 11.6–14.5)
FERRITIN SERPL-MCNC: 151 NG/ML (ref 8–388)
GLOBULIN SER CALC-MCNC: 2.5 G/DL (ref 2–4)
GLUCOSE SERPL-MCNC: 117 MG/DL (ref 74–99)
HCT VFR BLD AUTO: 24.5 % (ref 36–48)
HGB BLD-MCNC: 8.3 G/DL (ref 13–16)
IRON SERPL-MCNC: 112 UG/DL (ref 50–175)
LYMPHOCYTES # BLD: 1.7 K/UL (ref 0.9–3.6)
LYMPHOCYTES NFR BLD: 11 % (ref 21–52)
MAGNESIUM SERPL-MCNC: 2.3 MG/DL (ref 1.6–2.6)
MCH RBC QN AUTO: 31 PG (ref 24–34)
MCHC RBC AUTO-ENTMCNC: 33.9 G/DL (ref 31–37)
MCV RBC AUTO: 91.4 FL (ref 74–97)
MONOCYTES # BLD: 0.9 K/UL (ref 0.05–1.2)
MONOCYTES NFR BLD: 6 % (ref 3–10)
NEUTS SEG # BLD: 11.4 K/UL (ref 1.8–8)
NEUTS SEG NFR BLD: 76 % (ref 40–73)
PISA AR MAX VEL: 272.01 CM/S
PLATELET # BLD AUTO: 265 K/UL (ref 135–420)
PMV BLD AUTO: 9.8 FL (ref 9.2–11.8)
POTASSIUM SERPL-SCNC: 3.9 MMOL/L (ref 3.5–5.5)
PROT SERPL-MCNC: 5.3 G/DL (ref 6.4–8.2)
RBC # BLD AUTO: 2.68 M/UL (ref 4.7–5.5)
SODIUM SERPL-SCNC: 143 MMOL/L (ref 136–145)
TIBC SERPL-MCNC: 218 UG/DL (ref 250–450)
VIT B12 SERPL-MCNC: 252 PG/ML (ref 211–911)
WBC # BLD AUTO: 15.1 K/UL (ref 4.6–13.2)

## 2019-01-03 PROCEDURE — 88305 TISSUE EXAM BY PATHOLOGIST: CPT

## 2019-01-03 PROCEDURE — 83735 ASSAY OF MAGNESIUM: CPT

## 2019-01-03 PROCEDURE — C9113 INJ PANTOPRAZOLE SODIUM, VIA: HCPCS | Performed by: EMERGENCY MEDICINE

## 2019-01-03 PROCEDURE — 93306 TTE W/DOPPLER COMPLETE: CPT

## 2019-01-03 PROCEDURE — 74011000250 HC RX REV CODE- 250: Performed by: EMERGENCY MEDICINE

## 2019-01-03 PROCEDURE — 76060000031 HC ANESTHESIA FIRST 0.5 HR: Performed by: INTERNAL MEDICINE

## 2019-01-03 PROCEDURE — 74011250636 HC RX REV CODE- 250/636

## 2019-01-03 PROCEDURE — 74011250637 HC RX REV CODE- 250/637: Performed by: HOSPITALIST

## 2019-01-03 PROCEDURE — 77030021352 HC CBL LD SYS DISP COVD -B

## 2019-01-03 PROCEDURE — 65660000000 HC RM CCU STEPDOWN

## 2019-01-03 PROCEDURE — 85025 COMPLETE CBC W/AUTO DIFF WBC: CPT

## 2019-01-03 PROCEDURE — 74011250636 HC RX REV CODE- 250/636: Performed by: HOSPITALIST

## 2019-01-03 PROCEDURE — 0DB28ZX EXCISION OF MIDDLE ESOPHAGUS, VIA NATURAL OR ARTIFICIAL OPENING ENDOSCOPIC, DIAGNOSTIC: ICD-10-PCS | Performed by: INTERNAL MEDICINE

## 2019-01-03 PROCEDURE — 36592 COLLECT BLOOD FROM PICC: CPT

## 2019-01-03 PROCEDURE — 76040000019: Performed by: INTERNAL MEDICINE

## 2019-01-03 PROCEDURE — 74011250637 HC RX REV CODE- 250/637: Performed by: NURSE PRACTITIONER

## 2019-01-03 PROCEDURE — 80053 COMPREHEN METABOLIC PANEL: CPT

## 2019-01-03 PROCEDURE — 74011000258 HC RX REV CODE- 258: Performed by: EMERGENCY MEDICINE

## 2019-01-03 PROCEDURE — 74011250636 HC RX REV CODE- 250/636: Performed by: EMERGENCY MEDICINE

## 2019-01-03 RX ORDER — POTASSIUM CHLORIDE 7.45 MG/ML
INJECTION INTRAVENOUS
Status: COMPLETED
Start: 2019-01-03 | End: 2019-01-03

## 2019-01-03 RX ORDER — VENLAFAXINE 37.5 MG/1
150 TABLET ORAL 2 TIMES DAILY WITH MEALS
Status: DISCONTINUED | OUTPATIENT
Start: 2019-01-03 | End: 2019-01-03

## 2019-01-03 RX ORDER — SODIUM CHLORIDE 9 MG/ML
100 INJECTION, SOLUTION INTRAVENOUS CONTINUOUS
Status: DISCONTINUED | OUTPATIENT
Start: 2019-01-03 | End: 2019-01-03

## 2019-01-03 RX ORDER — POTASSIUM CHLORIDE 7.45 MG/ML
10 INJECTION INTRAVENOUS ONCE
Status: COMPLETED | OUTPATIENT
Start: 2019-01-03 | End: 2019-01-03

## 2019-01-03 RX ORDER — VENLAFAXINE 50 MG/1
150 TABLET ORAL 2 TIMES DAILY WITH MEALS
Status: DISCONTINUED | OUTPATIENT
Start: 2019-01-03 | End: 2019-01-03

## 2019-01-03 RX ORDER — GABAPENTIN 400 MG/1
1200 CAPSULE ORAL 3 TIMES DAILY
Status: DISCONTINUED | OUTPATIENT
Start: 2019-01-03 | End: 2019-01-11 | Stop reason: HOSPADM

## 2019-01-03 RX ORDER — FENTANYL CITRATE 50 UG/ML
50-200 INJECTION, SOLUTION INTRAMUSCULAR; INTRAVENOUS
Status: DISCONTINUED | OUTPATIENT
Start: 2019-01-03 | End: 2019-01-03

## 2019-01-03 RX ORDER — PROPOFOL 10 MG/ML
INJECTION, EMULSION INTRAVENOUS AS NEEDED
Status: DISCONTINUED | OUTPATIENT
Start: 2019-01-03 | End: 2019-01-03 | Stop reason: HOSPADM

## 2019-01-03 RX ORDER — MIDAZOLAM HYDROCHLORIDE 1 MG/ML
.5-1 INJECTION, SOLUTION INTRAMUSCULAR; INTRAVENOUS
Status: DISCONTINUED | OUTPATIENT
Start: 2019-01-03 | End: 2019-01-03

## 2019-01-03 RX ORDER — VENLAFAXINE 50 MG/1
50 TABLET ORAL
Status: DISCONTINUED | OUTPATIENT
Start: 2019-01-03 | End: 2019-01-03

## 2019-01-03 RX ORDER — VENLAFAXINE HYDROCHLORIDE 150 MG/1
150 CAPSULE, EXTENDED RELEASE ORAL
Status: DISCONTINUED | OUTPATIENT
Start: 2019-01-04 | End: 2019-01-11 | Stop reason: HOSPADM

## 2019-01-03 RX ADMIN — POTASSIUM CHLORIDE 10 MEQ: 7.45 INJECTION INTRAVENOUS at 06:18

## 2019-01-03 RX ADMIN — Medication 10 ML: at 13:41

## 2019-01-03 RX ADMIN — SODIUM CHLORIDE 75 ML/HR: 900 INJECTION, SOLUTION INTRAVENOUS at 18:39

## 2019-01-03 RX ADMIN — SODIUM CHLORIDE 8 MG/HR: 900 INJECTION INTRAVENOUS at 18:39

## 2019-01-03 RX ADMIN — Medication 10 ML: at 06:23

## 2019-01-03 RX ADMIN — VENLAFAXINE 150 MG: 37.5 TABLET ORAL at 13:52

## 2019-01-03 RX ADMIN — Medication 20 ML: at 21:23

## 2019-01-03 RX ADMIN — SODIUM CHLORIDE 8 MG/HR: 900 INJECTION INTRAVENOUS at 06:26

## 2019-01-03 RX ADMIN — POTASSIUM CHLORIDE 10 MEQ: 7.46 INJECTION, SOLUTION INTRAVENOUS at 06:18

## 2019-01-03 RX ADMIN — GABAPENTIN 1200 MG: 400 CAPSULE ORAL at 15:01

## 2019-01-03 RX ADMIN — SODIUM CHLORIDE 75 ML/HR: 900 INJECTION, SOLUTION INTRAVENOUS at 06:18

## 2019-01-03 RX ADMIN — ATORVASTATIN CALCIUM 40 MG: 40 TABLET, FILM COATED ORAL at 21:23

## 2019-01-03 RX ADMIN — SODIUM CHLORIDE 8 MG/HR: 900 INJECTION INTRAVENOUS at 23:22

## 2019-01-03 RX ADMIN — PROPOFOL 50 MG: 10 INJECTION, EMULSION INTRAVENOUS at 12:03

## 2019-01-03 RX ADMIN — Medication 10 ML: at 21:26

## 2019-01-03 RX ADMIN — SODIUM CHLORIDE 8 MG/HR: 900 INJECTION INTRAVENOUS at 13:45

## 2019-01-03 RX ADMIN — Medication 10 ML: at 15:12

## 2019-01-03 RX ADMIN — DILTIAZEM HYDROCHLORIDE 15 MG/HR: 5 INJECTION INTRAVENOUS at 04:18

## 2019-01-03 RX ADMIN — SODIUM CHLORIDE 8 MG/HR: 900 INJECTION INTRAVENOUS at 03:03

## 2019-01-03 RX ADMIN — GABAPENTIN 1200 MG: 400 CAPSULE ORAL at 21:23

## 2019-01-03 RX ADMIN — PROPOFOL 50 MG: 10 INJECTION, EMULSION INTRAVENOUS at 11:59

## 2019-01-03 NOTE — ROUTINE PROCESS
1440 - TRANSFER - IN REPORT: Verbal report received from [facility], RN (name) on Kana Odonnell  being received from ICU 2700 (unit) for routine progression of care. Report consisted of patients Situation, Background, Assessment and Recommendations(SBAR). Information from the following report(s) SBAR, Kardex, Procedure Summary, Intake/Output, MAR, Recent Results, Med Rec Status and Cardiac Rhythm NSR w/PVC's was reviewed with the receiving nurse. Opportunity for questions and clarification was provided. 1515 - Pt transferred to unit via stretcher from ICU 2700. 
 
1530 - Shift assessment completed. Skin assessment completed, pt has wound to left foot - awaiting wound consult. Pt has PICC Line in place to RUE with NS IVF and Protonix Drip insfusing. Pt alert and oriented x4. No respiratory distress noted. No c/o pain reported. Pt oriented to staff, room, call bell, telephone, and tv. Call bell within reach, bed in low position. Will continue to monitor. 
     
Bedside, Verbal and Written shift change report given to Marti Keyes RN (oncoming nurse) by Suellen Machado RN (offgoing nurse). Report included the following information SBAR, Kardex, Intake/Output, MAR, Recent Results, Med Rec Status, Procedure Summary and Cardiac Rhythm NSR w/1st Degree AVB/PVC's.

## 2019-01-03 NOTE — CONSULTS
[facility] - Consult Note    Date of  Admission: 1/2/2019  9:32 AM   Primary Care Physician:  Anna Marie Snider MD  Patient seen and examined independently. Patient presented in AF with GI bleed. He has converted to sinus overnight . Will continue IV Cardizem through procedure this morning and DC later today. Will review Echocardiogram. Preliminary EF normal. Agree with assessment and plan as noted below. Diana Paige MD   Assessment:     Patient Active Problem List   Diagnosis Code    ERRONEOUS ENCOUNTER--DISREGARD     Toe amputation status, left ([facility] D77.257    PVD (peripheral vascular disease) ([facility]) I73.9    A-fib ([facility]) I48.91    GI bleed K92.2    Hypocalcemia E83.51    Hypokalemia E87.6    PAD (peripheral artery disease) ([facility]) I73.9    Acute blood loss anemia D62     - GI bleed  - Paroxysmal atrial fibrillation, CHADS2-Vasc score 1 (Vascular disease)  - PAD with history of transmetatarsal amputation of the left foot  - Hypokalemia  - Tobacco use     Plan:     - No cardiac contraindication to proceed with EGD as planned. - Patient currently in sinus rhythm, discussed with RN to titrate to a lower rate and eventually discontinue. If ok with GI team, he can go to procedure on Cardizem drip as he has been going in and out of afib and sinus   - Echo completed today, report to follow     History of Present Illness: This is a 63 y.o. male admitted for GI bleed  A-fib ([facility] Patient complains of:  Nausea and vomiting    This is a 63year old male with a history of PVD and tobacco use that cardiology is seeing in consult due to afib. He reports that on New Years Eve, he began vomiting, and had diarrhea afterwards. He believes it may have been dark tarry appearance but is unsure. Denies any chest pain or palpitations, denies known hx afib. Also denies CVA, diabetes, hypertension or hx of blood clot. No hx of CAD or CHF. He confirms smoking cigarettes.          Cardiac risk factors: smoking/ tobacco exposure, male gender, hypertension      Review of Symptoms:  Except as stated above include:  Constitutional:  negative  Respiratory:  negative  Cardiovascular:  Per HPI  Gastrointestinal: negative  Genitourinary:  negative  Musculoskeletal:  Negative  Neurological:  Negative  Dermatological:  Negative  Endocrinological: Negative  Psychological:  Negative    A comprehensive review of systems was negative except for that written in the HPI. Past Medical History:     Past Medical History:   Diagnosis Date    Calculus of kidney     Depression     Hypertension     Sleep disorder     Vascular disorder of lower extremity     bilateral         Social History:     Social History     Socioeconomic History    Marital status:      Spouse name: Not on file    Number of children: Not on file    Years of education: Not on file    Highest education level: Not on file   Tobacco Use    Smoking status: Current Every Day Smoker     Packs/day: 0.25    Smokeless tobacco: Never Used   Substance and Sexual Activity    Alcohol use:  Yes    Drug use: Yes     Frequency: 1.0 times per week     Types: Marijuana     Comment: rarely    Sexual activity: Not Currently   Social History Narrative    ** Merged History Encounter **             Family History:     Family History   Problem Relation Age of Onset    Arthritis-osteo Mother     Diabetes Father     Heart Disease Father     Melanoma Father     Psychiatric Disorder Brother         Medications:   No Known Allergies     Current Facility-Administered Medications   Medication Dose Route Frequency    pantoprazole (PROTONIX) 40 mg in 0.9% sodium chloride (MBP/ADV) 50 mL MBP  8 mg/hr IntraVENous CONTINUOUS    dilTIAZem (CARDIZEM) 125 mg in 0.9% sodium chloride 125 mL infusion  0-15 mg/hr IntraVENous TITRATE    0.9% sodium chloride infusion  75 mL/hr IntraVENous CONTINUOUS    sodium chloride (NS) flush 5-10 mL  5-10 mL IntraVENous Q8H    sodium chloride (NS) flush 5-10 mL  5-10 mL IntraVENous PRN    ELECTROLYTE REPLACEMENT PROTOCOL  1 Each Other PRN    ELECTROLYTE REPLACEMENT PROTOCOL  1 Each Other PRN    ELECTROLYTE REPLACEMENT PROTOCOL  1 Each Other PRN    ELECTROLYTE REPLACEMENT PROTOCOL  1 Each Other PRN    sodium chloride (NS) flush 10-30 mL  10-30 mL InterCATHeter PRN    sodium chloride (NS) flush 10 mL  10 mL InterCATHeter Q24H    sodium chloride (NS) flush 10 mL  10 mL InterCATHeter PRN    sodium chloride (NS) flush 10-40 mL  10-40 mL InterCATHeter Q8H    bacitracin 500 unit/gram packet 1 Packet  1 Packet Topical PRN    atorvastatin (LIPITOR) tablet 40 mg  40 mg Oral QHS    gabapentin (NEURONTIN) capsule 100 mg  100 mg Oral TID    venlafaxine-SR (EFFEXOR-XR) capsule 150 mg  150 mg Oral DAILY    [START ON 1/4/2019] aspirin tablet 325 mg  325 mg Oral DAILY         Physical Exam:     Visit Vitals  /74 (BP 1 Location: Left arm, BP Patient Position: At rest)   Pulse 99   Temp 97.9 °F (36.6 °C)   Resp 24   Ht 6' 2\" (1.88 m)   Wt 130 lb (59 kg)   SpO2 99%   BMI 16.69 kg/m²     BP Readings from Last 3 Encounters:   01/03/19 118/74   06/30/18 (!) 159/91   06/08/18 (!) 163/92     Pulse Readings from Last 3 Encounters:   01/03/19 99   06/30/18 95   06/08/18 70     Wt Readings from Last 3 Encounters:   01/02/19 130 lb (59 kg)   06/30/18 143 lb (64.9 kg)   06/08/18 146 lb 3.2 oz (66.3 kg)       General:  alert, cooperative, no distress  Neck:  nontender, no JVD  Lungs:  clear to auscultation bilaterally  Heart:  regular rate and rhythm, S1, S2 normal, no murmur, click, rub or gallop  Abdomen:  abdomen is soft without significant tenderness, masses, organomegaly or guarding  Extremities:  extremities normal, atraumatic, no cyanosis or edema  Skin: Warm and dry.  no hyperpigmentation, vitiligo, or suspicious lesions  Neuro: alert, oriented x3, affect appropriate  Psych: non focal     Data Review:     Recent Labs     01/02/19  1000   WBC 16.9*   HGB 8.1*   HCT 23.9*        Recent Labs     01/03/19  0415 01/02/19  2145 01/02/19  1000    140 148*   K 3.9 3.5 2.4*   * 110* 120*   CO2 24 22 15*   * 182* 129*   BUN 38* 48* 39*   CREA 0.79 0.95 0.93   CA 8.0* 7.9* 5.4*   MG 2.3  --  1.6   ALB 2.8*  --  2.0*   SGOT 17  --  19   ALT 26  --  16   INR  --   --  1.7*       Results for orders placed or performed during the hospital encounter of 01/02/19   EKG, 12 LEAD, INITIAL   Result Value Ref Range    Ventricular Rate 138 BPM    Atrial Rate 131 BPM    QRS Duration 96 ms    Q-T Interval 282 ms    QTC Calculation (Bezet) 427 ms    Calculated R Axis 48 degrees    Calculated T Axis 90 degrees    Diagnosis       Atrial fibrillation with rapid ventricular response  Nonspecific ST and T wave abnormality  Abnormal ECG  When compared with ECG of 05-JUN-2018 17:30,  Atrial fibrillation has replaced Sinus rhythm  Vent. rate has increased BY  46 BPM  ST now depressed in Inferior leads  ST now depressed in Lateral leads  Confirmed by Jovon Todd ([id number]) on 1/2/2019 11:32:47 AM         All Cardiac Markers in the last 24 hours:    Lab Results   Component Value Date/Time    CPK 20 (L) 01/02/2019 10:00 AM    CKMB <1.0 01/02/2019 10:00 AM    CKND1  01/02/2019 10:00 AM     CALCULATION NOT PERFORMED WHEN RESULT IS BELOW LINEAR LIMIT    TROIQ 0.05 (H) 01/02/2019 10:00 AM       Last Lipid:  No results found for: CHOL, CHOLX, CHLST, CHOLV, HDL, LDL, LDLC, DLDLP, TGLX, TRIGL, TRIGP, CHHD, CHHDX    Signed By: Masha Yap     January 3, 2019

## 2019-01-03 NOTE — ROUTINE PROCESS
TRANSFER - IN REPORT: 
 
Verbal report received from [facility] on Tisha Roll  being received from [id number] for routine post - op Report consisted of patients Situation, Background, Assessment and  
Recommendations(SBAR). Information from the following report(s) SBAR and Cardiac Rhythm NSR was reviewed with the receiving nurse. Opportunity for questions and clarification was provided. Assessment completed upon patients arrival to unit and care assumed.

## 2019-01-03 NOTE — PROGRESS NOTES
Patient converted to NSR around 2215 on 1/2/18 Converted back to Afib around 2350 1/2/18 Converted to NSR around 0200 this Am and has remained in sinus. Cardizem drip remains at this time.

## 2019-01-03 NOTE — PROGRESS NOTES
Problem: Falls - Risk of 
Goal: *Absence of Falls Document Eulogio Hart Fall Risk and appropriate interventions in the flowsheet. Outcome: Progressing Towards Goal 
Fall Risk Interventions: 
Mobility Interventions: Strengthening exercises (ROM-active/passive) Elimination Interventions: Call light in reach, Urinal in reach History of Falls Interventions: Door open when patient unattended, Evaluate medications/consider consulting pharmacy, Room close to nurse's station Problem: Pressure Injury - Risk of 
Goal: *Prevention of pressure injury Document Hal Scale and appropriate interventions in the flowsheet. Outcome: Progressing Towards Goal 
Pressure Injury Interventions: 
Sensory Interventions: Turn and reposition approx. every two hours (pillows and wedges if needed) Activity Interventions: Pressure redistribution bed/mattress(bed type) Mobility Interventions: Pressure redistribution bed/mattress (bed type) Nutrition Interventions: Discuss nutritional consult with provider, Document food/fluid/supplement intake Comments: Day 1 Patient is alert and oriented. Denies pain or discomfort at this time. Slowly progressing towards goals for discharge.

## 2019-01-03 NOTE — ANESTHESIA PREPROCEDURE EVALUATION
Anesthetic History No history of anesthetic complications Review of Systems / Medical History Patient summary reviewed and pertinent labs reviewed Pulmonary Within defined limits Neuro/Psych Within defined limits Cardiovascular Dysrhythmias : atrial fibrillation Exercise tolerance: >4 METS 
  
GI/Hepatic/Renal 
Within defined limits Comments: Gi bleed Endo/Other Anemia Other Findings Comments:  
 
 
  
 
 
 
 
Physical Exam 
 
Airway Mallampati: II 
TM Distance: 4 - 6 cm Neck ROM: normal range of motion Mouth opening: Normal 
 
 Cardiovascular Regular rate and rhythm,  S1 and S2 normal,  no murmur, click, rub, or gallop Rhythm: regular Rate: normal 
 
 
 
 Dental 
No notable dental hx Pulmonary Breath sounds clear to auscultation Abdominal 
GI exam deferred Other Findings Anesthetic Plan ASA: 2 Anesthesia type: MAC Induction: Intravenous Anesthetic plan and risks discussed with: Patient

## 2019-01-03 NOTE — PROGRESS NOTES
conducted an initial consultation and Spiritual Assessment for Bhumi Carreon, who is a 63 y. o.,male. Patients Primary Language is: [place]. According to the patients EMR Restorationism Affiliation is: [place]. The reason the Patient came to the hospital is:  
Patient Active Problem List  
 Diagnosis Date Noted  A-fib ([facility] 01/02/2019  GI bleed 01/02/2019  Hypocalcemia 01/02/2019  Hypokalemia 01/02/2019  PAD (peripheral artery disease) ([facility] 01/02/2019  Acute blood loss anemia 01/02/2019  Toe amputation status, left ([facility] 05/18/2018  PVD (peripheral vascular disease) ([facility] 05/18/2018  ERRONEOUS ENCOUNTER--DISREGARD 05/14/2018 The  provided the following Interventions: 
Initiated a relationship of care and support. Explored issues of naveed, spirituality and/or Jainism needs while hospitalized. Listened empathically. Provided chaplaincy education. Provided information about Spiritual Care Services. Offered prayer and assurance of continued prayers on patient's behalf. Chart reviewed. The following outcomes were achieved: 
Patient shared some information about their medical narrative and spiritual journey/beliefs. Patient processed feeling about current hospitalization. Patient expressed gratitude for the 's visit. Assessment: 
Patient did not indicate any spiritual or Jainism issues which require Spiritual Care Services interventions at this time. Patient does not have any Jainism/cultural needs that will affect patients preferences in health care. Plan: 
Chaplains will continue to follow and will provide pastoral care on an as needed or requested basis.  recommends bedside caregivers page  on duty if patient shows signs of acute spiritual or emotional distress. [facility] Staff  Spiritual Care  
[phone]

## 2019-01-03 NOTE — PROGRESS NOTES
Progress Note Patient: Bhumi Carreon               Sex: male          DOA: 1/2/2019 YOB: 1960      Age:  63 y.o.        LOS:  LOS: 1 day CHIEF COMPLAINT: Rapid Heart Rate and Lethargy Subjective:  
 
Pt denies complaints. Converted to sinus. Going for EGD today. Objective:  
  
Visit Vitals /64 Pulse 68 Temp 97.8 °F (36.6 °C) Resp 20 Ht 6' 2\" (1.88 m) Wt 59 kg (130 lb) SpO2 100% BMI 16.69 kg/m² Physical Exam: 
Gen:  No distress, alert HEENT:  Normal cephalic atraumatic, extra-occular movements are intact. Neck:  Supple, No JVD Lungs:  Clear bilaterally, no wheeze, no rales, normal effort Heart:  Irregularly irregular rhythm, normal S1 and S2, no edema Abdomen:  Soft, non tender, normal bowel sounds, no guarding. Extremities:  Well perfused, no cyanosis or edema. Left foot wrapped in ACE wrap Neurological:  Awake and alert, CN's are intact, normal strength throughout extremities Skin:  No rashes or moles Mental Status:  Normal thought process, does not appear anxious Lab/Data Reviewed: All lab results for the last 24 hours reviewed. XR CHEST PORT Final Result IMPRESSION:  
PICC line in reasonable position. No active disease. XR CHEST PORT Final Result IMPRESSION:  
  
No active cardiopulmonary disease. Assessment/Plan Principal Problem: 
  GI bleed (1/2/2019) Active Problems: 
  A-fib ([address] (1/2/2019) Hypocalcemia (1/2/2019) Hypokalemia (1/2/2019) PAD (peripheral artery disease) ([address] (1/2/2019) Acute blood loss anemia (1/2/2019) Plan: 
Replace electrolytes per ICU protocol, improved IV protonix for UGIB. H&H stable EGD today Cont cardizem gtt for rate control, Cardiology consulted Echo read pending D/w Cardiology, OK to hold plavix for EGD 
DVT prophylaxis - hold chemoprophylaxis given UGIB. SCDs ordered

## 2019-01-03 NOTE — PROGRESS NOTES
Pulmonary progress note History 78-year-old male presented with upper GI bleeding in atrial fibrillation with rapid ventricular response. The patient's family has a history of early coronary artery disease and he is smoked for 40 years. Since yesterday evening he has had no further coffee ground emesis or black stools. He denies chest pains or shortness of breath. Blood pressure 118/74, pulse 99, temperature 97.9 °F (36.6 °C), resp. rate 24, height 6' 2\" (1.88 m), weight 59 kg (130 lb), SpO2 99 %. He is alert, oriented and in no respiratory distress at rest.  Affect is normal. 
Sinus rhythm. Echo results pending Labs: No repeat hemoglobin. Sodium 143, creatinine 0.79, albumin 2.8. Assessment Upper GI bleeding A. fib with rapid ventricular response Tobacco abuse Plan 
EGD Treatment of A. fib with RVR per cardiology Encourage tobacco cessation

## 2019-01-03 NOTE — PROGRESS NOTES
Report taken from HUNTER Poe and care assumed. Drips confirmed. Patient is alert and oriented. Currently on RA. Denies pain or discomfort at this time. No acute distress noted.

## 2019-01-03 NOTE — ANESTHESIA POSTPROCEDURE EVALUATION
Procedure(s): ESOPHAGOGASTRODUODENOSCOPY (EGD). Anesthesia Post Evaluation Multimodal analgesia: multimodal analgesia used between 6 hours prior to anesthesia start to PACU discharge Patient location during evaluation: bedside Patient participation: complete - patient participated Level of consciousness: awake Pain management: adequate Airway patency: patent Anesthetic complications: no 
Cardiovascular status: stable Respiratory status: acceptable Hydration status: acceptable Post anesthesia nausea and vomiting:  controlled Visit Vitals /81 (BP 1 Location: Left arm, BP Patient Position: Supine) Pulse 67 Temp 36.4 °C (97.6 °F) Resp 17 Ht 6' 2\" (1.88 m) Wt 59 kg (130 lb) SpO2 100% BMI 16.69 kg/m²

## 2019-01-03 NOTE — ADT AUTH CERT NOTES
MD Isaac Shelton  NPI # [id number] HOSPITAL NPI # [id number] TAX ID # [id number] Facility Name: [address] [place] [address]           
  
  
  
  
  
   
Patient Demographics Patient Name Jocelyne Simmons Sex Male  
1960 Address [address] [facility] [address] Phone [phone] (Home) [phone] (Mobile) Hospital Account Name Acct ID Class Status Primary Coverage Jocelyne Simmons [id number] INPATIENT Open [address] COMPLETE CARE  
  
   
Guarantor Account (for Hospital Account [de-identified]) Name Relation to Pt Service Area Active? Acct Type Jocelyne Simmons Self [address] Yes Personal/Family Address Phone Dillwyn Migel [address] [phone])    
  
   
Coverage Information (for Hospital Account [de-identified]) F/O Payor/Plan Subscriber  Subscriber Sex Precert #  
CCCP MEDICAID/[place] COMPLETE CARE 62 M Subscriber Subscriber # Jocelyne Simmons [id number] [facility] # Group Name  
 [facility] Address Phone [address] [facility] [address] [place] Policy Number Status Effective Date Benefits Phone [id number] -  - Auth/Cert  
  
  
   
Diagnosis Codes Comments Atrial fibrillation with RVR (HCC)  ICD-10-CM: I48.91 
ICD-9-CM: 427.31   
  
   
Admission Information Arrival Date/Time: 2019 0919 Admit Date/Time: 2019 0932 IP Adm. Date/Time: 2019 1224 Admission Type: Emergency Point of Origin: Non-health Care Facility/self Admit Category:   
Means of Arrival: Ambulance Primary Service: Medicine Secondary Service: N/A Transfer Source:  Service Area: [address] [place] [address] Unit: [place] [address] INTENSIVE CARE 2 Admit Provider: Isaac Shelton MD Attending Provider: Duane Salas MD Referring Provider:   
Admission Information Attending Provider Admission Dx Admitted On  
Isaac Shelton MD  19 Service Isolation Code Status MEDICINE  Full Code Allergies Advance Care Planning No Known Allergies Jump to the Activity    
Admission Information Unit/Bed: [place] [address] INTENSIVE CARE  Service: MEDICINE Admitting provider: Fernie Warner MD Phone: [phone] Attending provider: Fernie Warner MD Phone: [phone] PCP: Marium Rucker MD Phone: [phone] Admission dx: GI bleed Patient class: I Admission type: ER    
Patient Demographics Patient Name Severino Mai, [address] [facility] 
[id number] Sex Male  
1960 Address [place] [address] [address] [place] [address] Phone [phone] (Home) [phone] (Mobile) CSN:  
[id number] Admit Date: Admit Time Room Bed 2019  9:32 AM 2704 [49986] 01 [68163] Attending Providers Provider Pager From To  
Jack Alcantara MD  19 Fernie Warner MD  19 Emergency Contact(s) Name Relation Home Work Mobile Tila Jones [phone] Santos Barnes [phone] Utilization Reviews [employer] (2019) by Fransisco Santamaria RN Review Entered Review Status 1/3/2019 10:59 In Primary Criteria Review REVIEW SUMMARY 
  
Patient: Faye Esteban Review Number: [id number] Review Status: In Primary 
  
Condition Specific: Yes 
  
Condition Level Of Care Code: CRITICAL Condition Level Of Care Description: Critical 
  
  
OUTCOMES Outcome Type: Primary 
  
  
  
REVIEW DETAILS 
  
Service Date: 2019 Admit Date: 2019 Product: [address] Adult Subset: Arrhythmia 
    (Symptom or finding within 24h) 
  (Excludes PO medications unless noted)         [X] Select Day, One: 
            [X] Episode Day 1, One: 
                [X] CRITICAL, >= One: 
                    [X] Atrial or ventricular arrhythmia, Both: 
                        [X] Finding, >= One: 
                            [X] Atrial fibrillation, atrial flutter, or superventricular tachycardia and hemodynamic instability ~--Admin, IQ Admin Admin on 01- 10:49 AM--~ 
                            99.8 161 91/68 16 99% ra  
                            59 kg  
                            hr 161-126  
                            hx htn acute moderate gen weakness for 2 days pt says \"he doesnt feel good\" assoc sx melena today after being constipated for 2 days vomited all day yesterday clear with coffee ground appearance  
                            pt uses thc pt is on asa and plavix  
                            pt is homeless  
                            + palpitations blood in stool vomiting neck pain ill feeling weakness  
                            hr irreg irreg rhythm  
                            guaiac +  
                            pallor  
                            ekg afib 138 [X] Continuous cardiac monitoring (excludes Holter), Both: 
                            [X] Continuous cardiac monitoring (excludes Holter) 
                            ~--Admin, IQ Admin Admin on 01- 10:49 AM--~ 
                            icu monitoring                             [X] Intervention, >= One: 
                                [X] IV antiarrhythmic 
                                ~--Admin, IQ Admin Admin on 01- 10:59 AM--~ 
                                iv cardizem drip cont (10-15 mg )/hr  
                                iv cardizem 15 mg given x1  
                                 
                                 
                                iv calcium gluconate 2 gram given x1 1 mgra given x1  
                                iv digoxin 500 mcg given x1  
                                 
 iv 2 gram mag sulfate given x2  
                                iv protonix 80 mg given x1 protonix drip cont 8 mg/hr  
                                 
                                iv kcl 10 meq given x4  
                                ns bolus 1 liter given x1  
                                wbc 16.9  inr 1.7 na 148 k 2.4 mag 1.6 caa 5.4  
                                na 140 k 3.5 caa 7.9  
                                 
                                 
                                hx pad s/p lower ext stent 3/2018 presents with weakness up all night vomiting dark brown coffee ground emesis he says he is very weak and cannot walk  
                                af with rvr severe electrolyte depletion hb 8.1 baseline hgb 11 stool + pt denieds afib denieds hx cad or stents  
                                cardizem drip and protonix drip for UGIB  
                                hypocalcemia hypokalemia pad acute blood loss anemia  
                                 
                                replace electrolytes iv protonix gi consult npo at midnite for egd in am cardizem cardiology consult echo ok to hold plavix for egd scds 
                                upper endo vol resuscitation monitor for refeeding syndrome rate control enc smoking cessation 
                                 
                                 
                                 
  
Version: InterQual® 2018.1 Sesar Zepeda  © 2018 [employer] and/or one of its [place]. All Rights Reserved. CPT only © 2017 American [employer]. All Rights Reserved.

## 2019-01-03 NOTE — CONSULTS
[address]  MR#: [id number]  : 1960  ACCOUNT #: [de-identified]   DATE OF SERVICE: 2019    REFERRING PHYSICIAN:  Dr. Justin Meyers:  Thuy Ho. MD Alvarado    REASON FOR CONSULTATION:  I was asked to see the patient because of coffee-ground emesis and melena. HISTORY OF PRESENT ILLNESS:  A 69-year-old male who presented to the emergency room with a history of generalized weakness and in summary not feeling well. He reports that yesterday he had a hard firm dark black stool with straining. He had a small bowel movement this morning. Yesterday, he had vomiting for unclear reasons, which contained coffee-ground material.  He has never had similar vomitus or bowel movements in the past.  He has taken aspirin and Plavix on a regular basis, but until yesterday he was feeling well. He has been feeling weak and somewhat generally poorly for the last 24-48 hours. He denies any history of peptic ulcer disease, pancreatitis, hiatal hernia. He denies a history of acid reflux disease. He has no trouble swallowing. He does take aspirin 81 mg daily and has done so since March of this year when he had amputation of toes of toes in the left lower extremity because of peripheral vascular disease and gangrene. He had a left fem-pop bypass and right stent placed to lower extremities. He has a history of smoking half a pack a day for 40+ years. Still smoking 2 or 3 cigarettes a day despite the peripheral vascular disease resulting in amputation of the toes. The patient reports being a heavy weekend drinker, bourbon a fifth a day for many years, but he quit in March. He does use marijuana occasionally. He has no known history of anemia to require blood transfusion or iron therapy. He has no history of hepatitis, jaundice, chronic liver disease or chronically elevated liver function tests.   He has never undergone routine health maintenance colonoscopy. He has had no red blood per rectum, just the black dark stool that was hard with straining yesterday and again today. In the emergency, he was found to have tachycardia with atrial fibrillation, rapid rate ventricular response. This has been slowed to medications. He currently has a pulse in the 88 range and a normal blood pressure. We were asked to see him regarding further evaluation, it appears to be acute GI blood loss, upper GI source. PAST MEDICAL HISTORY:  Significant for:  1. Peripheral vascular disease resulting in left fem-pop bypass surgery and amputation of toes in the left foot in 03/2018. Dr. Cy Samuels and right vascular stent placed at the same time. 2.  New onset atrial fibrillation diagnosed in March with previously good control of rate, poor rate control today, although the patient reports he is taking his medicines as prescribed. 3.   He has a history of kidney stones, depression, hypertension, sleep disorder. ALLERGIES:  NONE KNOWN TO MEDICATIONS. MEDICATIONS:  At time of this admission include hydrocodone which is Norco 5/325 mg tablets every 8 hours for pain, potassium chloride 10 mEq sustained release 2 tablets by mouth once daily, Nicoderm CQ 21 mg 24 hours 1 patch every day, aspirin 325 mg daily, atorvastatin 40 mg p.o. at bedtime, Plavix 75 mg once a day, naloxone 4 mg as needed nasal spray, morphine CR which is MS Contin 15 mg CR tablets once a day, Effexor  mg once a day, Neurontin 100 mg 3 times a day, Percocet 10/325 mg tablets 1 by mouth every 8 hours as needed for pain, Theragran M tablets once a day, Norvasc 10 mg once a day, melatonin 3 mg once a day, MiraLax 17 grams once a day. SOCIAL HISTORY:  The patient is , has been smoking half a pack a day for 40+ years is down to 2-3 cigarettes a day since March. Alcohol intake fifth of bourbon every weekend for the last 40 years, minimal since March.   Occasional marijuana, perhaps once or twice a week. No IV drugs. He is currently disabled because of peripheral vascular disease. FAMILY HISTORY:  Positive for osteoarthritis, diabetes, ASCVD, multiple myeloma psychiatric disorder. His brother committed suicide. His mother is 80years old and still fairly healthy. REVIEW OF SYSTEMS:  Complete review was taken, positive as per history of present illness, negatives not listed here. PHYSICAL EXAMINATION:  GENERAL:  The patient is a pleasant, articulate, healthy-appearing male, resting comfortably in bed in no obvious distress. NODES:  No supraclavicular, axillary or cervical nodes palpable. CHEST:  Clear to auscultation, symmetric breath sounds, good air movement. HEART:  First and second heart sounds are normal.  No murmurs, rubs or gallops. ABDOMEN:  Soft, nontender, no organomegaly or mass palpable. Bowel sounds are normal.  There are no bruits. EXTREMITIES:  No pedal edema. RECTAL:  Not performed. LABORATORY DATA:  White count of 16.9, hemoglobin 8.1, hematocrit 23.9, MCV 91.9 and platelet count of 729 at 10:00 this morning. On 06/30/2018, hemoglobin and hematocrit 9.5, 28.2. On 06/08, hemoglobin and hematocrit 11.4 and 35. On 06/05, hemoglobin and hematocrit 11.8 and 34.2. White count 16.9 today with a platelet count of 636. BMP today showed a potassium low at 2.4, glucose 129, BUN of 39, creatinine normal at 0.93. Calcium low at 5.4, magnesium normal at 1.6. Estimated GFR is greater than 60. Liver enzymes showed a bilirubin 0.4, total protein of 4 and albumin of 2, globulin normal at 2, ALT of 16, AST of 19, alkaline phosphatase 74. CPK 20, CK-MB less than 1. Troponin less than 0.05. Prothrombin time today was 20.1, INR of 1.7 with a PTT of 35.3. IMPRESSION:    1. Weakness, malaise with atrial fibrillation, rapid ventricular response, the most likely cause.   Those symptoms resolved completely with normalization of ventricular rate with the pharmacologic intervention in the emergency room. 2.  Significant hemoglobin. Hemoglobin of 6.8 or so with normal MCV. This dropped almost 5 g since June. Most likely related to occult gastrointestinal blood loss, most likely related to aspirin 325 mg daily in addition to Plavix. 3.  Subacute upper GI bleed with coffee-ground emesis and a report of melena of the last 24-48 hours, most likely related to NSAID induced gastropathy or ulcer disease less likely, AVMs, gastritis, esophagitis or mass lesion or polyp. 3  A 63year-old patient who is not up to doing routine health maintenance colonoscopy. He is deliberately avoiding colonoscopy after the last 7 years. 5.  Atrial fibrillation with rapid ventricular response. 6.  Long history of smoking and still smoking despite peripheral vascular disease. PLAN:  1. Upper GI endoscopy with possible biopsy cautery therapy tomorrow looking for upper GI source of bleeding. Unfortunately, this may be related to aspirin in which case aspirin may need to be discontinued and the patient was placed on alternate anticoagulation, perhaps etcetera, that does not cause mucosal damage and would not cause the patient to be bleeding from a normal mucosa. 2.  At some point after discharge, the patient will need to return to the office and arrange outpatient colonoscopy and routine health maintenance basis. 3.  Further cardiac evaluation and treatment depending as per Cardiology.     PATIENTS LOCATION:  [facility]      MD DUYEN Burris / PHI  D: 01/02/2019 18:44     T: 01/02/2019 21:20  JOB #: [id number]

## 2019-01-03 NOTE — PROGRESS NOTES
Nutrition initial assessment/ 
Plan of care RECOMMENDATIONS:  
 
1. Full liquids 2. Ensure TID 3. Monitor weight, labs and PO intake 4. RD to follow GOALS:  
 
1. PO intake meets >75% of protein/calorie needs by 1/9 
2. Weight Maintenance/Gradual weight gain (1-2 lb by 1/11) ASSESSMENT:  
 
Weight status is classified as normal per BMI of 19.8. However, patient is 84% IBW and with 19%unintentional weight loss over past 1.5 mth and 9% weight loss over past 6 months per documented weights. PO intake is poor. Added Ensure TID for additional calories/protein. Labs noted. Patient with hypoalbuminemia and hypocalcemia. H/H (7/21.1). Nutrition recommendations listed. RD to follow. Nutrition Diagnoses:  
Unintentional weight loss related to homeless; financial constraints as evidenced by 19% weight loss over past 1.5 mth; 9% over past 6 mth. Meets Criteria for Chronic Malnutrition  
[x] Severe Malnutrition, as evidenced by: 
 [] Severe muscle wasting, loss of subcutaneous fat 
 [x] Nutritional intake of <75% of recommended intake for >1 month 
 [x] Weight loss of  >5% in 1 month, >7.5% in 3 months, >10% in 6 months, >20% in 1 year 
 [] Severe edema Nutrition Risk:  [] High  [x] Moderate []  Low SUBJECTIVE/OBJECTIVE:  
  
Transferred from ICU. Patient with fatigue and nausea x 3 days PTA. S/P EGD with biopsy on 1/3. Patient with worsening left foot infection. S/P TMA last March. Plan for left foot first met debridement with bone biopsy on 1/5. Patient reports having weight loss due to being homeless and not able to afford much. Stated weight of 190 lb last March and has recently been around 160 lb. Doesn't care for full liquids and is wanting solid food. Explained diet advancement process. Agreeable to trying Ensure supplements with meals. Denies food allergies and problems with chewing/swallowing. Encouraged adequate intake. Will monitor diet advancement. Information Obtained from: [x] Chart Review [x] Patient 
 [] Family/Caregiver 
 [] Nurse/Physician 
 [] Interdisciplinary Meeting/Rounds Diet:Full liquids Medications: [x] Reviewed (IVF: NS at 75 ml/hr; Lipitor 40 mg) Allergies: [x] Reviewed Encounter Diagnoses ICD-10-CM ICD-9-CM 1. Atrial fibrillation with RVR ([facility]) I48.91 427.31  
2. Acute upper GI bleed K92.2 578.9 3. Hypokalemia E87.6 276.8 4. Hypocalcemia E83.51 275.41 Past Medical History:  
Diagnosis Date  Calculus of kidney  Depression  Hypertension  Sleep disorder  Vascular disorder of lower extremity   
 bilateral  
  
Labs:   
Lab Results Component Value Date/Time Sodium 143 01/03/2019 04:15 AM  
 Potassium 3.9 01/03/2019 04:15 AM  
 Chloride 113 (H) 01/03/2019 04:15 AM  
 CO2 24 01/03/2019 04:15 AM  
 Anion gap 6 01/03/2019 04:15 AM  
 Glucose 117 (H) 01/03/2019 04:15 AM  
 BUN 38 (H) 01/03/2019 04:15 AM  
 Creatinine 0.79 01/03/2019 04:15 AM  
 Calcium 8.0 (L) 01/03/2019 04:15 AM  
 Magnesium 2.3 01/03/2019 04:15 AM  
 Albumin 2.8 (L) 01/03/2019 04:15 AM  
 
Anthropometrics: BMI (calculated): 19.8 Last 3 Recorded Weights in this Encounter 01/02/19 0930 01/03/19 0948 01/03/19 2075 Weight: 59 kg (130 lb) 59 kg (130 lb) 59 kg (130 lb) Ht Readings from Last 1 Encounters:  
01/03/19 6' 2\" (1.88 m) Weight Metrics 1/3/2019 6/30/2018 6/8/2018 5/18/2018 5/14/2018 5/11/2018 Weight 130 lb 143 lb 146 lb 3.2 oz 142 lb 9.6 oz 145 lb 150 lb BMI 16.69 kg/m2 18.36 kg/m2 18.77 kg/m2 18.31 kg/m2 18.62 kg/m2 19.79 kg/m2  
 160 lb (11/17/18)- per Care Everywhere Patient Vitals for the past 100 hrs: 
 % Diet Eaten 01/04/19 0840 100 % 01/02/19 2000 0 % IBW: 154 lb %IBW: 84% UBW: 160lb [x] Weight Loss (19% x 1.5 mth and 9% x 6 mth) [] Weight Gain [] Weight Stable Estimated Nutrition Needs: [x] [facility] Calories: 4760-2792 Kcal Based on:   [x] Actual BW   
Protein:   70-85 g Based on:   [x] Actual BW   
 Fluid:       2872-4105 ml Based on:   [x] Actual BW  
 
 [x] No Cultural, Religion or ethnic dietary need identified. [] Cultural, Religion and ethnic food preferences identified and addressed Wt Status:  [x] Normal (18.6 - 24.9) [] Underweight (<18.5) [] Overweight (25 - 29.9) [] Mild Obesity (30 - 34.9)  [] Moderate Obesity (35 - 39.9) [] Morbid Obesity (40+) Nutrition Problems Identified:  
[x] Suboptimal PO intake 
[] Food Allergies [] Difficulty chewing/swallowing/poor dentition 
[] Constipation/Diarrhea  
[] Nausea/Vomiting  
[] None 
[] Other:  
 
Plan:  
[x] Therapeutic Diet 
[]  Obtained/adjusted food preferences/tolerances and/or snacks options [x]  Supplements added  
[] Occupational therapy following for feeding techniques []  HS snack added  
[]  Modify diet texture  
[]  Modify diet for food allergies []  Assist with menu selection  
[x]  Monitor PO intake on meal rounds  
[x]  Continue inpatient monitoring and intervention  
[]  Participated in discharge planning/Interdisciplinary rounds/Team meetings  
[]  Other:  
 
Education Needs: 
 [] Not appropriate for teaching at this time due to: 
 [x] Identified and addressed Nutrition Monitoring and Evaluation: 
[x] Continue ongoing monitoring and intervention 
[] Other [address]

## 2019-01-03 NOTE — ROUTINE PROCESS
TRANSFER - OUT REPORT: 
 
Verbal report given to Lizette(name) on Maricruz Patton  being transferred to 2700(unit) for routine post - op Report consisted of patients Situation, Background, Assessment and  
Recommendations(SBAR). Information from the following report(s) SBAR and Recent Results was reviewed with the receiving nurse. Lines: PICC Triple Lumen 01/02/19 Right;Brachial (Active) Central Line Being Utilized Yes 1/3/2019  8:00 AM  
Criteria for Appropriate Use Limited/no vessel suitable for conventional peripheral access 1/3/2019  8:00 AM  
Site Assessment Clean, dry, & intact 1/3/2019  8:00 AM  
Phlebitis Assessment 0 1/3/2019  8:00 AM  
Infiltration Assessment 0 1/3/2019  8:00 AM  
Arm Circumference (cm) 31 cm 1/2/2019  3:02 PM  
Date of Last Dressing Change 01/02/19 1/3/2019  8:00 AM  
Dressing Status Clean, dry, & intact 1/3/2019  8:00 AM  
External Catheter Length (cm) 0 centimeters 1/2/2019  3:02 PM  
Dressing Type Disk with Chlorhexadine gluconate (CHG) 1/3/2019  8:00 AM  
Action Taken Open ports on tubing capped 1/3/2019  4:00 AM  
Hub Color/Line Status Gray;Capped 1/3/2019  4:00 AM  
Positive Blood Return (Site #1) No 1/3/2019  4:00 AM  
Hub Color/Line Status Red; Infusing 1/3/2019  4:00 AM  
Positive Blood Return (Site #2) Yes 1/3/2019  4:00 AM  
Hub Color/Line Status White; Infusing 1/3/2019  4:00 AM  
Positive Blood Return (Site #3) Yes 1/3/2019  4:00 AM  
Alcohol Cap Used Yes 1/3/2019  4:00 AM  
  
 
Opportunity for questions and clarification was provided. Patient transported with: 
 Registered Nurse

## 2019-01-03 NOTE — PROCEDURES
Endoscopy Procedure Note    Patient: Robbi Matamoros MRN: [id number]  SSN: [id number]    YOB: 1960  Age: 63 y.o. Sex: male      Date/Time:  1/3/2019 12:06 PM    Esophagogastroduodenoscopy (EGD) Procedure Note    Procedure: Esophagogastroduodenoscopy with biopsy    IMPRESSION:   1. Severe ulcerative esophagitis from mid esophagus to distal esophagus status post biopsy to evaluate for viral process. 2. Moderate size hiatal hernia. 3. Normal appearing stomach. 4. Normal appearing duodenum. RECOMMENDATIONS:  1. Resume regular diet. 2. Continue pantoprazole  3. Full liquid diet. Indication: Hematemesis  :  Shanta Santiago MD  Assistants: PACU Nurse: Fransisco Davis RN  Scrub RN-1: Reynaldo Matthews RN    Referring Provider:   Joleen Melchor MD  History: The history and physical exam were reviewed and updated. Endoscope: Olympus GIF-190 diagnostic endoscope  Extent of Exam: third portion of the duodenum  ASA: ASA 3 - Patient with moderate systemic disease with functional limitations  Anethesia/Sedation:  MAC anesthesia Propofol    Description of the procedure: The procedure was discussed with the patient including risks, benefits, alternatives including risks of iv sedation, bleeding, perforation and aspiration. A safety timeout was performed. The patient was placed in the left lateral decubitus position. A bite block was placed. The patient was given incremental doses of intravenous sedation until moderate sedation was achieved. The patients vital signs were monitored at all times including heart rate/rhythm, blood pressure and oxygen saturation. The endoscope was then passed under direct visualization to the third portion of the duodenum. The endoscope was then slowly withdrawn while visualizing the mucosa. In the stomach a retroflexion was performed and gastric fundus and cardia visualized. The endoscope was then slowly withdrawn.   The patient was then transferred to recovery in stable condition. Findings:   1. Severe ulcerative esophagitis status biopsy to evaluate for viral process. 2. Moderate size hiatal hernia. 3. Normal appearing stomach. 4. Normal appearing duodenum. Specimens:   ID Type Source Tests Collected by Time Destination   1 :  Preservative Esophagus, Mid  Kathi Padgett MD 1/3/2019 5267 Pathology      Complications:   None; patient tolerated the procedure well.     EBL:Minimal    Discharge disposition:  Home in the company of  when able to ambulate    Shiana Garcia MD  January 3, 2019  12:06 PM

## 2019-01-03 NOTE — PROGRESS NOTES
Problem: Falls - Risk of 
Goal: *Absence of Falls Document Monique Michele Fall Risk and appropriate interventions in the flowsheet. Outcome: Progressing Towards Goal 
Fall Risk Interventions: 
Mobility Interventions: Patient to call before getting OOB Medication Interventions: Patient to call before getting OOB, Teach patient to arise slowly Elimination Interventions: Call light in reach, Patient to call for help with toileting needs, Toilet paper/wipes in reach, Toileting schedule/hourly rounds, Urinal in reach History of Falls Interventions: Consult care management for discharge planning, Door open when patient unattended, Investigate reason for fall, Room close to nurse's station Problem: Pressure Injury - Risk of 
Goal: *Prevention of pressure injury Document Hal Scale and appropriate interventions in the flowsheet. Outcome: Progressing Towards Goal 
Pressure Injury Interventions: 
Sensory Interventions: Assess changes in LOC, Avoid rigorous massage over bony prominences, Check visual cues for pain, Keep linens dry and wrinkle-free, Maintain/enhance activity level, Minimize linen layers, Monitor skin under medical devices, Pad between skin to skin, Pressure redistribution bed/mattress (bed type) Activity Interventions: Increase time out of bed, Pressure redistribution bed/mattress(bed type) Mobility Interventions: HOB 30 degrees or less, Pressure redistribution bed/mattress (bed type) Nutrition Interventions: Document food/fluid/supplement intake

## 2019-01-03 NOTE — PROGRESS NOTES
Reason for Admission:   GI bleed RRAT Score:   6 Plan for utilizing home health:   no    
                 
Likelihood of Readmission:  Mod/yellow due to homelessness, lack of financial resources Transition of Care Plan: Interviewed pt and confirmed face sheet info. Noted that pt states address of record is no longer correct - he is homeless at this time and\"moves around\". he does confirm cell phone number as correct. States that his emergency contact is his sister Gustabo Fatima ([phone]). Updates sent to pt registration. Pt states that prior to admission he was independent with all activities of daily living and used no DME. He does say that \"I should probably use a cane, but with my current situation I can only carry around so much crap\". Pt states that he did have PCP (Dr. Wilfred Fuentes),\" but that she never does anything for me\". Pt says he does get his meds through his medicaid, but states that he can only get 15 days at a time for all his meds. He became very angry when discussing this stating \"I'm not an addict but I can't get anything for pain - I had half my foot cut off and I'm the fabian who actually needs opioids for pain\". Pt states that at discharge he will call a medicaid cab to take him to the [facility] on [address] [facility]. Care Management Interventions PCP Verified by CM: Yes 
Palliative Care Criteria Met (RRAT>21 & CHF Dx)?: No 
Mode of Transport at Discharge: Other (see comment) Transition of Care Consult (CM Consult): Discharge Planning MyChart Signup: No 
Discharge Durable Medical Equipment: No 
Health Maintenance Reviewed: Yes Occupational Therapy Consult: No 
Speech Therapy Consult: No 
Current Support Network: Other Confirm Follow Up Transport: Family Plan discussed with Pt/Family/Caregiver: Yes [employer] Information Provided?: No 
Discharge Location Discharge Placement: Home

## 2019-01-04 ENCOUNTER — APPOINTMENT (OUTPATIENT)
Dept: GENERAL RADIOLOGY | Age: 59
DRG: 229 | End: 2019-01-04
Attending: HOSPITALIST
Payer: MEDICAID

## 2019-01-04 ENCOUNTER — APPOINTMENT (OUTPATIENT)
Dept: VASCULAR SURGERY | Age: 59
DRG: 229 | End: 2019-01-04
Attending: SINGLE SPECIALTY
Payer: MEDICAID

## 2019-01-04 PROBLEM — M86.9 OSTEOMYELITIS OF LEFT FOOT (HCC): Status: ACTIVE | Noted: 2019-01-04

## 2019-01-04 LAB
BASOPHILS # BLD: 0.2 K/UL (ref 0–0.1)
BASOPHILS NFR BLD: 2 % (ref 0–2)
DIFFERENTIAL METHOD BLD: ABNORMAL
EOSINOPHIL # BLD: 1 K/UL (ref 0–0.4)
EOSINOPHIL NFR BLD: 11 % (ref 0–5)
ERYTHROCYTE [DISTWIDTH] IN BLOOD BY AUTOMATED COUNT: 13.6 % (ref 11.6–14.5)
FOLATE BLD-MCNC: 392.4 NG/ML
FOLATE RBC-MCNC: 1448 NG/ML
HCT VFR BLD AUTO: 21.1 % (ref 36–48)
HCT VFR BLD AUTO: 27.1 % (ref 37.5–51)
HGB BLD-MCNC: 7 G/DL (ref 13–16)
LEFT ABI: 0.99
LEFT ANTERIOR TIBIAL: 164 MMHG
LEFT ARM BP: 165 MMHG
LEFT POSTERIOR TIBIAL: 159 MMHG
LYMPHOCYTES # BLD: 1.8 K/UL (ref 0.9–3.6)
LYMPHOCYTES NFR BLD: 21 % (ref 21–52)
MCH RBC QN AUTO: 30.2 PG (ref 24–34)
MCHC RBC AUTO-ENTMCNC: 33.2 G/DL (ref 31–37)
MCV RBC AUTO: 90.9 FL (ref 74–97)
MONOCYTES # BLD: 0.6 K/UL (ref 0.05–1.2)
MONOCYTES NFR BLD: 7 % (ref 3–10)
NEUTS SEG # BLD: 5.2 K/UL (ref 1.8–8)
NEUTS SEG NFR BLD: 59 % (ref 40–73)
PLATELET # BLD AUTO: 238 K/UL (ref 135–420)
PMV BLD AUTO: 10.3 FL (ref 9.2–11.8)
RBC # BLD AUTO: 2.32 M/UL (ref 4.7–5.5)
RIGHT ABI: 1.04
RIGHT ANTERIOR TIBIAL: 172 MMHG
RIGHT POSTERIOR TIBIAL: 153 MMHG
RIGHT TBI: 0.79
RIGHT TOE PRESSURE: 130 MMHG
WBC # BLD AUTO: 8.7 K/UL (ref 4.6–13.2)

## 2019-01-04 PROCEDURE — 74011250636 HC RX REV CODE- 250/636: Performed by: HOSPITALIST

## 2019-01-04 PROCEDURE — 74011250637 HC RX REV CODE- 250/637: Performed by: HOSPITALIST

## 2019-01-04 PROCEDURE — 74011000258 HC RX REV CODE- 258: Performed by: EMERGENCY MEDICINE

## 2019-01-04 PROCEDURE — 85025 COMPLETE CBC W/AUTO DIFF WBC: CPT

## 2019-01-04 PROCEDURE — 74011250637 HC RX REV CODE- 250/637: Performed by: NURSE PRACTITIONER

## 2019-01-04 PROCEDURE — 93922 UPR/L XTREMITY ART 2 LEVELS: CPT

## 2019-01-04 PROCEDURE — 36430 TRANSFUSION BLD/BLD COMPNT: CPT

## 2019-01-04 PROCEDURE — C9113 INJ PANTOPRAZOLE SODIUM, VIA: HCPCS | Performed by: EMERGENCY MEDICINE

## 2019-01-04 PROCEDURE — 30233N1 TRANSFUSION OF NONAUTOLOGOUS RED BLOOD CELLS INTO PERIPHERAL VEIN, PERCUTANEOUS APPROACH: ICD-10-PCS | Performed by: HOSPITALIST

## 2019-01-04 PROCEDURE — 73630 X-RAY EXAM OF FOOT: CPT

## 2019-01-04 PROCEDURE — 74011250636 HC RX REV CODE- 250/636: Performed by: EMERGENCY MEDICINE

## 2019-01-04 PROCEDURE — 65660000000 HC RM CCU STEPDOWN

## 2019-01-04 PROCEDURE — 36415 COLL VENOUS BLD VENIPUNCTURE: CPT

## 2019-01-04 PROCEDURE — P9016 RBC LEUKOCYTES REDUCED: HCPCS

## 2019-01-04 RX ORDER — OXYCODONE AND ACETAMINOPHEN 5; 325 MG/1; MG/1
1 TABLET ORAL
Status: DISCONTINUED | OUTPATIENT
Start: 2019-01-04 | End: 2019-01-04

## 2019-01-04 RX ORDER — HYDROCODONE BITARTRATE AND ACETAMINOPHEN 5; 325 MG/1; MG/1
1 TABLET ORAL
Status: DISCONTINUED | OUTPATIENT
Start: 2019-01-04 | End: 2019-01-07

## 2019-01-04 RX ORDER — DEXTROMETHORPHAN HYDROBROMIDE, GUAIFENESIN 5; 100 MG/5ML; MG/5ML
650 LIQUID ORAL EVERY 8 HOURS
Status: DISCONTINUED | OUTPATIENT
Start: 2019-01-04 | End: 2019-01-07

## 2019-01-04 RX ORDER — METOPROLOL SUCCINATE 25 MG/1
12.5 TABLET, EXTENDED RELEASE ORAL DAILY
Status: DISCONTINUED | OUTPATIENT
Start: 2019-01-05 | End: 2019-01-07

## 2019-01-04 RX ORDER — OXYCODONE AND ACETAMINOPHEN 7.5; 325 MG/1; MG/1
1 TABLET ORAL
Status: DISCONTINUED | OUTPATIENT
Start: 2019-01-04 | End: 2019-01-07

## 2019-01-04 RX ORDER — SODIUM CHLORIDE 9 MG/ML
250 INJECTION, SOLUTION INTRAVENOUS AS NEEDED
Status: DISCONTINUED | OUTPATIENT
Start: 2019-01-04 | End: 2019-01-11 | Stop reason: HOSPADM

## 2019-01-04 RX ADMIN — Medication 10 ML: at 22:26

## 2019-01-04 RX ADMIN — HYDROCODONE BITARTRATE AND ACETAMINOPHEN 1 TABLET: 5; 325 TABLET ORAL at 09:26

## 2019-01-04 RX ADMIN — SODIUM CHLORIDE 8 MG/HR: 900 INJECTION INTRAVENOUS at 09:49

## 2019-01-04 RX ADMIN — SODIUM CHLORIDE 75 ML/HR: 900 INJECTION, SOLUTION INTRAVENOUS at 06:10

## 2019-01-04 RX ADMIN — Medication 10 ML: at 14:22

## 2019-01-04 RX ADMIN — ACETAMINOPHEN 650 MG: 650 TABLET, FILM COATED, EXTENDED RELEASE ORAL at 22:25

## 2019-01-04 RX ADMIN — HYDROCODONE BITARTRATE AND ACETAMINOPHEN 1 TABLET: 5; 325 TABLET ORAL at 15:30

## 2019-01-04 RX ADMIN — OXYCODONE HYDROCHLORIDE AND ACETAMINOPHEN 1 TABLET: 7.5; 325 TABLET ORAL at 12:34

## 2019-01-04 RX ADMIN — ASPIRIN 325 MG ORAL TABLET 325 MG: 325 PILL ORAL at 08:38

## 2019-01-04 RX ADMIN — Medication 10 ML: at 13:37

## 2019-01-04 RX ADMIN — GABAPENTIN 1200 MG: 400 CAPSULE ORAL at 08:38

## 2019-01-04 RX ADMIN — Medication 20 ML: at 22:26

## 2019-01-04 RX ADMIN — OXYCODONE HYDROCHLORIDE AND ACETAMINOPHEN 1 TABLET: 7.5; 325 TABLET ORAL at 18:31

## 2019-01-04 RX ADMIN — GABAPENTIN 1200 MG: 400 CAPSULE ORAL at 17:29

## 2019-01-04 RX ADMIN — Medication 10 ML: at 13:38

## 2019-01-04 RX ADMIN — SODIUM CHLORIDE 8 MG/HR: 900 INJECTION INTRAVENOUS at 20:47

## 2019-01-04 RX ADMIN — ATORVASTATIN CALCIUM 40 MG: 40 TABLET, FILM COATED ORAL at 21:45

## 2019-01-04 RX ADMIN — GABAPENTIN 1200 MG: 400 CAPSULE ORAL at 21:46

## 2019-01-04 RX ADMIN — VENLAFAXINE HYDROCHLORIDE 150 MG: 150 CAPSULE, EXTENDED RELEASE ORAL at 08:38

## 2019-01-04 RX ADMIN — HYDROCODONE BITARTRATE AND ACETAMINOPHEN 1 TABLET: 5; 325 TABLET ORAL at 21:47

## 2019-01-04 RX ADMIN — SODIUM CHLORIDE 8 MG/HR: 900 INJECTION INTRAVENOUS at 06:10

## 2019-01-04 RX ADMIN — SODIUM CHLORIDE 8 MG/HR: 900 INJECTION INTRAVENOUS at 15:30

## 2019-01-04 NOTE — PROGRESS NOTES
Asked by MD to see if pain management can be set up. Per , Li Quintana, pain management has to be set up by PCP with documentation of pain for one year. Will cont to follow. Magali Monet RN,ext [phone].

## 2019-01-04 NOTE — ROUTINE PROCESS
1915: Assumed care. Awake. Resting in bed. No sob on RA. Denies any pain or discomfort at this time. Call light within reach. 2123: Due meds given. 0012: No change from previous assessment. 0230: Resting quietly in bed. 5874: No change from previous assessment. 3484: Slept good thru night. Needs attended. 4352: Bedside and Verbal shift change report given to Oliver Martinez Dr. (oncoming nurse) by me (offgoing nurse). Report included the following information SBAR, Kardex, Intake/Output, MAR and Recent Results.

## 2019-01-04 NOTE — PROGRESS NOTES
[facility]  -  Progress Note Patient: Andrew Madison MRN: [id number]  SSN: [id number] YOB: 1960  Age: 63 y.o. Sex: male Admit Date: 1/2/2019 I saw, evaluated, interviewed and examined the patient personally. I agree with the findings and plan of care as documented below with PA-C note S/P EGD showing severe ulcerative esophagitis PAF --> Now sinus this AM. Agree with ASA and plavix if ok with GI 
CHADS2 VASC score is 2 ( HTN and PAD) ECHO pending Would need to consider anticoagualtion eventually if ok with GI team however would wait as has severe ulcerative esophagitis with recent low H/H and transfusion. Changing amlodipine to Toprol XL 12.5 mg daily. Dandy Cisneros MD 
 
 
 
 
Assessment:  
 
- GI bleed, s/p EGD 1/3/18 that revealed severe ulcerative esophagitis from mid esophagus to distal esophagus s/p biopsy to evaluate for viral process and normal-appearing stomach and duodenum - Paroxysmal atrial fibrillation, CHADS2-Vasc score 1 (Vascular disease) - Echo 1/3/2019: EF 56-60%, no RWMA, age-appropriate diastolic function, mild mitral regurgitation - PAD with history of transmetatarsal amputation of the left foot - Osteomyelitis of L 1st metatarsal, podiatry assistance appreciated - Hypokalemia - Tobacco use Plan:  
 
-Rhythm this morning is sinus with PAC's. Would continue ASA. May consider low-dose BB.   
-H/H 7.0/21.1 this morning. 
-Pending left foot 1st metatarsal debridement with bone biopsy tomorrow per podiatry, appreciate assistance. Subjective:  
 
Reports lightheadedness with standing. C/o back and left foot pain. Objective:  
  
Patient Vitals for the past 8 hrs: 
 Temp Pulse Resp BP SpO2  
01/04/19 1321 97.4 °F (36.3 °C) 67 18 152/85 96 % 01/04/19 1251 97.8 °F (36.6 °C) 62 18 147/80 97 % 01/04/19 1221 97.7 °F (36.5 °C) 65 18 180/76 100 % 01/04/19 1206 97.5 °F (36.4 °C) 72 18 146/84 100 % 01/04/19 1151 98 °F (36.7 °C) 70 18 144/75 100 % 01/04/19 1132 97.6 °F (36.4 °C) 65 18 137/88 100 % 01/04/19 0817 98.6 °F (37 °C) 79 18 135/77 100 % Patient Vitals for the past 96 hrs: 
 Weight 01/03/19 0949 130 lb (59 kg) 01/03/19 0948 130 lb (59 kg) 01/02/19 0930 130 lb (59 kg) Intake/Output Summary (Last 24 hours) at 1/4/2019 1418 Last data filed at 1/4/2019 1342 Gross per 24 hour Intake 2743.58 ml Output 3550 ml Net -806.42 ml Physical Exam: 
General:  alert, cooperative, no distress, appears stated age Neck:  No JVD Lungs:  clear to auscultation bilaterally Heart:  Regular rhythm with premature beats Abdomen:  abdomen is soft without significant tenderness, masses, organomegaly or guarding Extremities:  Prior left transmetatarsal amputation, no appreciable edema Data Review:  
 
Labs: Results:  
   
Chemistry Recent Labs 01/03/19 
0415 01/02/19 2145 01/02/19 
1000 * 182* 129*  140 148* K 3.9 3.5 2.4*  
* 110* 120* CO2 24 22 15* BUN 38* 48* 39* CREA 0.79 0.95 0.93  
CA 8.0* 7.9* 5.4* MG 2.3  --  1.6 AGAP 6 8 13 BUCR 48* 51* 42* AP 85  --  74  
TP 5.3*  --  4.0* ALB 2.8*  --  2.0*  
GLOB 2.5  --  2.0 AGRAT 1.1  --  1.0  
  
CBC w/Diff Recent Labs 01/04/19 
0519 01/03/19 
0905 01/02/19 2145 01/02/19 
1000 WBC 8.7 15.1*  --  16.9*  
RBC 2.32* 2.68*  --  2.60* HGB 7.0* 8.3*  --  8.1* HCT 21.1* 24.5* 27.1* 23.9*  
 265  --  236 GRANS 59 76*  --  88* LYMPH 21 11*  --  6*  
EOS 11* 6*  --  0 Coagulation Recent Labs 01/02/19 
1000 PTP 20.1* INR 1.7* APTT 35.3 Liver Enzymes Recent Labs 01/03/19 
3659 TP 5.3* ALB 2.8* AP 85 SGOT 17

## 2019-01-04 NOTE — PROGRESS NOTES
Chart reviewed. [facility] [address] [employer], will need [facility] cab ride. Will cont to follow for further needs. Magali Monet RN,ext [phone].

## 2019-01-04 NOTE — PROGRESS NOTES
Progress Note Patient: Dasia Pleitez               Sex: male          DOA: 1/2/2019 YOB: 1960      Age:  63 y.o.        LOS:  LOS: 2 days CHIEF COMPLAINT: Rapid Heart Rate and Lethargy Subjective:  
 
Moved out of ICU. Pt had EGD yesterday which showed gastric ulcerations. He complains of LH when standing up. Pt c/o chronic pain involving his neck back, and left foot. States no doctor is giving him pain medicine outpatient. Objective:  
  
Visit Vitals /76 Pulse 65 Temp 97.7 °F (36.5 °C) Resp 18 Ht 6' 2\" (1.88 m) Wt 59 kg (130 lb) SpO2 100% BMI 16.69 kg/m² Physical Exam: 
Gen:  No distress, alert HEENT:  Normal cephalic atraumatic, extra-occular movements are intact. Neck:  Supple, No JVD Lungs:  Clear bilaterally, no wheeze, no rales, normal effort Heart:  Irregularly irregular rhythm, normal S1 and S2, no edema Abdomen:  Soft, non tender, normal bowel sounds, no guarding. Extremities:  Well perfused, no cyanosis or edema. Left foot wrapped in ACE wrap Neurological:  Awake and alert, CN's are intact, normal strength throughout extremities Skin:  No rashes or moles Mental Status:  Normal thought process, does not appear anxious Lab/Data Reviewed: All lab results for the last 24 hours reviewed. XR FOOT LT MIN 3 V Final Result IMPRESSION:  
  
  
1. Transmetatarsal indication of the left forefoot with subtle osseous erosions  
involving the distal margin of the first metatarsal head compatible with  
osteomyelitis. 2. No evidence of fracture or retained radiopaque foreign object. XR CHEST PORT Final Result IMPRESSION:  
PICC line in reasonable position. No active disease. XR CHEST PORT Final Result IMPRESSION:  
  
No active cardiopulmonary disease. Assessment/Plan Principal Problem: 
  GI bleed (1/2/2019) Active Problems: 
  A-fib ([address] (1/2/2019) Hypocalcemia (1/2/2019) Hypokalemia (1/2/2019) PAD (peripheral artery disease) ([facility] (1/2/2019) Acute blood loss anemia (1/2/2019) Osteomyelitis of left foot ([facility] [address] [facility] (1/4/2019) Overview: 1st metatarsal head Plan: 
Hgb 7.0,  Pt is lightheaded. Will give 2 units pRBC transfusion EGD shows severe ulcerative esophagitis, biopsies to evaluate for viral process IV protonix Cardizem drip discontinued by Cardiology Echo shows normal LVEF 
D/w Cardiology, OK to hold plavix for EGD Consult Podiatry for left 1st MTP osteomyelitis, planned for OR tomorrow AM 
Chronic pain - pt wants referral to pain medicine, discussed with CM, said PCP has to make referral 
DVT prophylaxis - hold chemoprophylaxis given UGIB. SCDs ordered

## 2019-01-04 NOTE — WOUND CARE
Wound/Ostomy Nurse Progress Note Patient: Cam May NVZ:[birth year] MRN: [id number] Situation: IP wound care consulted for Left Foot Wound Background: Per patient, he underwent an amputation 3/15/18 at [facility].  On 3/16/18 he vascular intervention (bypass) to restore blood flow. He reporeted, his foot had healed and in magnify360" he had to do some walking and it re-opened. He says he has been cleaning it with hydrogen peroxide and using Iodosorb. Patient says when he has been to follow up visits, they have had to cut back some of the black skin because it keeps coming back. Assessment: Left Foot amputation site open to air at time of consult. Wound site cleaned with Dermal Wound cleanser. Following wound cleansing, mild foul odor was present. Wound measures 6.9 x 5 x 1 with undermining present from 1-11oc'clock with the greatest measuring 2cm. Eschar present on sides of wounds with yellow tissue visible within wound base. Recommendation: Wound was dressed with Saline moist Mesalt and secured with Kerlix. Discussed with Dr. Marychuy Leblanc, he will place podiatry consult.

## 2019-01-04 NOTE — PROGRESS NOTES
Planned OR for tomorrow on hold until early next week due to new procedures/case being non emergent. Dc NPO Continue LWC with betadine and a DSD KETURAH - pending Continue abx [id number] Princess Grant for patient to be ambulatory in post op shoe at this time

## 2019-01-04 NOTE — ADT AUTH CERT NOTES
Emergency Contact(s) Name Relation Home Work Mobile Yessy Pac [phone] Ronal Toribio Sister [phone] Utilization Reviews Joanne Pinto (1/4/2019) by Kai Ball, HUNTER Review Status Review Entered In Primary 1/4/2019 11:25 Criteria Review REVIEW SUMMARY 
  
Patient: Mia Lepe Review Number: [id number] Review Status: In Primary 
  
Condition Specific: Yes 
  
Condition Level Of Care Code: ACUTE Condition Level Of Care Description: Acute 
  
  
OUTCOMES Outcome Type: Primary 
  
  
  
REVIEW DETAILS 
  
Service Date: 01/04/2019 Admit Date: 01/02/2019 Product: Barnes Bold Adult Subset: Anemia/Bleeding 
    (Symptom or finding within 24h) 
  (Excludes PO medications unless noted) [X] Select Day, One: 
            [X] Episode Day 1, One: 
                [X] ACUTE, >= One: 
                    [X] Upper GI non-variceal bleeding, All: 
                        [X] Upper GI non-variceal bleeding [X] Hematemesis or melena, Both: 
                            [X] Hematemesis or melena [X] Finding, >= One: 
                            ~--Admin, IQ Admin Admin on 01- 11:25 AM--~ 
                            NO MD PN 11:22 1/4/19 HG 7 
                            HCT 21.1 [X] Hct < 25%(0.25) or Hb < 8.3 g/dL(83 g/L)                         [X] Intervention, All: 
                        ~--Admin, IQ Admin Admin on 01- 11:25 AM--~ 
                        NS 75ML/HR IV 
                        PROTONIX GTT 
                        2 UNITS PRBCS 
                         
                         
                         
 [X] Hct or Hb monitoring at least 2x/24h 
                            [X] IV fluid or blood product transfusion [X] Proton pump inhibitor, administered or contraindicated 
  
Version: [employer]® 2018.1 Hayder Lowe  © 2018 [employer] and/or one of its [place]. All Rights Reserved. CPT only © 2017 American Medical Association. All Rights Reserved. Additional Notes Needs MD note added when available for 1/4/19 Shani Kb (1/3/2019) by Emmanuelle Verdin RN Review Status Review Entered In Primary 1/4/2019 11:18 Criteria Review REVIEW SUMMARY 
  
Patient: Sterling Moran Review Number: [id number] Review Status: In Primary 
  
Condition Specific: Yes 
  
Condition Level Of Care Code: CRITICAL Condition Level Of Care Description: Critical 
  
  
OUTCOMES Outcome Type: Primary 
  
  
  
REVIEW DETAILS 
  
Service Date: 01/03/2019 Admit Date: 01/02/2019 Product: Kimberley Hansen Adult Subset: Arrhythmia 
    (Symptom or finding within 24h) 
  (Excludes PO medications unless noted) [X] Select Day, One: 
            [X] Episode Day 2, One: 
                [X] INTERMEDIATE, One: 
                    [X] Partial Responder, not clinically stable for discharge and requires continued stay, >= One: 
                        [X] Atrial or ventricular arrhythmia on admission, Both: 
                            [X] Continuous cardiac monitoring (excludes Holter) 
                            ~--Admin, IQ Admin Admin on 01- 11:18 AM--~ 
                            PULMONARY 1/3/19 Assessment Upper GI bleeding A. fib with rapid ventricular response Tobacco abuse Plan EGD 
                            Treatment of A. fib with RVR per cardiology Encourage tobacco cessation CARDIOLOGY 1/3/19 Patient seen and examined independently. Patient presented in AF with GI bleed. He has converted to sinus overnight . Will continue IV Cardizem through procedure this morning and DC later today. Will review Echocardiogram. Preliminary EF normal. Agree with assessment and plan as noted below. Leonor Booth MD 
                            - No cardiac contraindication to proceed with EGD as planned. - Patient currently in sinus rhythm, discussed with RN to titrate to a lower rate and eventually discontinue. If ok with GI team, he can go to procedure on Cardizem drip as he has been going in and out of afib and sinus  
                            - Echo completed today, report to follow EGD 1/3/19 Procedure: Esophagogastroduodenoscopy with biopsy IMPRESSION:  
                            1. Severe ulcerative esophagitis from mid esophagus to distal esophagus status post biopsy to evaluate for viral process. 2. Moderate size hiatal hernia. 3. Normal appearing stomach. 4. Normal appearing duodenum. RECOMMENDATIONS: 
                            1. Resume regular diet. 2. Continue pantoprazole 3. Full liquid diet. IM PN 1/3/19 Pt denies complaints. Converted to sinus. Going for EGD today. Plan: 
                            Replace electrolytes per ICU protocol, improved IV protonix for UGIB. H&H stable EGD today Cont cardizem gtt for rate control, Cardiology consulted Echo read pending D/w Cardiology, OK to hold plavix for EGD 
                            DVT prophylaxis - hold chemoprophylaxis given UGIB. SCDs ordered WBC 15.1 HG 8.3 HCT 24.5 BUN 38 
                            ·          ECHO: Estimated left ventricular ejection fraction is 56 - 60%. Visually measured ejection fraction. Normal left ventricular wall motion, no regional wall motion abnormality noted. Age-appropriate left ventricular diastolic function. ·          Mitral valve non-specific thickening. Mild mitral valve regurgitation. ·          Mildly elevated central venous pressure (5-10 mmHg); IVC diameter is less than 21 mm and collapses less than 50% with respiration. Mild aortic valve sclerosis with no evidence of reduced excursion 112/60 
                            98.7F 
                            77HR 
                            18R 
                            95%RA [X] Rate or rhythm control, One: 
                                [X] IV antiarrhythmic and hemodynamically stable ~--Admin,  [facility] on 01- 11:17 AM--~ 
                                ·      Estimated left ventricular ejection fraction is 56 - 60%. Visually measured ejection fraction. Normal left ventricular wall motion, no regional wall motion abnormality noted. Age-appropriate left ventricular diastolic function. ·      Mitral valve non-specific thickening. Mild mitral valve regurgitation. ·      Mildly elevated central venous pressure (5-10 mmHg); IVC diameter is less than 21 mm and collapses less than 50% with respiration. ·      Mild aortic valve sclerosis with no evidence of reduced excursion 
                                 
                                 
                                 
  
Version: [employer]® 2018.1 Anna Marie Locke  © 2018 [employer] and/or one of its [place]. All Rights Reserved. CPT only © 2017 American Medical Association. All Rights Reserved.

## 2019-01-04 NOTE — PROGRESS NOTES
0800 received pt, resting in bed, slightly demanding with requests, oriented x4, tma to left foot, pt in pain and complaining he has had no pain meds, needs wound care to see him and is complaining about that, upper right arm PICC, protonix gtt and NS ivf at 75.  
 
0900 spoke with dr Maty Vogel, pt's H&H is 7.0and 21.1, he said he will order a unit of PRBC. Also asked dr for pain med for pt, he said he will order something for pain. 0930 assisted pt with phone number for [facility], he had appt two days ago he did not make, they asked for xray of foot and to please send to them, will inform dr Maty Vogel. 1000 wound care in to see pt, wound will be dressed by her, Slade Bradford. 1152 first unit of blood started, pt made aware of signs of reaction. Stopped NS ivf but dr Maty Vogel wanted protonix gtt to continue. 1221 rate change to blood transfusion from 75 to 125. Pt is tolerating blood well, no complaints 1418 first unit of blood finished, no reaction noted, pt feels \"well\" 1551 2nd unit of blood started. Pt made aware of signs of reaction again. 1900 2nd unit of blood finished, no signs of reaction noted, pt feels \"better\". NS ivf restarted at 75ml/hr. 1915 Bedside and Verbal shift change report given to Edwardo Tnaner RN (oncoming nurse) by Vania Mitchell RN 
 (offgoing nurse). Report included the following information Kardex, MAR and Recent Results.

## 2019-01-04 NOTE — PROGRESS NOTES
Problem: Falls - Risk of 
Goal: *Absence of Falls Document Bishop Moreno Fall Risk and appropriate interventions in the flowsheet. Outcome: Progressing Towards Goal 
Fall Risk Interventions: 
Mobility Interventions: Patient to call before getting OOB Medication Interventions: Teach patient to arise slowly, Patient to call before getting OOB Elimination Interventions: Call light in reach, Urinal in reach History of Falls Interventions: Door open when patient unattended, Room close to nurse's station

## 2019-01-04 NOTE — PROGRESS NOTES
Problem: Falls - Risk of 
Goal: *Absence of Falls Document Artur Seo Fall Risk and appropriate interventions in the flowsheet. Outcome: Progressing Towards Goal 
Fall Risk Interventions: 
Mobility Interventions: Patient to call before getting OOB Medication Interventions: Teach patient to arise slowly, Patient to call before getting OOB Elimination Interventions: Call light in reach, Urinal in reach History of Falls Interventions: Door open when patient unattended, Room close to nurse's station

## 2019-01-04 NOTE — PROGRESS NOTES
Gastrointestinal Progress Note Patient Name: Ashlyn Jaquez Today's Date: 1/4/2019 Admit Date: 1/2/2019 Assessment-Recommendation: 1. GIB. Severe esophagitis. Continue current medications. Monitor H&H. Advance diet as tolerated. Subjective:  
 
Denies nausea or vomiting. Current Facility-Administered Medications Medication Dose Route Frequency  HYDROcodone-acetaminophen (NORCO) 5-325 mg per tablet 1 Tab  1 Tab Oral Q6H PRN  
 acetaminophen (TYLENOL) SR tablet 650 mg  650 mg Oral Q8H  
 0.9% sodium chloride infusion 250 mL  250 mL IntraVENous PRN  
 oxyCODONE-acetaminophen (PERCOCET 7.5) 7.5-325 mg per tablet 1 Tab  1 Tab Oral Q6H PRN  
 [START ON 1/5/2019] metoprolol succinate (TOPROL-XL) XL tablet 12.5 mg  12.5 mg Oral DAILY  gabapentin (NEURONTIN) capsule 1,200 mg  1,200 mg Oral TID  venlafaxine-SR (EFFEXOR-XR) capsule 150 mg  150 mg Oral DAILY WITH BREAKFAST  pantoprazole (PROTONIX) 40 mg in 0.9% sodium chloride (MBP/ADV) 50 mL MBP  8 mg/hr IntraVENous CONTINUOUS  
 0.9% sodium chloride infusion  75 mL/hr IntraVENous CONTINUOUS  
 sodium chloride (NS) flush 5-10 mL  5-10 mL IntraVENous Q8H  
 sodium chloride (NS) flush 5-10 mL  5-10 mL IntraVENous PRN  
 sodium chloride (NS) flush 10-30 mL  10-30 mL InterCATHeter PRN  
 sodium chloride (NS) flush 10 mL  10 mL InterCATHeter Q24H  
 sodium chloride (NS) flush 10 mL  10 mL InterCATHeter PRN  
 sodium chloride (NS) flush 10-40 mL  10-40 mL InterCATHeter Q8H  
 bacitracin 500 unit/gram packet 1 Packet  1 Packet Topical PRN  
 atorvastatin (LIPITOR) tablet 40 mg  40 mg Oral QHS  aspirin tablet 325 mg  325 mg Oral DAILY Objective:  
 
Physical Exam: 
NAD 
OP clear CV RRR Abdomen soft NT ND Right Foot with necrosis Data Review: 
 
Labs: Results:  
   
Chemistry Recent Labs 01/03/19 
0415 01/02/19 
2145 01/02/19 
1000 * 182* 129*  140 148* K 3.9 3.5 2.4*  
* 110* 120* CO2 24 22 15* BUN 38* 48* 39* CREA 0.79 0.95 0.93  
CA 8.0* 7.9* 5.4* AGAP 6 8 13 BUCR 48* 51* 42* AP 85  --  74  
TP 5.3*  --  4.0* ALB 2.8*  --  2.0*  
GLOB 2.5  --  2.0 AGRAT 1.1  --  1.0  
  
CBC w/Diff Recent Labs 01/04/19 
0519 01/03/19 
0905 01/02/19 
2145 01/02/19 
1000 WBC 8.7 15.1*  --  16.9*  
RBC 2.32* 2.68*  --  2.60* HGB 7.0* 8.3*  --  8.1* HCT 21.1* 24.5* 27.1* 23.9*  
 265  --  236 GRANS 59 76*  --  88* LYMPH 21 11*  --  6*  
EOS 11* 6*  --  0 RETIC  --   --  2.6*  --   
  
Coagulation Recent Labs 01/02/19 
1000 PTP 20.1* INR 1.7* APTT 35.3 Liver Enzymes Recent Labs 01/03/19 
9435 TP 5.3* ALB 2.8* AP 85 SGOT 17 ALT 26 Jasmina Schmidt MD 
January 4, 2019

## 2019-01-04 NOTE — CONSULTS
Podiatry History and Physical    Subjective: Patient is a 63year old male who presents to [place] [address] for worsening left foot infection. Patient states in March of last year he had a TMA done at [facility]. He states that the area had healed but approx one month ago he had to move and was up on his foot for a long period of time. He states since then the wound has been open and draining. He denies any fever chills nausea or vomiting. He has no other pedal complaints at this time. Date of Consultation:  January 4, 2019    Referring Physician: Dr. Gali Godinez      Patient Active Problem List    Diagnosis Date Noted    Osteomyelitis of left foot ([facility] 01/04/2019    A-fib ([address] 01/02/2019    GI bleed 01/02/2019    Hypocalcemia 01/02/2019    Hypokalemia 01/02/2019    PAD (peripheral artery disease) ([facility] 01/02/2019    Acute blood loss anemia 01/02/2019    Toe amputation status, left ([facility] 05/18/2018    PVD (peripheral vascular disease) ([facility] [facility] 05/18/2018    ERRONEOUS ENCOUNTER--DISREGARD 05/14/2018     Past Medical History:   Diagnosis Date    Calculus of kidney     Depression     Hypertension     Sleep disorder     Vascular disorder of lower extremity     bilateral      Family History   Problem Relation Age of Onset    Arthritis-osteo Mother     Diabetes Father     Heart Disease Father     Melanoma Father     Psychiatric Disorder Brother       Social History     Tobacco Use    Smoking status: Current Every Day Smoker     Packs/day: 0.25    Smokeless tobacco: Never Used   Substance Use Topics    Alcohol use: Yes     Past Surgical History:   Procedure Laterality Date    HX AMPUTATION TOE Left     x 5 toes    HX HEENT      VASCULAR SURGERY PROCEDURE UNLIST        Prior to Admission medications    Medication Sig Start Date End Date Taking?  Authorizing Provider   aspirin (ASPIRIN) 325 mg tablet 325 mg. 6/1/18  Yes Provider, Historical   atorvastatin (LIPITOR) 40 mg tablet 40 mg. 6/1/18  Yes Provider, Historical   clopidogrel (PLAVIX) 75 mg tab 150 mg daily. 6/1/18  Yes Provider, Historical   venlafaxine-SR (EFFEXOR-XR) 150 mg capsule Take 1 Cap by mouth daily. Indications: ANXIETY WITH DEPRESSION  Patient taking differently: Take 150 mg by mouth two (2) times a day. 6/8/18  Yes Rloand Calix MD   gabapentin (NEURONTIN) 100 mg capsule Take 1 Cap by mouth three (3) times daily. Patient taking differently: Take 1,200 mg by mouth three (3) times daily. 6/8/18  Yes Roland Calix MD   amLODIPine (NORVASC) 10 mg tablet Take 1 Tab by mouth daily. Indications: hypertension 5/18/18  Yes Roland Calix MD   HYDROcodone-acetaminophen [facility]) 5-325 mg per tablet Take 1 Tab by mouth every six (6) hours as needed for Pain. Max Daily Amount: 4 Tabs. 6/30/18   Cesia Chin, NP   potassium chloride SR (KLOR-CON 10) 10 mEq tablet Take 2 Tabs by mouth daily. Indications: hypokalemia 6/10/18   Pia Crespo MD   nicotine (NICODERM CQ) 21 mg/24 hr 1 Patch. 6/1/18   Provider, Historical   therapeutic multivitamin-minerals (THERAGRAN-M) tablet Take 1 Tab by Mouth Once a Day. 3/16/18   Provider, Historical   melatonin 3 mg tablet Take  by mouth. Provider, Historical   polyethylene glycol (MIRALAX) 17 gram packet Take 17 g by mouth daily. Provider, Historical   (No Medication Selected) 1 Tab two (2) times a day.  Morphine sulfate  15 mg    Provider, Historical     No Known Allergies     Review of Systems:    Constitutional: negative  Eyes: negative  Ears, nose, mouth, throat, and face: negative  Respiratory: negative  Cardiovascular: negative  Gastrointestinal: negative  Hematologic/lymphatic: negative  Musculoskeletal:negative  Neurological: negative  Endocrine: negative      Objective:     Patient Vitals for the past 8 hrs:   BP Temp Pulse Resp SpO2   01/04/19 1251 147/80 97.8 °F (36.6 °C) 62 18 97 %   01/04/19 1221 180/76 97.7 °F (36.5 °C) 65 18 100 %   01/04/19 1206 146/84 97.5 °F (36.4 °C) 72 18 100 %   01/04/19 1151 144/75 98 °F (36.7 °C) 70 18 100 %   19 1132 137/88 97.6 °F (36.4 °C) 65 18 100 %   19 0817 135/77 98.6 °F (37 °C) 79 18 100 %     Temp (24hrs), Av.1 °F (36.7 °C), Min:97.5 °F (36.4 °C), Max:98.9 °F (37.2 °C)    Foot Exam:  Vascular:   DP/PT pulses non palpable. Skin temp warm to cool from proximal to distal.     Neuro:   epicritic sensation absent    Derm:   Left foot s/p TMA site present with large ulcer present over the medial aspect of the site. Necrotic and non viable tissue present to the tonja wound. Malodor present. Mild erythema present localized to the tonja wound area. No ascending lymphangitis. No pain on palpation. Musculoskeltal:   S/p TMA left foot      Data Review:   Recent Results (from the past 24 hour(s))   CBC WITH AUTOMATED DIFF    Collection Time: 19  5:19 AM   Result Value Ref Range    WBC 8.7 4.6 - 13.2 K/uL    RBC 2.32 (L) 4.70 - 5.50 M/uL    HGB 7.0 (L) 13.0 - 16.0 g/dL    HCT 21.1 (L) 36.0 - 48.0 %    MCV 90.9 74.0 - 97.0 FL    MCH 30.2 24.0 - 34.0 PG    MCHC 33.2 31.0 - 37.0 g/dL    RDW 13.6 11.6 - 14.5 %    PLATELET 982 085 - 770 K/uL    MPV 10.3 9.2 - 11.8 FL    NEUTROPHILS 59 40 - 73 %    LYMPHOCYTES 21 21 - 52 %    MONOCYTES 7 3 - 10 %    EOSINOPHILS 11 (H) 0 - 5 %    BASOPHILS 2 0 - 2 %    ABS. NEUTROPHILS 5.2 1.8 - 8.0 K/UL    ABS. LYMPHOCYTES 1.8 0.9 - 3.6 K/UL    ABS. MONOCYTES 0.6 0.05 - 1.2 K/UL    ABS. EOSINOPHILS 1.0 (H) 0.0 - 0.4 K/UL    ABS. BASOPHILS 0.2 (H) 0.0 - 0.1 K/UL    DF AUTOMATED           Impression:   Osteomyelitis of the left first metatarsal    Recommendation:   Patient seen this afternoon. Labs and x rays reviewed  Discussed in depth with the patient the dx and treatment options. We discussed that without surgical intervention the infection could possibly spread further up the leg. Patient demonstrates understanding. Patient scheduled for OR tomorrow 8am  for left foot first met debridement with bone biopsy.    Will plan to take intraop cultures. Continue [address] for now with betadine wet to dry dressing. Patient will need to be NPO after midnight tonight  Hold all anticoagulants after midnight tonight. Consent reviewed, signed and is in the patients chart. All risks and benefits were discussed with the patient and all questions answered. No guarantees were made as to the outcome of the procedure. Patient is aware that they are at an increase risk for further limb loss. Continue abx  Thank for you the opportunity for us to assist with the care of this patient. Greater than 30 minutes was spent face to face with the patient.

## 2019-01-04 NOTE — PROGRESS NOTES
Problem: Pressure Injury - Risk of 
Goal: *Prevention of pressure injury Document Hal Scale and appropriate interventions in the flowsheet. Outcome: Progressing Towards Goal 
Pressure Injury Interventions: 
Sensory Interventions: Keep linens dry and wrinkle-free, Pressure redistribution bed/mattress (bed type), Use 30-degree side-lying position Activity Interventions: Pressure redistribution bed/mattress(bed type) Mobility Interventions: HOB 30 degrees or less, Pressure redistribution bed/mattress (bed type) Nutrition Interventions: Document food/fluid/supplement intake

## 2019-01-05 LAB
ABO + RH BLD: NORMAL
ANION GAP SERPL CALC-SCNC: 8 MMOL/L (ref 3–18)
BASOPHILS # BLD: 0.2 K/UL (ref 0–0.1)
BASOPHILS NFR BLD: 3 % (ref 0–2)
BLD PROD TYP BPU: NORMAL
BLD PROD TYP BPU: NORMAL
BLOOD GROUP ANTIBODIES SERPL: NORMAL
BPU ID: NORMAL
BPU ID: NORMAL
BUN SERPL-MCNC: 10 MG/DL (ref 7–18)
BUN/CREAT SERPL: 11 (ref 12–20)
CALCIUM SERPL-MCNC: 7.8 MG/DL (ref 8.5–10.1)
CALLED TO:,BCALL1: NORMAL
CHLORIDE SERPL-SCNC: 107 MMOL/L (ref 100–108)
CO2 SERPL-SCNC: 27 MMOL/L (ref 21–32)
CREAT SERPL-MCNC: 0.91 MG/DL (ref 0.6–1.3)
CROSSMATCH RESULT,%XM: NORMAL
CROSSMATCH RESULT,%XM: NORMAL
DIFFERENTIAL METHOD BLD: ABNORMAL
EOSINOPHIL # BLD: 1 K/UL (ref 0–0.4)
EOSINOPHIL NFR BLD: 13 % (ref 0–5)
ERYTHROCYTE [DISTWIDTH] IN BLOOD BY AUTOMATED COUNT: 14.9 % (ref 11.6–14.5)
GLUCOSE SERPL-MCNC: 142 MG/DL (ref 74–99)
HCT VFR BLD AUTO: 26.7 % (ref 36–48)
HGB BLD-MCNC: 9 G/DL (ref 13–16)
LYMPHOCYTES # BLD: 2 K/UL (ref 0.9–3.6)
LYMPHOCYTES NFR BLD: 27 % (ref 21–52)
MCH RBC QN AUTO: 29.8 PG (ref 24–34)
MCHC RBC AUTO-ENTMCNC: 33.7 G/DL (ref 31–37)
MCV RBC AUTO: 88.4 FL (ref 74–97)
MONOCYTES # BLD: 0.5 K/UL (ref 0.05–1.2)
MONOCYTES NFR BLD: 7 % (ref 3–10)
NEUTS SEG # BLD: 3.8 K/UL (ref 1.8–8)
NEUTS SEG NFR BLD: 50 % (ref 40–73)
PLATELET # BLD AUTO: 198 K/UL (ref 135–420)
PMV BLD AUTO: 10.4 FL (ref 9.2–11.8)
POTASSIUM SERPL-SCNC: 3.7 MMOL/L (ref 3.5–5.5)
RBC # BLD AUTO: 3.02 M/UL (ref 4.7–5.5)
SODIUM SERPL-SCNC: 142 MMOL/L (ref 136–145)
SPECIMEN EXP DATE BLD: NORMAL
STATUS OF UNIT,%ST: NORMAL
STATUS OF UNIT,%ST: NORMAL
UNIT DIVISION, %UDIV: 0
UNIT DIVISION, %UDIV: 0
WBC # BLD AUTO: 7.5 K/UL (ref 4.6–13.2)

## 2019-01-05 PROCEDURE — 74011250637 HC RX REV CODE- 250/637: Performed by: INTERNAL MEDICINE

## 2019-01-05 PROCEDURE — 74011250637 HC RX REV CODE- 250/637: Performed by: HOSPITALIST

## 2019-01-05 PROCEDURE — 74011000258 HC RX REV CODE- 258: Performed by: EMERGENCY MEDICINE

## 2019-01-05 PROCEDURE — 74011250636 HC RX REV CODE- 250/636: Performed by: HOSPITALIST

## 2019-01-05 PROCEDURE — 80048 BASIC METABOLIC PNL TOTAL CA: CPT

## 2019-01-05 PROCEDURE — C9113 INJ PANTOPRAZOLE SODIUM, VIA: HCPCS | Performed by: HOSPITALIST

## 2019-01-05 PROCEDURE — C9113 INJ PANTOPRAZOLE SODIUM, VIA: HCPCS | Performed by: EMERGENCY MEDICINE

## 2019-01-05 PROCEDURE — 74011250636 HC RX REV CODE- 250/636: Performed by: EMERGENCY MEDICINE

## 2019-01-05 PROCEDURE — 65660000000 HC RM CCU STEPDOWN

## 2019-01-05 PROCEDURE — 36592 COLLECT BLOOD FROM PICC: CPT

## 2019-01-05 PROCEDURE — 74011250637 HC RX REV CODE- 250/637: Performed by: NURSE PRACTITIONER

## 2019-01-05 PROCEDURE — 85025 COMPLETE CBC W/AUTO DIFF WBC: CPT

## 2019-01-05 RX ORDER — PANTOPRAZOLE SODIUM 40 MG/1
40 TABLET, DELAYED RELEASE ORAL
Status: DISCONTINUED | OUTPATIENT
Start: 2019-01-05 | End: 2019-01-11 | Stop reason: HOSPADM

## 2019-01-05 RX ADMIN — ATORVASTATIN CALCIUM 40 MG: 40 TABLET, FILM COATED ORAL at 21:21

## 2019-01-05 RX ADMIN — ACETAMINOPHEN 650 MG: 650 TABLET, FILM COATED, EXTENDED RELEASE ORAL at 21:21

## 2019-01-05 RX ADMIN — Medication 10 ML: at 16:28

## 2019-01-05 RX ADMIN — OXYCODONE HYDROCHLORIDE AND ACETAMINOPHEN 1 TABLET: 7.5; 325 TABLET ORAL at 16:26

## 2019-01-05 RX ADMIN — SODIUM CHLORIDE 8 MG/HR: 900 INJECTION INTRAVENOUS at 02:27

## 2019-01-05 RX ADMIN — GABAPENTIN 1200 MG: 400 CAPSULE ORAL at 16:26

## 2019-01-05 RX ADMIN — PANTOPRAZOLE SODIUM 40 MG: 40 TABLET, DELAYED RELEASE ORAL at 16:26

## 2019-01-05 RX ADMIN — Medication 20 ML: at 21:22

## 2019-01-05 RX ADMIN — Medication 10 ML: at 03:36

## 2019-01-05 RX ADMIN — GABAPENTIN 1200 MG: 400 CAPSULE ORAL at 10:05

## 2019-01-05 RX ADMIN — ASPIRIN 325 MG ORAL TABLET 325 MG: 325 PILL ORAL at 10:05

## 2019-01-05 RX ADMIN — HYDROCODONE BITARTRATE AND ACETAMINOPHEN 1 TABLET: 5; 325 TABLET ORAL at 13:04

## 2019-01-05 RX ADMIN — OXYCODONE HYDROCHLORIDE AND ACETAMINOPHEN 1 TABLET: 7.5; 325 TABLET ORAL at 02:13

## 2019-01-05 RX ADMIN — ACETAMINOPHEN 650 MG: 650 TABLET, FILM COATED, EXTENDED RELEASE ORAL at 06:04

## 2019-01-05 RX ADMIN — HYDROCODONE BITARTRATE AND ACETAMINOPHEN 1 TABLET: 5; 325 TABLET ORAL at 03:45

## 2019-01-05 RX ADMIN — SODIUM CHLORIDE 75 ML/HR: 900 INJECTION, SOLUTION INTRAVENOUS at 13:07

## 2019-01-05 RX ADMIN — METOPROLOL SUCCINATE 12.5 MG: 25 TABLET, EXTENDED RELEASE ORAL at 10:05

## 2019-01-05 RX ADMIN — PANTOPRAZOLE SODIUM 40 MG: 40 INJECTION, POWDER, FOR SOLUTION INTRAVENOUS at 10:05

## 2019-01-05 RX ADMIN — SODIUM CHLORIDE 75 ML/HR: 900 INJECTION, SOLUTION INTRAVENOUS at 02:27

## 2019-01-05 RX ADMIN — Medication 20 ML: at 03:36

## 2019-01-05 RX ADMIN — OXYCODONE HYDROCHLORIDE AND ACETAMINOPHEN 1 TABLET: 7.5; 325 TABLET ORAL at 10:05

## 2019-01-05 RX ADMIN — VENLAFAXINE HYDROCHLORIDE 150 MG: 150 CAPSULE, EXTENDED RELEASE ORAL at 10:05

## 2019-01-05 RX ADMIN — GABAPENTIN 1200 MG: 400 CAPSULE ORAL at 21:21

## 2019-01-05 RX ADMIN — Medication 10 ML: at 21:22

## 2019-01-05 RX ADMIN — SODIUM CHLORIDE 8 MG/HR: 900 INJECTION INTRAVENOUS at 06:44

## 2019-01-05 RX ADMIN — OXYCODONE HYDROCHLORIDE AND ACETAMINOPHEN 1 TABLET: 7.5; 325 TABLET ORAL at 22:43

## 2019-01-05 RX ADMIN — HYDROCODONE BITARTRATE AND ACETAMINOPHEN 1 TABLET: 5; 325 TABLET ORAL at 19:07

## 2019-01-05 NOTE — PROGRESS NOTES
Progress Note Patient: Kana Odonnell MRN: [id number]  SSN: [id number] YOB: 1960  Age: 63 y.o. Sex: male Admit Date: 1/2/2019 2 Days Post-Op Procedure:   Procedure(s): ESOPHAGOGASTRODUODENOSCOPY (EGD) Subjective:  
 
Patient seen resting quiet and comfortably and no apparent distress. No overnight events Objective:  
 
Visit Vitals /86 (BP 1 Location: Left arm, BP Patient Position: At rest) Pulse 68 Temp 97.4 °F (36.3 °C) Resp 18 Ht 6' 2\" (1.88 m) Wt 59 kg (130 lb) SpO2 99% BMI 16.69 kg/m² Physical Exam: 
Left foot s/p TMA site present with large ulcer present over the medial aspect of the site. Necrotic and non viable tissue present to the tonja wound. Malodor present. Mild erythema present localized to the tonja wound area. No ascending lymphangitis. No pain on palpation. No drainage noted. NVS unchanged. Labs/Radiology Review: images and reports reviewed Assessment:  
 
Hospital Problems  Date Reviewed: 1/3/2019 Codes Class Noted POA Osteomyelitis of left foot (HCC) ICD-10-CM: M86.9 ICD-9-CM: 730.27  1/4/2019 Unknown Overview Signed 1/4/2019 12:55 PM by Demetrius Miranda MD  
  1st metatarsal head [facility]) ICD-10-CM: I48.91 
ICD-9-CM: 427.31  1/2/2019 Unknown * (Principal) GI bleed ICD-10-CM: K92.2 ICD-9-CM: 578.9  1/2/2019 Unknown Hypocalcemia ICD-10-CM: E83.51 
ICD-9-CM: 275.41  1/2/2019 Unknown Hypokalemia ICD-10-CM: E87.6 ICD-9-CM: 276.8  1/2/2019 Unknown PAD (peripheral artery disease) (HCC) ICD-10-CM: I73.9 ICD-9-CM: 443.9  1/2/2019 Unknown Acute blood loss anemia ICD-10-CM: D62 
ICD-9-CM: 285.1  1/2/2019 Unknown Plan/Recommendations/Medical Decision Making:  
Patient seen this morning on rounds Unable to take patient to OR this weekend secondary to OR new policy for emergent cases only. Patient is not septic at this time. Will need surgical debridement likely early next week Continue with betadine and a DSD changed daily Ok to ambulate in post op shoe PVL studies demonstrate: No evidence of significant arterial disease in the lower extremity bilaterally at rest, with the exception of isolated posterior tibial disease bilaterally Will order ESR and CRP- pending Greater than 20 minutes was spent with the patient and all questions answered. Signed By: Alfonso Young DPM   
 January 5, 2019

## 2019-01-05 NOTE — ROUTINE PROCESS
1930: Assumed care. Awake. HOB elevated. No sob on RA. Call light within reach. 2100: Late dinner box provided per patient request. 
 
2147: Due meds given. Medicated for pain per patient request. 
 
0008: No change from previous assessment. 7119: Medicated for pain per patient request. 
 
4586: No change from previous assessment. 0345: Medicated for pain per patient request. 
 
4921: Due med given. 0890: Slept on & off thru night. Needs attended. Patient ambulating in the hallway. 0740: Bedside and Verbal shift change report given to Marivel Riddle (oncoming nurse) by me (offgoing nurse). Report included the following information SBAR, Kardex, Intake/Output, MAR and Recent Results.

## 2019-01-05 NOTE — PROGRESS NOTES
PROGRESS NOTE PATIENT:  Bhumi Carreon MRN: [id number] [facility], [address] 
         1/5/2019, 12:27 PM 
   
I 
Mr. Claudette Pick is a 63 y.o. male who is being seen for Anemia, GI bleed SUBJECTIVE: 
Pt has no complaints. Tolerating diet. OBJECTIVE: 
Patient Vitals for the past 24 hrs: 
 Temp Pulse Resp BP SpO2  
01/05/19 0727 97.4 °F (36.3 °C) 68 18 167/86 99 % 01/05/19 0324 97.7 °F (36.5 °C) 66 18 137/69 99 % 01/05/19 0008 97.6 °F (36.4 °C) 66 18 133/69 100 % 01/04/19 2007 97.7 °F (36.5 °C) 70 18 134/54 97 % 01/04/19 1855 97.8 °F (36.6 °C) 69 18 178/89 97 % 01/04/19 1821 97.7 °F (36.5 °C) 65 18 146/78 96 % 01/04/19 1721 97.8 °F (36.6 °C) 71 18 148/78 96 % 01/04/19 1651 97.8 °F (36.6 °C) 71 18 (!) 169/105 98 % 01/04/19 1621 97.4 °F (36.3 °C) 71 18 150/78 98 % 01/04/19 1606 97.4 °F (36.3 °C) 76 18 166/86 98 % 01/04/19 1551 97.5 °F (36.4 °C) 63 18 156/81 96 % 01/04/19 1534 97.4 °F (36.3 °C) 69 18 166/79 97 % 01/04/19 1418 97.7 °F (36.5 °C) 69 18 154/85 97 % 01/04/19 1321 97.4 °F (36.3 °C) 67 18 152/85 96 % 01/04/19 1251 97.8 °F (36.6 °C) 62 18 147/80 97 % Intake/Output Summary (Last 24 hours) at 1/5/2019 1227 Last data filed at 1/5/2019 1003 Gross per 24 hour Intake 3786.93 ml Output 4025 ml Net -238.07 ml Gen: NAD Heent: No pallor, icterus Abd  : Soft, non tender, BS +, No masses felt. Labs: Results:  
Chemistry Recent Labs 01/05/19 
0330 01/03/19 
0415 01/02/19 
2145 * 117* 182*  143 140  
K 3.7 3.9 3.5  113* 110* CO2 27 24 22 BUN 10 38* 48* CREA 0.91 0.79 0.95  
CA 7.8* 8.0* 7.9* AGAP 8 6 8 BUCR 11* 48* 51* AP  --  85  --   
TP  --  5.3*  --   
ALB  --  2.8*  --   
GLOB  --  2.5  --   
AGRAT  --  1.1  --   
 Estimated Creatinine Clearance: 74.7 mL/min (based on SCr of 0.91 mg/dL). CBC w/Diff Recent Labs 01/05/19 
0330 01/04/19 
0519 01/03/19 
6011 WBC 7.5 8.7 15.1*  
 RBC 3.02* 2.32* 2.68* HGB 9.0* 7.0* 8.3* HCT 26.7* 21.1* 24.5*  
 238 265 GRANS 50 59 76* LYMPH 27 21 11* EOS 13* 11* 6* Cardiac Enzymes No results for input(s): CPK, CKND1, ULI in the last 72 hours. No lab exists for component: Will Berry Coagulation No results for input(s): PTP, INR, APTT in the last 72 hours. No lab exists for component: INREXT Hepatitis Panel No results found for: HAMAT, HAAB, HABT, HAAT, HBSAG, HBSB, HBSAT, HBABN, HBCM, HBCAB, HBCAT, Carol Mulch, HBEAB, [facility], [facility], [id number], [facility], [facility], [facility], [address] [facility], [id number], [id number], [address] [place] [address], [id number], [facility], [id number], HCGAT Amylase Lipase Liver Enzymes Recent Labs 01/03/19 
8851 TP 5.3* ALB 2.8* TBILI 0.3 AP 85 SGOT 17 ALT 26 Thyroid Studies No results for input(s): T4, T3U, TSH, TSHEXT in the last 72 hours. No lab exists for component: T3RU Pathology pathology No Known Allergies Current Facility-Administered Medications Medication Dose Route Frequency  pantoprazole (PROTONIX) 40 mg in sodium chloride 0.9% 10 mL injection  40 mg IntraVENous Q12H  
 HYDROcodone-acetaminophen (NORCO) 5-325 mg per tablet 1 Tab  1 Tab Oral Q6H PRN  
 acetaminophen (TYLENOL) SR tablet 650 mg  650 mg Oral Q8H  
 0.9% sodium chloride infusion 250 mL  250 mL IntraVENous PRN  
 oxyCODONE-acetaminophen (PERCOCET 7.5) 7.5-325 mg per tablet 1 Tab  1 Tab Oral Q6H PRN  
 metoprolol succinate (TOPROL-XL) XL tablet 12.5 mg  12.5 mg Oral DAILY  gabapentin (NEURONTIN) capsule 1,200 mg  1,200 mg Oral TID  venlafaxine-SR (EFFEXOR-XR) capsule 150 mg  150 mg Oral DAILY WITH BREAKFAST  0.9% sodium chloride infusion  75 mL/hr IntraVENous CONTINUOUS  
 sodium chloride (NS) flush 5-10 mL  5-10 mL IntraVENous Q8H  
 sodium chloride (NS) flush 5-10 mL  5-10 mL IntraVENous PRN  
 sodium chloride (NS) flush 10-30 mL  10-30 mL InterCATHeter PRN  
  sodium chloride (NS) flush 10 mL  10 mL InterCATHeter Q24H  
 sodium chloride (NS) flush 10 mL  10 mL InterCATHeter PRN  
 sodium chloride (NS) flush 10-40 mL  10-40 mL InterCATHeter Q8H  
 bacitracin 500 unit/gram packet 1 Packet  1 Packet Topical PRN  
 atorvastatin (LIPITOR) tablet 40 mg  40 mg Oral QHS  aspirin tablet 325 mg  325 mg Oral DAILY ASSESSMENT: 
1. GI bleed Secondary to ulcerative esophagitis. Hb stable 2. Ulcerative esophagitis On Protonix RECOMMENDATIONS:  
1. 1. Cont Protonix 40 mg po bid . Repeat EGD in 6-8 weeks to r/o Fan's 2. Iron supplements bid 3. Out pt colonoscopy.   
 
Rosalva Golden MD

## 2019-01-05 NOTE — PROGRESS NOTES
Progress Note 
  
  
Patient: Tonie Talbert               Sex: male          DOA: 1/2/2019 YOB: 1960      Age:  63 y.o.        LOS:  LOS: 3 days CHIEF COMPLAINT: Rapid Heart Rate and Lethargy 
  
  
Subjective:  
  
States pain is controlled. Very easily frustrated with inability to get pain medicine outpatient.  
  
Objective:  
  
Visit Vitals /82 (BP 1 Location: Left arm, BP Patient Position: Supine) Pulse 73 Temp 98.2 °F (36.8 °C) Resp 18 Ht 6' 2\" (1.88 m) Wt 59 kg (130 lb) SpO2 96% BMI 16.69 kg/m²  
  
  
Physical Exam: 
Gen:  No distress, alert HEENT:  Normal cephalic atraumatic, extra-occular movements are intact. Neck:  Supple, No JVD Lungs:  Clear bilaterally, no wheeze, no rales, normal effort Heart:  Irregularly irregular rhythm, normal S1 and S2, no edema Abdomen:  Soft, non tender, normal bowel sounds, no guarding. Extremities:  Well perfused, no cyanosis or edema. Left foot wrapped in ACE wrap Neurological:  Awake and alert, CN's are intact, normal strength throughout extremities Skin:  No rashes or moles Mental Status:  Normal thought process, does not appear anxious   
  
  
  
Lab/Data Reviewed: All lab results for the last 24 hours reviewed. 
  
XR FOOT LT MIN 3 V Final Result IMPRESSION:  
   
   
1. Transmetatarsal indication of the left forefoot with subtle osseous erosions  
involving the distal margin of the first metatarsal head compatible with  
osteomyelitis.   
   
2. No evidence of fracture or retained radiopaque foreign object.  
   
XR CHEST PORT Final Result IMPRESSION:  
PICC line in reasonable position. No active disease.  
   
XR CHEST PORT Final Result IMPRESSION:  
   
No active cardiopulmonary disease.  
   
  
  
  
  
Assessment/Plan  
  
Principal Problem: 
  GI bleed (1/2/2019) 
  Active Problems: 
  A-fib ([address] (1/2/2019) 
  
  Hypocalcemia (1/2/2019) 
  
  Hypokalemia (1/2/2019) 
  
 PAD (peripheral artery disease) ([facility] (1/2/2019) 
  Acute blood loss anemia (1/2/2019) 
  
  Osteomyelitis of left foot ([facility] (1/4/2019) Overview: 1st metatarsal head  
  
  
  
Plan: 
EGD shows severe ulcerative esophagitis, biopsies to evaluate for viral process IV protonix changed to PO BID Cardizem drip discontinued by Cardiology, started on toprol XL Echo shows normal LVEF 
D/w Cardiology, OK to hold plavix for EGD Adriana Romina is 2, per cardiology would need to consider [facility] [place] eventually if OK with GI team, but can decide on that later as outpatient given severe esophagitis and recent low H/H required multiple transfusions Consult Podiatry for left 1st MTP osteomyelitis, planned for OR early next week, cannot do on weekend per podiatry Chronic pain - pt wants referral to pain medicine, discussed with CM, said PCP has to make referral 
Anemia - Hgb 9 from 7 after 2 units. Cont to monitor. 
  
  
DVT prophylaxis - hold chemoprophylaxis given UGIB. SCDs ordered.  If HGB is stable will restart sq heparin.

## 2019-01-05 NOTE — PROGRESS NOTES
[facility]  -  Progress Note Patient: Bhumi Carreon MRN: [id number]  SSN: [id number] YOB: 1960  Age: 63 y.o. Sex: male Admit Date: 1/2/2019 Assessment:  
 
- GI bleed, s/p EGD 1/3/18 that revealed severe ulcerative esophagitis from mid esophagus to distal esophagus s/p biopsy to evaluate for viral process and normal-appearing stomach and duodenum - Paroxysmal atrial fibrillation, CHADS2-Vasc score 1 (Vascular disease) - Echo 1/3/2019: EF 56-60%, no RWMA, age-appropriate diastolic function, mild mitral regurgitation - PAD with history of transmetatarsal amputation of the left foot - Osteomyelitis of L 1st metatarsal, podiatry assistance appreciated - Hypokalemia - Tobacco use Plan:  
 
Rhythm this morning is sinus with PAC's on tele monitor. S/P EGD showing severe ulcerative esophagitis Currently on  mg daily. CHADS2 VASC score is 2 ( HTN and PAD) Would need to consider anticoagualtion eventually if ok with GI team however would wait and decide on that at later date as outpatient as has severe ulcerative esophagitis with recent low H/H and transfusion and GIB. Changing amlodipine to Toprol XL 12.5 mg daily. Mariola Albright MD 
 
 
Subjective: No CP or sob No palpitations Awaiting foot surgery Objective:  
  
Patient Vitals for the past 8 hrs: 
 Temp Pulse Resp BP SpO2  
01/05/19 0727 97.4 °F (36.3 °C) 68 18 167/86 99 % 01/05/19 0324 97.7 °F (36.5 °C) 66 18 137/69 99 % Patient Vitals for the past 96 hrs: 
 Weight 01/03/19 0949 130 lb (59 kg) 01/03/19 0948 130 lb (59 kg) 01/02/19 0930 130 lb (59 kg) Intake/Output Summary (Last 24 hours) at 1/5/2019 1034 Last data filed at 1/5/2019 1003 Gross per 24 hour Intake 3786.93 ml Output 4650 ml Net -863.07 ml Physical Exam: 
General:  alert, cooperative, no distress, appears stated age Neck:  No JVD Lungs:  clear to auscultation bilaterally Heart:  Regular rhythm with premature beats Abdomen:  abdomen is soft without significant tenderness, masses, organomegaly or guarding Extremities:  Prior left transmetatarsal amputation, no appreciable edema Data Review:  
 
Labs: Results:  
   
Chemistry Recent Labs 01/05/19 
0330 01/03/19 
0415 01/02/19 
2145 * 117* 182*  143 140  
K 3.7 3.9 3.5  113* 110* CO2 27 24 22 BUN 10 38* 48* CREA 0.91 0.79 0.95  
CA 7.8* 8.0* 7.9*  
MG  --  2.3  --   
AGAP 8 6 8 BUCR 11* 48* 51* AP  --  85  --   
TP  --  5.3*  --   
ALB  --  2.8*  --   
GLOB  --  2.5  --   
AGRAT  --  1.1  --   
  
CBC w/Diff Recent Labs 01/05/19 
0330 01/04/19 
0519 01/03/19 
8356 WBC 7.5 8.7 15.1*  
RBC 3.02* 2.32* 2.68* HGB 9.0* 7.0* 8.3* HCT 26.7* 21.1* 24.5*  
 238 265 GRANS 50 59 76* LYMPH 27 21 11* EOS 13* 11* 6* Coagulation No results for input(s): PTP, INR, APTT in the last 72 hours. No lab exists for component: INREXT, INREXT Liver Enzymes Recent Labs 01/03/19 
8979 TP 5.3* ALB 2.8* AP 85 SGOT 17

## 2019-01-05 NOTE — ROUTINE PROCESS
Pt noted at nurses' station, requests meal for c/o hunger. Chart reviewed and turkey sandwich providedas per regular diet orders.

## 2019-01-06 LAB
BASOPHILS # BLD: 0.2 K/UL (ref 0–0.1)
BASOPHILS NFR BLD: 2 % (ref 0–2)
DIFFERENTIAL METHOD BLD: ABNORMAL
EOSINOPHIL # BLD: 1.2 K/UL (ref 0–0.4)
EOSINOPHIL NFR BLD: 13 % (ref 0–5)
ERYTHROCYTE [DISTWIDTH] IN BLOOD BY AUTOMATED COUNT: 15 % (ref 11.6–14.5)
ERYTHROCYTE [SEDIMENTATION RATE] IN BLOOD: 29 MM/HR (ref 0–20)
HCT VFR BLD AUTO: 27.2 % (ref 36–48)
HGB BLD-MCNC: 9.2 G/DL (ref 13–16)
LYMPHOCYTES # BLD: 2.2 K/UL (ref 0.9–3.6)
LYMPHOCYTES NFR BLD: 24 % (ref 21–52)
MCH RBC QN AUTO: 30.5 PG (ref 24–34)
MCHC RBC AUTO-ENTMCNC: 33.8 G/DL (ref 31–37)
MCV RBC AUTO: 90.1 FL (ref 74–97)
MONOCYTES # BLD: 0.7 K/UL (ref 0.05–1.2)
MONOCYTES NFR BLD: 8 % (ref 3–10)
NEUTS SEG # BLD: 4.8 K/UL (ref 1.8–8)
NEUTS SEG NFR BLD: 53 % (ref 40–73)
PLATELET # BLD AUTO: 259 K/UL (ref 135–420)
PMV BLD AUTO: 11.6 FL (ref 9.2–11.8)
RBC # BLD AUTO: 3.02 M/UL (ref 4.7–5.5)
WBC # BLD AUTO: 9 K/UL (ref 4.6–13.2)

## 2019-01-06 PROCEDURE — 36592 COLLECT BLOOD FROM PICC: CPT

## 2019-01-06 PROCEDURE — 85025 COMPLETE CBC W/AUTO DIFF WBC: CPT

## 2019-01-06 PROCEDURE — 74011250637 HC RX REV CODE- 250/637: Performed by: NURSE PRACTITIONER

## 2019-01-06 PROCEDURE — 74011250637 HC RX REV CODE- 250/637: Performed by: INTERNAL MEDICINE

## 2019-01-06 PROCEDURE — 85652 RBC SED RATE AUTOMATED: CPT

## 2019-01-06 PROCEDURE — 74011250637 HC RX REV CODE- 250/637: Performed by: HOSPITALIST

## 2019-01-06 PROCEDURE — 65660000000 HC RM CCU STEPDOWN

## 2019-01-06 PROCEDURE — 74011250636 HC RX REV CODE- 250/636: Performed by: INTERNAL MEDICINE

## 2019-01-06 PROCEDURE — 74011250636 HC RX REV CODE- 250/636: Performed by: HOSPITALIST

## 2019-01-06 PROCEDURE — 86141 C-REACTIVE PROTEIN HS: CPT

## 2019-01-06 RX ADMIN — OXYCODONE HYDROCHLORIDE AND ACETAMINOPHEN 1 TABLET: 7.5; 325 TABLET ORAL at 06:33

## 2019-01-06 RX ADMIN — VENLAFAXINE HYDROCHLORIDE 150 MG: 150 CAPSULE, EXTENDED RELEASE ORAL at 09:30

## 2019-01-06 RX ADMIN — GABAPENTIN 1200 MG: 400 CAPSULE ORAL at 22:40

## 2019-01-06 RX ADMIN — GABAPENTIN 1200 MG: 400 CAPSULE ORAL at 17:21

## 2019-01-06 RX ADMIN — Medication 10 ML: at 14:12

## 2019-01-06 RX ADMIN — ASPIRIN 325 MG ORAL TABLET 325 MG: 325 PILL ORAL at 09:30

## 2019-01-06 RX ADMIN — Medication 10 ML: at 22:45

## 2019-01-06 RX ADMIN — SODIUM CHLORIDE 75 ML/HR: 900 INJECTION, SOLUTION INTRAVENOUS at 00:30

## 2019-01-06 RX ADMIN — HYDROCODONE BITARTRATE AND ACETAMINOPHEN 1 TABLET: 5; 325 TABLET ORAL at 05:07

## 2019-01-06 RX ADMIN — HYDROCODONE BITARTRATE AND ACETAMINOPHEN 1 TABLET: 5; 325 TABLET ORAL at 11:45

## 2019-01-06 RX ADMIN — PANTOPRAZOLE SODIUM 40 MG: 40 TABLET, DELAYED RELEASE ORAL at 09:29

## 2019-01-06 RX ADMIN — OXYCODONE HYDROCHLORIDE AND ACETAMINOPHEN 1 TABLET: 7.5; 325 TABLET ORAL at 14:05

## 2019-01-06 RX ADMIN — SODIUM CHLORIDE 50 ML/HR: 900 INJECTION, SOLUTION INTRAVENOUS at 22:47

## 2019-01-06 RX ADMIN — Medication 10 ML: at 04:15

## 2019-01-06 RX ADMIN — OXYCODONE HYDROCHLORIDE AND ACETAMINOPHEN 1 TABLET: 7.5; 325 TABLET ORAL at 22:40

## 2019-01-06 RX ADMIN — METOPROLOL SUCCINATE 12.5 MG: 25 TABLET, EXTENDED RELEASE ORAL at 09:30

## 2019-01-06 RX ADMIN — ACETAMINOPHEN 650 MG: 650 TABLET, FILM COATED, EXTENDED RELEASE ORAL at 22:40

## 2019-01-06 RX ADMIN — Medication 20 ML: at 04:14

## 2019-01-06 RX ADMIN — GABAPENTIN 1200 MG: 400 CAPSULE ORAL at 09:30

## 2019-01-06 RX ADMIN — ATORVASTATIN CALCIUM 40 MG: 40 TABLET, FILM COATED ORAL at 22:40

## 2019-01-06 RX ADMIN — PANTOPRAZOLE SODIUM 40 MG: 40 TABLET, DELAYED RELEASE ORAL at 17:21

## 2019-01-06 NOTE — ROUTINE PROCESS
1915: Assumed care. Ambulating in the hallway. 2040: Patient back in bed. 
 
2121: Due meds given. 2243: Medicated for pain per patient request. 
 
1064: No change from previous assessment. 0230: Sleeping. 
 
0342: Patient wanting to go out to smoke. Patient been told/advised that he is not allowed to get off the floor & wont be centrally monitored. Offered nicotine patch. Refused. Still, went off the floor to smoke. 0400: Patient back on floor. 0295: Blood drawn from the PICC line. 1452: Slept on & off thru night. Needs attended. Patient refused due Tylenol. Would like Percocet instead. Given. Gave 2 cups of coffee this morning & still asking fo another cup of coffee. Advised patient not at this moment because I'm passing meds. And if he can wait till breakfast. Patient became verbally abusive & tossed the call light up.  
 
7894: Bedside and Verbal shift change report given to [address] [place] [address] (oncoming nurse) by me (offgoing nurse). Report included the following information SBAR, Kardex, Intake/Output, MAR and Recent Results.

## 2019-01-06 NOTE — PROGRESS NOTES
[facility]  -  Progress Note Patient: Freddie Marcum MRN: [id number]  SSN: [id number] YOB: 1960  Age: 63 y.o. Sex: male Admit Date: 1/2/2019 Assessment:  
 
- GI bleed, s/p EGD 1/3/18 that revealed severe ulcerative esophagitis from mid esophagus to distal esophagus s/p biopsy to evaluate for viral process and normal-appearing stomach and duodenum - Paroxysmal atrial fibrillation, CHADS2-Vasc score 1 (Vascular disease) - Echo 1/3/2019: EF 56-60%, no RWMA, age-appropriate diastolic function, mild mitral regurgitation - PAD with history of transmetatarsal amputation of the left foot - Osteomyelitis of L 1st metatarsal, podiatry assistance appreciated - Hypokalemia - Tobacco use Plan:  
 
Rhythm this morning is sinus with PAC's on tele monitor. S/P EGD showing severe ulcerative esophagitis Currently on  mg daily. CHADS2 VASC score is 2 ( HTN and PAD) Would need to consider anticoagualtion eventually if ok with GI team however would wait and decide on that at later date as outpatient as has severe ulcerative esophagitis with recent low H/H and transfusion and GIB. Changing Toprol XL 12.5 mg daily to 25 mg daily for better BP control and PAF Judie Gray MD 
 
 
Subjective: No CP or sob No palpitations Awaiting foot surgery Objective:  
  
Patient Vitals for the past 8 hrs: 
 Temp Pulse Resp BP SpO2  
01/06/19 0928 97.7 °F (36.5 °C) 90 18 (!) 150/98 97 % 01/06/19 0340 97.4 °F (36.3 °C) 65 18 (!) 152/98 98 % Patient Vitals for the past 96 hrs: 
 Weight 01/03/19 0949 130 lb (59 kg) 01/03/19 0948 130 lb (59 kg) Intake/Output Summary (Last 24 hours) at 1/6/2019 0235 Last data filed at 1/6/2019 283 Gross per 24 hour Intake 3500 ml Output 21637 ml Net -8550 ml Physical Exam: 
General:  alert, cooperative, no distress, appears stated age Neck:  No JVD Lungs:  clear to auscultation bilaterally Heart:  Regular rhythm with premature beats Abdomen:  abdomen is soft without significant tenderness, masses, organomegaly or guarding Extremities:  Prior left transmetatarsal amputation, no appreciable edema Data Review:  
 
Labs: Results:  
   
Chemistry Recent Labs 01/05/19 
0330 *   
K 3.7  CO2 27 BUN 10  
CREA 0.91  
CA 7.8* AGAP 8  
BUCR 11* CBC w/Diff Recent Labs 01/06/19 
0331 01/05/19 
0330 01/04/19 
2310 WBC 9.0 7.5 8.7  
RBC 3.02* 3.02* 2.32* HGB 9.2* 9.0* 7.0*  
HCT 27.2* 26.7* 21.1*  
 198 238 GRANS 53 50 59 LYMPH 24 27 21 EOS 13* 13* 11* Coagulation No results for input(s): PTP, INR, APTT in the last 72 hours. No lab exists for component: INREXT, INREXT Liver Enzymes No results for input(s): TP, ALB, TBIL, AP, SGOT, GPT in the last 72 hours.  
 
No lab exists for component: DBIL

## 2019-01-06 NOTE — PROGRESS NOTES
Problem: Falls - Risk of 
Goal: *Absence of Falls Document Alysa Alfonso Fall Risk and appropriate interventions in the flowsheet. Outcome: Progressing Towards Goal 
Fall Risk Interventions: 
Mobility Interventions: Patient to call before getting OOB Medication Interventions: Patient to call before getting OOB, Teach patient to arise slowly Elimination Interventions: Call light in reach, Patient to call for help with toileting needs, Toilet paper/wipes in reach, Toileting schedule/hourly rounds, Urinal in reach History of Falls Interventions: Consult care management for discharge planning Problem: Pressure Injury - Risk of 
Goal: *Prevention of pressure injury Document Hal Scale and appropriate interventions in the flowsheet. Outcome: Progressing Towards Goal 
Pressure Injury Interventions: 
Sensory Interventions: Assess changes in LOC, Avoid rigorous massage over bony prominences, Check visual cues for pain, Discuss PT/OT consult with provider, Keep linens dry and wrinkle-free, Maintain/enhance activity level, Minimize linen layers, Monitor skin under medical devices, Pad between skin to skin, Pressure redistribution bed/mattress (bed type) Activity Interventions: Increase time out of bed, Pressure redistribution bed/mattress(bed type) Mobility Interventions: HOB 30 degrees or less, Pressure redistribution bed/mattress (bed type) Nutrition Interventions: Document food/fluid/supplement intake

## 2019-01-06 NOTE — PROGRESS NOTES
Progress Note Patient: Santino Ruvalcaba               Sex: male          DOA: 1/2/2019 YOB: 1960      Age:  63 y.o.        LOS:  LOS: 4 days CHIEF COMPLAINT: Rapid Heart Rate and Lethargy Subjective:  
 
States pain is controlled. No complaints. States he feels much better Objective:  
  
Visit Vitals BP (!) 137/96 (BP 1 Location: Left arm, BP Patient Position: At rest) Pulse 70 Temp 98.1 °F (36.7 °C) Resp 18 Ht 6' 2\" (1.88 m) Wt 59 kg (130 lb) SpO2 96% BMI 16.69 kg/m² Physical Exam: 
Gen:  No distress, alert HEENT:  Normal cephalic atraumatic, extra-occular movements are intact. Neck:  Supple, No JVD Lungs:  Clear bilaterally, no wheeze, no rales, normal effort Heart:  Irregularly irregular rhythm, normal S1 and S2, no edema Abdomen:  Soft, non tender, normal bowel sounds, no guarding. Extremities:  Well perfused, no cyanosis or edema. Left foot wrapped in ACE wrap Neurological:  Awake and alert, CN's are intact, normal strength throughout extremities Skin:  No rashes or moles Mental Status:  Normal thought process, does not appear anxious Lab/Data Reviewed: All lab results for the last 24 hours reviewed. XR FOOT LT MIN 3 V Final Result IMPRESSION:  
  
  
1. Transmetatarsal indication of the left forefoot with subtle osseous erosions  
involving the distal margin of the first metatarsal head compatible with  
osteomyelitis. 2. No evidence of fracture or retained radiopaque foreign object. XR CHEST PORT Final Result IMPRESSION:  
PICC line in reasonable position. No active disease. XR CHEST PORT Final Result IMPRESSION:  
  
No active cardiopulmonary disease. Assessment/Plan Principal Problem: 
  GI bleed (1/2/2019) Active Problems: 
  A-fib ([address] (1/2/2019) Hypocalcemia (1/2/2019) Hypokalemia (1/2/2019) PAD (peripheral artery disease) ([address] (1/2/2019) Acute blood loss anemia (1/2/2019) Osteomyelitis of left foot ([address] (1/4/2019) Overview: 1st metatarsal head Plan: 
EGD shows severe ulcerative esophagitis, biopsies to evaluate for viral process pending IV protonix changed to PO BID Cardizem drip discontinued by Cardiology, started on toprol XL Echo shows normal LVEF 
D/w Cardiology, OK to hold plavix for EGD, can resume on discharge Hieu Oliveira is 2, per cardiology would need to consider [facility] eventually if OK with GI team, but can decide on that later as outpatient given severe esophagitis and recent low H/H required multiple transfusions Consult Podiatry for left 1st MTP osteomyelitis, planned for OR early next week, cannot do on weekend per podiatry Chronic pain - pt wants referral to pain medicine, discussed with CM, said PCP has to make referral. Pt wants new PCP, can discuss with CM on Monday. Anemia - Hgb 9 from 7 after 2 units. Cont to monitor. DVT prophylaxis - hold chemoprophylaxis given UGIB. SCDs ordered. Pt is ambulatory.

## 2019-01-06 NOTE — ROUTINE PROCESS
Bedside, Verbal and Written shift change report given to Erika Fairbanks RN (oncoming nurse) by Arelis Schmidt RN (offgoing nurse). Report included the following information SBAR, Kardex, Intake/Output, MAR, Recent Results, Med Rec Status, Procedure Summary and Cardiac Rhythm NSR.    
0700 - Shift assessment completed. Pt alert and oriented x4. No respiratory distress noted. Pt c/o pain to left foot, unable to administer pain medication at this time - last received PRN Percocet at 8434. Call bell within reach, bed in low position. Will continue to monitor. 
   
0930 - Dr Linda Cedeno at bedside speaking with pt. Stated that it is okay for pt to be off telemetry for short periods of time to take a shower and go outside for fresh air. 1145 - Pt c/o pain to left foot, PRN Norco administered. 
  
1200 - Shift re-assessment completed, no change in pt condition.   
1405 - Pt c/o pain to left foot, PRN Percocet administered. 
  
1600 - Shift re-assessment completed, no change in pt condition. 
    
Bedside, Verbal and Written shift change report given to Jimmy Hussein RN (oncoming nurse) by Erika Fairbanks RN (offgoing nurse). Report included the following information SBAR, Kardex, Intake/Output, MAR, Recent Results, Med Rec Status, Procedure Summary and Cardiac Rhythm NSR w/PAC's.

## 2019-01-06 NOTE — PROGRESS NOTES
Problem: Falls - Risk of 
Goal: *Absence of Falls Document John Wan Fall Risk and appropriate interventions in the flowsheet. Outcome: Progressing Towards Goal 
Fall Risk Interventions: 
Mobility Interventions: Patient to call before getting OOB, Strengthening exercises (ROM-active/passive), Utilize walker, cane, or other assistive device Medication Interventions: Patient to call before getting OOB, Teach patient to arise slowly Elimination Interventions: Call light in reach, Patient to call for help with toileting needs, Toilet paper/wipes in reach, Toileting schedule/hourly rounds, Urinal in reach History of Falls Interventions: Consult care management for discharge planning Problem: Pressure Injury - Risk of 
Goal: *Prevention of pressure injury Document Hal Scale and appropriate interventions in the flowsheet. Outcome: Progressing Towards Goal 
Pressure Injury Interventions: 
Sensory Interventions: Assess changes in LOC, Avoid rigorous massage over bony prominences, Check visual cues for pain, Keep linens dry and wrinkle-free, Maintain/enhance activity level, Minimize linen layers, Monitor skin under medical devices, Pad between skin to skin, Pressure redistribution bed/mattress (bed type) Activity Interventions: Increase time out of bed, Pressure redistribution bed/mattress(bed type) Mobility Interventions: HOB 30 degrees or less, Pressure redistribution bed/mattress (bed type) Nutrition Interventions: Document food/fluid/supplement intake, Discuss nutritional consult with provider

## 2019-01-06 NOTE — PROGRESS NOTES
PROGRESS NOTE PATIENT:  Rafy Washington MRN: [id number] [facility]/[address], [address] 
         1/6/2019, 12:27 PM 
   
I 
Mr. Michael Cummings is a 63 y.o. male who is being seen for Anemia, GI bleed SUBJECTIVE: 
Pt has no complaints. Tolerating diet. OBJECTIVE: 
Patient Vitals for the past 24 hrs: 
 Temp Pulse Resp BP SpO2  
01/06/19 1148    (!) 137/96   
01/06/19 1127 98.1 °F (36.7 °C) 70 18 181/83 96 % 01/06/19 0928 97.7 °F (36.5 °C) 90 18 (!) 150/98 97 % 01/06/19 0340 97.4 °F (36.3 °C) 65 18 (!) 152/98 98 % 01/06/19 0027 97.8 °F (36.6 °C) 68 18 147/74 99 % 01/05/19 2040 97.9 °F (36.6 °C) 73 18 144/59 99 % 01/05/19 1545 97.9 °F (36.6 °C) 70 18 149/77 97 % 01/05/19 1246 98.2 °F (36.8 °C) 73 18 167/82 96 % Intake/Output Summary (Last 24 hours) at 1/6/2019 1204 Last data filed at 1/6/2019 1023 Gross per 24 hour Intake 3120 ml Output 65469 ml Net -8080 ml Gen: NAD Heent: No pallor, icterus Abd  : Soft, non tender, BS +, No masses felt. Labs: Results:  
Chemistry Recent Labs 01/05/19 
0330 *   
K 3.7  CO2 27 BUN 10  
CREA 0.91  
CA 7.8* AGAP 8  
BUCR 11* Estimated Creatinine Clearance: 74.7 mL/min (based on SCr of 0.91 mg/dL). CBC w/Diff Recent Labs 01/06/19 
8079 01/05/19 
0330 01/04/19 
0839 WBC 9.0 7.5 8.7  
RBC 3.02* 3.02* 2.32* HGB 9.2* 9.0* 7.0*  
HCT 27.2* 26.7* 21.1*  
 198 238 GRANS 53 50 59 LYMPH 24 27 21 EOS 13* 13* 11* Cardiac Enzymes No results for input(s): CPK, CKND1, ULI in the last 72 hours. No lab exists for component: Bulah Ratel Coagulation No results for input(s): PTP, INR, APTT in the last 72 hours. No lab exists for component: INREXT, INREXT Hepatitis Panel No results found for: HAMAT, HAAB, HABT, HAAT, HBSAG, HBSB, HBSAT, HBABN, HBCM, HBCAB, HBCAT, XBCABS, HBEAB, HBEAG, XHEPCS, [id number], HBEGLT, [place], HBCLT, [facility], [id number], [id number], HAVT, [id number], Marlo, [id number], HCGAT Amylase Lipase Liver Enzymes No results for input(s): TP, ALB, TBILI, AP, SGOT, ALT in the last 72 hours. No lab exists for component: DBIL Thyroid Studies No results for input(s): T4, T3U, TSH, TSHEXT, TSHEXT in the last 72 hours. No lab exists for component: T3RU Pathology pathology No Known Allergies Current Facility-Administered Medications Medication Dose Route Frequency  pantoprazole (PROTONIX) tablet 40 mg  40 mg Oral ACB&D  
 HYDROcodone-acetaminophen (NORCO) 5-325 mg per tablet 1 Tab  1 Tab Oral Q6H PRN  
 acetaminophen (TYLENOL) SR tablet 650 mg  650 mg Oral Q8H  
 0.9% sodium chloride infusion 250 mL  250 mL IntraVENous PRN  
 oxyCODONE-acetaminophen (PERCOCET 7.5) 7.5-325 mg per tablet 1 Tab  1 Tab Oral Q6H PRN  
 metoprolol succinate (TOPROL-XL) XL tablet 12.5 mg  12.5 mg Oral DAILY  gabapentin (NEURONTIN) capsule 1,200 mg  1,200 mg Oral TID  venlafaxine-SR (EFFEXOR-XR) capsule 150 mg  150 mg Oral DAILY WITH BREAKFAST  0.9% sodium chloride infusion  50 mL/hr IntraVENous CONTINUOUS  
 sodium chloride (NS) flush 5-10 mL  5-10 mL IntraVENous Q8H  
 sodium chloride (NS) flush 5-10 mL  5-10 mL IntraVENous PRN  
 sodium chloride (NS) flush 10-30 mL  10-30 mL InterCATHeter PRN  
 sodium chloride (NS) flush 10 mL  10 mL InterCATHeter Q24H  
 sodium chloride (NS) flush 10 mL  10 mL InterCATHeter PRN  
 sodium chloride (NS) flush 10-40 mL  10-40 mL InterCATHeter Q8H  
 bacitracin 500 unit/gram packet 1 Packet  1 Packet Topical PRN  
 atorvastatin (LIPITOR) tablet 40 mg  40 mg Oral QHS  aspirin tablet 325 mg  325 mg Oral DAILY ASSESSMENT: 
1. GI bleed Secondary to ulcerative esophagitis. Hb stable, 9.2 gm/dl 2. Ulcerative esophagitis On Protonix RECOMMENDATIONS:  
1. Cont Protonix 40 mg po bid . Repeat EGD in 6-8 weeks to r/o Fan's 2. Iron supplements bid 3. Out pt colonoscopy. Will sign off . Please call us back as needed. Follow up with Dr. Oneal Davidson in 6 weeks Jaime Snider MD

## 2019-01-06 NOTE — ROUTINE PROCESS
Bedside, Verbal and Written shift change report given to Sánchez Camejo RN (oncoming nurse) by Lucie Isidro RN (offgoing nurse). Report included the following information SBAR, Kardex, Intake/Output, MAR, Recent Results, Med Rec Status, Procedure Summary and Cardiac Rhythm NSR. Protonix drip rate verified, 8 mg/hr. 
   
0740 - Shift assessment completed. Pt alert and oriented x4. No respiratory distress noted. Pt c/o pain to left foot, unable to administer pain medication at this time. Call bell within reach, bed in low position. Will continue to monitor. 
   
1005 - Pt c/o pain to left foot, PRN Percocet administered. Pt's Protonix drip stopped per orders and switched to IV push Protonix. 1240 - Shift re-assessment completed, no change in pt condition. 
   
1304 - Pt c/o pain to left foot, PRN Norco administered. 5 - Pt c/o pain to left foot, PRN Percocet administered. 1640 - Shift re-assessment completed, no change in pt condition. 
   
1907 - Pt c/o pain to left foot, PRN [place] administered. Bedside, Verbal and Written shift change report given to Lucie Isidro RN (oncoming nurse) by Sánchez Camejo RN (offgoing nurse). Report included the following information SBAR, Kardex, Intake/Output, MAR, Recent Results, Med Rec Status, Procedure Summary and Cardiac Rhythm NSR.

## 2019-01-07 ENCOUNTER — ANESTHESIA (OUTPATIENT)
Dept: SURGERY | Age: 59
DRG: 229 | End: 2019-01-07
Payer: MEDICAID

## 2019-01-07 ENCOUNTER — ANESTHESIA EVENT (OUTPATIENT)
Dept: SURGERY | Age: 59
DRG: 229 | End: 2019-01-07
Payer: MEDICAID

## 2019-01-07 PROCEDURE — 74011250636 HC RX REV CODE- 250/636

## 2019-01-07 PROCEDURE — 76060000033 HC ANESTHESIA 1 TO 1.5 HR: Performed by: PODIATRIST

## 2019-01-07 PROCEDURE — 87070 CULTURE OTHR SPECIMN AEROBIC: CPT

## 2019-01-07 PROCEDURE — 77030029099 HC BN WAX SSPC -A: Performed by: PODIATRIST

## 2019-01-07 PROCEDURE — 87116 MYCOBACTERIA CULTURE: CPT

## 2019-01-07 PROCEDURE — 88304 TISSUE EXAM BY PATHOLOGIST: CPT

## 2019-01-07 PROCEDURE — 0QBP0ZX EXCISION OF LEFT METATARSAL, OPEN APPROACH, DIAGNOSTIC: ICD-10-PCS | Performed by: PODIATRIST

## 2019-01-07 PROCEDURE — 74011250636 HC RX REV CODE- 250/636: Performed by: ANESTHESIOLOGY

## 2019-01-07 PROCEDURE — 87075 CULTR BACTERIA EXCEPT BLOOD: CPT

## 2019-01-07 PROCEDURE — 74011250637 HC RX REV CODE- 250/637: Performed by: HOSPITALIST

## 2019-01-07 PROCEDURE — 0QBP0ZZ EXCISION OF LEFT METATARSAL, OPEN APPROACH: ICD-10-PCS | Performed by: PODIATRIST

## 2019-01-07 PROCEDURE — 77030006773 HC BLD SAW OSC BRSM -A: Performed by: PODIATRIST

## 2019-01-07 PROCEDURE — 76010000149 HC OR TIME 1 TO 1.5 HR: Performed by: PODIATRIST

## 2019-01-07 PROCEDURE — 87077 CULTURE AEROBIC IDENTIFY: CPT

## 2019-01-07 PROCEDURE — 74011000272 HC RX REV CODE- 272: Performed by: PODIATRIST

## 2019-01-07 PROCEDURE — 74011250637 HC RX REV CODE- 250/637: Performed by: INTERNAL MEDICINE

## 2019-01-07 PROCEDURE — 74011250637 HC RX REV CODE- 250/637: Performed by: NURSE PRACTITIONER

## 2019-01-07 PROCEDURE — 88305 TISSUE EXAM BY PATHOLOGIST: CPT

## 2019-01-07 PROCEDURE — 77030013708 HC HNDPC SUC IRR PULS STRY –B: Performed by: PODIATRIST

## 2019-01-07 PROCEDURE — 77030003009 HC SUT SURG STL J&J -A: Performed by: PODIATRIST

## 2019-01-07 PROCEDURE — 77030027714 HC DRN WND KT TLS STRY -B: Performed by: PODIATRIST

## 2019-01-07 PROCEDURE — 77030020753 HC CUF TRNQT 1BLA STRY -B: Performed by: PODIATRIST

## 2019-01-07 PROCEDURE — 74011250636 HC RX REV CODE- 250/636: Performed by: PODIATRIST

## 2019-01-07 PROCEDURE — 0HXNXZZ TRANSFER LEFT FOOT SKIN, EXTERNAL APPROACH: ICD-10-PCS | Performed by: PODIATRIST

## 2019-01-07 PROCEDURE — 77030000032 HC CUF TRNQT ZIMM -B: Performed by: PODIATRIST

## 2019-01-07 PROCEDURE — 74011000250 HC RX REV CODE- 250: Performed by: PODIATRIST

## 2019-01-07 PROCEDURE — 87186 SC STD MICRODIL/AGAR DIL: CPT

## 2019-01-07 PROCEDURE — 77030018836 HC SOL IRR NACL ICUM -A: Performed by: PODIATRIST

## 2019-01-07 PROCEDURE — 88311 DECALCIFY TISSUE: CPT

## 2019-01-07 PROCEDURE — 65660000000 HC RM CCU STEPDOWN

## 2019-01-07 PROCEDURE — 76210000006 HC OR PH I REC 0.5 TO 1 HR: Performed by: PODIATRIST

## 2019-01-07 RX ORDER — PROPOFOL 10 MG/ML
INJECTION, EMULSION INTRAVENOUS AS NEEDED
Status: DISCONTINUED | OUTPATIENT
Start: 2019-01-07 | End: 2019-01-07 | Stop reason: HOSPADM

## 2019-01-07 RX ORDER — CLINDAMYCIN PHOSPHATE 900 MG/50ML
INJECTION INTRAVENOUS AS NEEDED
Status: DISCONTINUED | OUTPATIENT
Start: 2019-01-07 | End: 2019-01-07 | Stop reason: HOSPADM

## 2019-01-07 RX ORDER — PROPOFOL 10 MG/ML
INJECTION, EMULSION INTRAVENOUS
Status: DISCONTINUED | OUTPATIENT
Start: 2019-01-07 | End: 2019-01-07 | Stop reason: HOSPADM

## 2019-01-07 RX ORDER — FENTANYL CITRATE 50 UG/ML
INJECTION, SOLUTION INTRAMUSCULAR; INTRAVENOUS AS NEEDED
Status: DISCONTINUED | OUTPATIENT
Start: 2019-01-07 | End: 2019-01-07 | Stop reason: HOSPADM

## 2019-01-07 RX ORDER — LIDOCAINE HYDROCHLORIDE 20 MG/ML
INJECTION, SOLUTION EPIDURAL; INFILTRATION; INTRACAUDAL; PERINEURAL AS NEEDED
Status: DISCONTINUED | OUTPATIENT
Start: 2019-01-07 | End: 2019-01-07 | Stop reason: HOSPADM

## 2019-01-07 RX ORDER — METOPROLOL SUCCINATE 25 MG/1
25 TABLET, EXTENDED RELEASE ORAL DAILY
Status: DISCONTINUED | OUTPATIENT
Start: 2019-01-08 | End: 2019-01-11 | Stop reason: HOSPADM

## 2019-01-07 RX ORDER — OXYCODONE AND ACETAMINOPHEN 7.5; 325 MG/1; MG/1
1-2 TABLET ORAL
Status: DISCONTINUED | OUTPATIENT
Start: 2019-01-07 | End: 2019-01-08

## 2019-01-07 RX ORDER — SODIUM CHLORIDE, SODIUM LACTATE, POTASSIUM CHLORIDE, CALCIUM CHLORIDE 600; 310; 30; 20 MG/100ML; MG/100ML; MG/100ML; MG/100ML
50 INJECTION, SOLUTION INTRAVENOUS CONTINUOUS
Status: DISCONTINUED | OUTPATIENT
Start: 2019-01-07 | End: 2019-01-10

## 2019-01-07 RX ORDER — CLINDAMYCIN PHOSPHATE 900 MG/50ML
INJECTION INTRAVENOUS
Status: COMPLETED
Start: 2019-01-07 | End: 2019-01-07

## 2019-01-07 RX ORDER — MIDAZOLAM HYDROCHLORIDE 1 MG/ML
INJECTION, SOLUTION INTRAMUSCULAR; INTRAVENOUS AS NEEDED
Status: DISCONTINUED | OUTPATIENT
Start: 2019-01-07 | End: 2019-01-07 | Stop reason: HOSPADM

## 2019-01-07 RX ADMIN — OXYCODONE HYDROCHLORIDE AND ACETAMINOPHEN 2 TABLET: 7.5; 325 TABLET ORAL at 21:02

## 2019-01-07 RX ADMIN — PROPOFOL 50 MG: 10 INJECTION, EMULSION INTRAVENOUS at 14:20

## 2019-01-07 RX ADMIN — HYDROCODONE BITARTRATE AND ACETAMINOPHEN 1 TABLET: 5; 325 TABLET ORAL at 08:42

## 2019-01-07 RX ADMIN — VENLAFAXINE HYDROCHLORIDE 150 MG: 150 CAPSULE, EXTENDED RELEASE ORAL at 08:37

## 2019-01-07 RX ADMIN — METOPROLOL SUCCINATE 12.5 MG: 25 TABLET, EXTENDED RELEASE ORAL at 08:37

## 2019-01-07 RX ADMIN — PROPOFOL 75 MCG/KG/MIN: 10 INJECTION, EMULSION INTRAVENOUS at 14:24

## 2019-01-07 RX ADMIN — OXYCODONE HYDROCHLORIDE AND ACETAMINOPHEN 1 TABLET: 7.5; 325 TABLET ORAL at 16:49

## 2019-01-07 RX ADMIN — CLINDAMYCIN PHOSPHATE 900 MG: 900 INJECTION INTRAVENOUS at 14:27

## 2019-01-07 RX ADMIN — FENTANYL CITRATE 50 MCG: 50 INJECTION, SOLUTION INTRAMUSCULAR; INTRAVENOUS at 14:30

## 2019-01-07 RX ADMIN — HYDROCODONE BITARTRATE AND ACETAMINOPHEN 1 TABLET: 5; 325 TABLET ORAL at 18:05

## 2019-01-07 RX ADMIN — Medication 10 ML: at 21:04

## 2019-01-07 RX ADMIN — PANTOPRAZOLE SODIUM 40 MG: 40 TABLET, DELAYED RELEASE ORAL at 16:39

## 2019-01-07 RX ADMIN — OXYCODONE HYDROCHLORIDE AND ACETAMINOPHEN 1 TABLET: 7.5; 325 TABLET ORAL at 06:48

## 2019-01-07 RX ADMIN — ACETAMINOPHEN 650 MG: 650 TABLET, FILM COATED, EXTENDED RELEASE ORAL at 06:48

## 2019-01-07 RX ADMIN — ATORVASTATIN CALCIUM 40 MG: 40 TABLET, FILM COATED ORAL at 21:30

## 2019-01-07 RX ADMIN — Medication 40 ML: at 21:04

## 2019-01-07 RX ADMIN — FENTANYL CITRATE 50 MCG: 50 INJECTION, SOLUTION INTRAMUSCULAR; INTRAVENOUS at 14:20

## 2019-01-07 RX ADMIN — SODIUM CHLORIDE, SODIUM LACTATE, POTASSIUM CHLORIDE, AND CALCIUM CHLORIDE 50 ML/HR: 600; 310; 30; 20 INJECTION, SOLUTION INTRAVENOUS at 13:20

## 2019-01-07 RX ADMIN — GABAPENTIN 1200 MG: 400 CAPSULE ORAL at 16:40

## 2019-01-07 RX ADMIN — MIDAZOLAM HYDROCHLORIDE 2 MG: 1 INJECTION, SOLUTION INTRAMUSCULAR; INTRAVENOUS at 14:14

## 2019-01-07 RX ADMIN — LIDOCAINE HYDROCHLORIDE 40 MG: 20 INJECTION, SOLUTION EPIDURAL; INFILTRATION; INTRACAUDAL; PERINEURAL at 14:20

## 2019-01-07 RX ADMIN — GABAPENTIN 1200 MG: 400 CAPSULE ORAL at 21:30

## 2019-01-07 RX ADMIN — SODIUM CHLORIDE, SODIUM LACTATE, POTASSIUM CHLORIDE, AND CALCIUM CHLORIDE 50 ML/HR: 600; 310; 30; 20 INJECTION, SOLUTION INTRAVENOUS at 16:15

## 2019-01-07 RX ADMIN — ACETAMINOPHEN 650 MG: 650 TABLET, FILM COATED, EXTENDED RELEASE ORAL at 14:00

## 2019-01-07 NOTE — PROGRESS NOTES
Problem: Falls - Risk of 
Goal: *Absence of Falls Document [place]ston Silas Fall Risk and appropriate interventions in the flowsheet. Fall Risk Interventions: 
Mobility Interventions: Assess mobility with egress test, Patient to call before getting OOB Medication Interventions: Bed/chair exit alarm, Patient to call before getting OOB Elimination Interventions: Call light in reach History of Falls Interventions: Room close to nurse's station Problem: Pressure Injury - Risk of 
Goal: *Prevention of pressure injury Document Hal Scale and appropriate interventions in the flowsheet. Outcome: Progressing Towards Goal 
Pressure Injury Interventions: 
Sensory Interventions: Assess changes in LOC, Check visual cues for pain Activity Interventions: Pressure redistribution bed/mattress(bed type) Mobility Interventions: HOB 30 degrees or less Nutrition Interventions: Document food/fluid/supplement intake

## 2019-01-07 NOTE — PERIOP NOTES
Called to the floor, spoke to Zaira HARRISON to preop the patient and wiped the patient down with the chg wipes. Patient has been NPO since midnight.  Zaira HARRISON will complete the pre-proc checklist

## 2019-01-07 NOTE — PERIOP NOTES
Recd care of pt from OR via bed. Resp even and unlabored. Attached to monitor. VSS. OR, MAR and anesthesia report acknowledged. No c/o voiced. Will cont to monitor. 1600  TRANSFER - OUT REPORT: 
 
Verbal report given to HUNTER Meneses (name) on Freddie Marcum  being transferred to [facility] (unit) for routine post - op Report consisted of patients Situation, Background, Assessment and  
Recommendations(SBAR). Information from the following report(s) SBAR, OR Summary, MAR and Med Rec Status was reviewed with the receiving nurse. Lines: PICC Triple Lumen 01/02/19 Right;Brachial (Active) Central Line Being Utilized Yes 1/7/2019  8:39 AM  
Criteria for Appropriate Use Long term IV/antibiotic administration 1/7/2019  8:39 AM  
Site Assessment Clean, dry, & intact 1/7/2019  8:39 AM  
Phlebitis Assessment 0 1/7/2019  8:39 AM  
Infiltration Assessment 0 1/7/2019  8:39 AM  
Arm Circumference (cm) 31 cm 1/2/2019  3:02 PM  
Date of Last Dressing Change 01/02/19 1/7/2019  8:39 AM  
Dressing Status Clean, dry, & intact 1/7/2019  8:39 AM  
External Catheter Length (cm) 0 centimeters 1/5/2019  7:15 PM  
Dressing Type Transparent;Disk with Chlorhexadine gluconate (CHG) 1/7/2019  8:39 AM  
Action Taken Open ports on tubing capped 1/6/2019  7:00 AM  
Hub Color/Line Status Gray;Capped;Flushed 1/7/2019  8:39 AM  
Positive Blood Return (Site #1) Yes 1/7/2019  8:39 AM  
Hub Color/Line Status Red;Flushed;Capped 1/7/2019  8:39 AM  
Positive Blood Return (Site #2) Yes 1/7/2019  8:39 AM  
Hub Color/Line Status White; Infusing 1/7/2019  8:39 AM  
Positive Blood Return (Site #3) Yes 1/7/2019  8:39 AM  
Alcohol Cap Used Yes 1/7/2019  8:39 AM  
  
 
Opportunity for questions and clarification was provided. Patient transported with: 
 Monitor

## 2019-01-07 NOTE — PROGRESS NOTES
Problem: Falls - Risk of 
Goal: *Absence of Falls Document Fareed Mueller Fall Risk and appropriate interventions in the flowsheet. Outcome: Progressing Towards Goal 
Fall Risk Interventions: 
Mobility Interventions: Assess mobility with egress test, Patient to call before getting OOB Medication Interventions: Bed/chair exit alarm, Patient to call before getting OOB Elimination Interventions: Call light in reach History of Falls Interventions: Room close to nurse's station Problem: Pressure Injury - Risk of 
Goal: *Prevention of pressure injury Document Hal Scale and appropriate interventions in the flowsheet. Outcome: Progressing Towards Goal 
Pressure Injury Interventions: 
Sensory Interventions: Assess changes in LOC, Check visual cues for pain Activity Interventions: Pressure redistribution bed/mattress(bed type) Mobility Interventions: HOB 30 degrees or less Nutrition Interventions: Document food/fluid/supplement intake

## 2019-01-07 NOTE — PROGRESS NOTES
0800 dr bella in room to see pt, says to give toprol and effexor only this am, wait to see if dr will be operating today. 0930 call from [facility] to complete preproc checklist and use chg wipes on foot. 1233 consent completed. 1000 pt walked to [facility], will do chg on foot when he returns. 1250 pt off floor to surgery 1615 pt back on floor. 1810 dr Nina gutierrez, pt requesting increase in pain med. Order to increase percocet to 1-2 tabs q4, discontinue norco.  
 
1915 Bedside and Verbal shift change report given to daron rn (oncoming nurse) by Raad Butt RN 
 (offgoing nurse). Report included the following information Kardex, MAR and Recent Results.

## 2019-01-07 NOTE — ROUTINE PROCESS
This RN Supervisor responded to pt yelling outside front hospital entrance to [facility] me in\". Pt had exited locked front door and could not re-enter. Pt reports he had gone outside for some air. Pt dressed in gown with IV medications and pole in tow. Pt advised that he should not go outside this late at night unaccompanied with IV medications and that door automatically locks at night. Pt verbalized understanding. Pt's primary RN Dionicio Wrightist made aware that was pt was outside. Pt returned to room.

## 2019-01-07 NOTE — PROGRESS NOTES
Podiatry Surgery Progress Note Patient: Festus Acosta MRN: [id number] LOS: 5 YOB: 1960  Age: 63 y.o. Sex: male Admit Date: 1/2/2019 POD 4 Days Post-Op Procedure:   Procedure(s): ESOPHAGOGASTRODUODENOSCOPY (EGD) Subjective:  
Festus Acosta is a 64 y/o male seen at bedside this morning state over all feeling fine. Patient is  currently resting quiet and no apparent distress. Objective:  
 
Vitals Patient Vitals for the past 8 hrs: 
 BP Temp Pulse Resp SpO2  
01/07/19 0839 154/88 97.8 °F (36.6 °C) 72 20 97 % 01/07/19 0559 120/53 98.7 °F (37.1 °C) 86 18 97 % 01/07/19 0058 170/87 97.6 °F (36.4 °C) 84 19 100 % I/O Current Shift No intake/output data recorded. I/O Last Three Shifts 01/05 1901 - 01/07 0700 In: 2547.9 [P.O.:960; I.V.:1587.9] Out: 5925 [DINIL:3485] Data Review No results found for this or any previous visit (from the past 24 hour(s)). Physical Exam: 
 
Festus Acosta  is a 63 y.o. male who is pleasant, alert and oriented x3, in no apparent distress. Patient is well-developed and nourished, with good attention to hygiene and body habitus. Mood and affect normal, appropriate to situation. Left foot s/p TMA site present with grade 3 + ulcer present over the medial aspect of the site. There is Necrotic and non viable tissue present to the tonja wound. Still malodor present. Some diffuse erythema present localized to the tonja wound area. No ascending lymphangitis. No active drainage noted Vascular Exam:  
Dorsalis Pedis 1/4 and Posterior Tibial 2/4  Bilaterally. Skin temp warm to warm. Pedal hair is decreased. There are not varicosities present. PVL study: KETURAH - resting KETURAH is normal 
No evidence of significant arterial disease in the lower extremity bilaterally at rest, with the exception of isolated posterior tibial disease bilaterally. Neurological Exam: Light touch protective sensation is absent to both feet. There is noted Loss of protective sensation. Musculoskeletal Exam: Muscle tone is normal for age. The muscle strength is 5/5 for the flexors, extensors, inverters, and everter's. Dermatological Exam:  
Skin is of abnormal texture and turgor with some atrophic skin changes noting decreased hair growth, nail changes (thickening), pigmentary changes, skin texture (thin,shiney), skin color (rubor, red) . Radiographic: (see report for details) Left foot - left forefoot with subtle osseous erosions 
involving the distal margin of the first metatarsal head compatible with 
osteomyelitis. Wound Presentation: Wound Toe (specify in comments) Anterior; Left (Active) DRESSING STATUS Changed per order;Clean, dry, and intact 1/6/2019  7:00 AM  
DRESSING TYPE 4 x 4;Gauze wrap (ting); Elastic bandage 1/6/2019  7:00 AM  
Non-Pressure Injury Full thickness (subcut/muscle) 1/6/2019  7:00 AM  
Wound Length (cm) 6.9 cm 1/4/2019 10:15 AM  
Wound Width (cm) 5 cm 1/4/2019 10:15 AM  
Wound Depth (cm) 1 1/4/2019 10:15 AM  
Wound Surface area (cm^2) 34.5 cm^2 1/4/2019 10:15 AM  
Condition of Base Slough;Eschar 1/6/2019  7:00 AM  
Condition of Edges Open 1/6/2019  7:00 AM  
Tissue Type Black; Yellow 1/6/2019  7:00 AM  
Tissue Type Percent Black 50 % 1/4/2019 10:15 AM  
Tissue Type Percent Yellow 50 1/4/2019 10:15 AM  
Direction of Undermining 1    oclock;2    oclock;3    oclock;4    oclock;5    oclock;6    oclock;7    oclock;8    oclock;9    oclock;10    oclock;11    oclock 1/4/2019 10:15 AM  
Depth of Undermining (cm) 2 1/4/2019 10:15 AM  
Drainage Amount  Small  1/4/2019 10:15 AM  
Drainage Color Serous 1/4/2019 10:15 AM  
Wound Odor Foul 1/6/2019  7:00 AM  
Periwound Skin Condition Intact 1/6/2019  7:00 AM  
Cleansing and Cleansing Agents  Dermal wound cleanser;Betadine 1/6/2019  7:00 AM  
Dressing Type Applied Other (Comment);4 x 4;Gauze wrap (ting); Elastic bandage 1/6/2019  7:00 AM  
Procedure Tolerated Well 1/6/2019  7:00 AM  
Number of days: 4 Assessment:  
 
Principal Problem: 
  GI bleed (1/2/2019) Active Problems: 
  A-fib ([address] (1/2/2019) Hypocalcemia (1/2/2019) Hypokalemia (1/2/2019) PAD (peripheral artery disease) ([address] (1/2/2019) Acute blood loss anemia (1/2/2019) Osteomyelitis of left foot ([address] (1/4/2019) Overview: 1st metatarsal head Plan/Recommendations/Medical Decision Making:  
 
Discussed with the patient all the above findings. Reviewed overall treatment plan. Will reviewed that he will need addition resection of bone at the TMA site  Intra-op possible  further conversion of the TMA site with flap closure. All questions were answered and he indicated that he understood. Will set up for further surgery later today. Dr Newton Payment Podiatric Surgeon [facility]) [phone] 
1/7/2019 
8:41 AM

## 2019-01-07 NOTE — BRIEF OP NOTE
BRIEF OPERATIVE NOTE Date of Procedure: 1/7/2019 Preoperative Diagnosis: Left foot necrotic  non healing wound with dehiscence of TMA surgical site. R/O osteomyelitis. Postoperative Diagnosis: Left foot necrotic  non healing wound with dehiscence of TMA surgical site. R/O osteomyelitis. Procedure(s): LEFT FOOT ADDITION RESECTION OF THE METATARSAL BONES FIRST, SECOND, THIRD, FORTH AND FIFTH AT THE TMA SITE, BONE BIOPSY OF 1ST METATARSAL, CONVERSION OF THE TMA SITE WITH FLAP CLOSURE Surgeon(s) and Role: Lyndon Jean DPM - Primary Surgical Staff: 
Circ-1: Krystyna River Circ-Relief: Christophe Catalan Scrub Tech-1: Amy Savin Surg Asst-1: Yfn Oneill Surg Asst-2: Conception [place] Event Time In Time Out Incision Start 01/07/2019 1435 Incision Close 01/07/2019 1524 Anesthesia: General  
Estimated Blood Loss: minimal 
Specimens:  
ID Type Source Tests Collected by Time Destination 1 : Left foot. 1-5 metatarsals 1st pass  Fresh Foot, left  [facility] 1/7/2019 1443 Pathology 2 : Left foot 2nd pass of 1st metatarsal  Fresh Foot, left  [facility] 1/7/2019 1444 Pathology 1 : Left foot tissue culture  Tissue  CULTURE, ANAEROBIC, CULTURE, TISSUE W Pedro Soares DPM 1/7/2019 1440 Microbiology 2 : Left foot bone culture  Bone Foot, left CULTURE, AEROBIC AND ANAEROBIC, CULTURE [employer] Kiran Soares DPM 1/7/2019 1458 Microbiology Findings: necrotic tissue / bone Complications: none Implants: none Suture 3.0 nylon TLS # 10 Patient tolerated all procedures well and discharged in stable condition with all vascular intact tourniquet never used.

## 2019-01-07 NOTE — PERIOP NOTES
pts arrived to [facility]. Pre procedure checkllist complete. Surgery and anesthesia consent complete. Call bell within reach. Will monitor.

## 2019-01-07 NOTE — ANESTHESIA PREPROCEDURE EVALUATION
Anesthetic History No history of anesthetic complications Review of Systems / Medical History Patient summary reviewed and pertinent labs reviewed Pulmonary Smoker Neuro/Psych Psychiatric history (Depression) Cardiovascular Hypertension: well controlled Dysrhythmias : atrial fibrillation Exercise tolerance: <4 METS 
  
GI/Hepatic/Renal 
Within defined limits Endo/Other Other Findings Physical Exam 
 
Airway Mallampati: II 
TM Distance: 4 - 6 cm Neck ROM: normal range of motion Mouth opening: Normal 
 
 Cardiovascular Regular rate and rhythm,  S1 and S2 normal,  no murmur, click, rub, or gallop Dental 
No notable dental hx Pulmonary Breath sounds clear to auscultation Abdominal 
GI exam deferred Other Findings Anesthetic Plan ASA: 3 Anesthesia type: general 
 
 
 
 
Induction: Intravenous Anesthetic plan and risks discussed with: Patient

## 2019-01-07 NOTE — PROGRESS NOTES
Internal Medicine Progress Note Patient's Name: Dasia Pleitez Admit Date: 1/2/2019 Length of Stay: 5 Assessment/Plan Active Hospital Problems Diagnosis Date Noted  Osteomyelitis of left foot [facility] 01/04/2019 1st metatarsal head  A-fib ([facility] 01/02/2019  GI bleed 01/02/2019  Hypocalcemia 01/02/2019  Hypokalemia 01/02/2019  PAD (peripheral artery disease) ([facility] 01/02/2019  Acute blood loss anemia 01/02/2019  
 
- Cont PO pronix 
- Cont toprol XL for rate control 
- Can restart plavix on discahrge - F/u cardiology to discuss longterm [facility] 
- s/p TMA resection per podiatry - Pain control PRN 
- PT eval and treat per podiatry recs 
- Cont acceptable home medications for chronic conditions  
- DVT protocol I have personally reviewed all pertinent labs and films that have officially resulted over the last 24 hours. I have personally checked for all pending labs that are awaiting final results. Subjective Pt s/e @ bedside No major events overnight LEFT FOOT ADDITION RESECTION OF THE METATARSAL BONES FIRST, SECOND, THIRD, FORTH AND FIFTH AT THE TMA SITE, BONE BIOPSY OF 1ST METATARSAL, CONVERSION OF THE TMA SITE WITH FLAP CLOSURE Denies CP or SOB Objective Visit Vitals /76 Pulse 67 Temp 97.9 °F (36.6 °C) Resp 16 Ht 6' 2\" (1.88 m) Wt 59 kg (130 lb) SpO2 98% BMI 16.69 kg/m² Physical Exam: 
General Appearance: NAD, conversant Lungs: CTA with normal respiratory effort CV: RRR, no m/r/g Abdomen: soft, non-tender, normal bowel sounds Extremities: no cyanosis, LLE at level of foot/ankle in dressing Neuro: No focal deficits, motor/sensory intact Lab/Data Reviewed: 
BMP: No results found for: NA, K, CL, CO2, AGAP, GLU, BUN, CREA, GFRAA, GFRNA 
CBC: No results found for: WBC, HGB, HGBEXT, HCT, HCTEXT, PLT, PLTEXT, HGBEXT, HCTEXT, PLTEXT Imaging Reviewed: 
No results found. Medications Reviewed: Current Facility-Administered Medications Medication Dose Route Frequency  [START ON 1/8/2019] metoprolol succinate (TOPROL-XL) XL tablet 25 mg  25 mg Oral DAILY  lactated Ringers infusion  50 mL/hr IntraVENous CONTINUOUS  
 pantoprazole (PROTONIX) tablet 40 mg  40 mg Oral ACB&D  
 HYDROcodone-acetaminophen (NORCO) 5-325 mg per tablet 1 Tab  1 Tab Oral Q6H PRN  
 acetaminophen (TYLENOL) SR tablet 650 mg  650 mg Oral Q8H  
 0.9% sodium chloride infusion 250 mL  250 mL IntraVENous PRN  
 oxyCODONE-acetaminophen (PERCOCET 7.5) 7.5-325 mg per tablet 1 Tab  1 Tab Oral Q6H PRN  
 gabapentin (NEURONTIN) capsule 1,200 mg  1,200 mg Oral TID  venlafaxine-SR (EFFEXOR-XR) capsule 150 mg  150 mg Oral DAILY WITH BREAKFAST  sodium chloride (NS) flush 5-10 mL  5-10 mL IntraVENous Q8H  
 sodium chloride (NS) flush 5-10 mL  5-10 mL IntraVENous PRN  
 sodium chloride (NS) flush 10-30 mL  10-30 mL InterCATHeter PRN  
 sodium chloride (NS) flush 10 mL  10 mL InterCATHeter Q24H  
 sodium chloride (NS) flush 10 mL  10 mL InterCATHeter PRN  
 sodium chloride (NS) flush 10-40 mL  10-40 mL InterCATHeter Q8H  
 bacitracin 500 unit/gram packet 1 Packet  1 Packet Topical PRN  
 atorvastatin (LIPITOR) tablet 40 mg  40 mg Oral QHS  aspirin tablet 325 mg  325 mg Oral DAILY Jame Goyal DO Internal Medicine, Hospitalist 
Pager: [phone] [facility]

## 2019-01-07 NOTE — PROGRESS NOTES
[facility] - Progress Note Admit Date: 1/2/2019 I saw, evaluated, interviewed and examined the patient personally. I agree with the findings and plan of care as documented below with MAICOL note Remained in sinus rhythm Increase metoprolol for better BP control ASA for stroke prophylaxis. Patient with CHADS2-Vasc score 2, would ideally recommend anticoagulation long term, though this admission he presented with GI bleed, severe ulcerative esophagitis and now plans for OR with podiatry. He can follow up as outpatient for further discussion No further cardiac work up planned at this time unless clinical status changes. Call us back if needed. Will be available as needed. Will need to follow up in cardiology clinic in 3 weeks after discharge. Thank you. Demetrius Espinosa MD 
 
 
 
 
Assessment:  
 
- GI bleed, s/p EGD 1/3/18 that revealed severe ulcerative esophagitis from mid esophagus to distal esophagus s/p biopsy to evaluate for viral process and normal-appearing stomach and duodenum - Paroxysmal atrial fibrillation, CHADS2-Vasc score 2 (HTN, Vascular disease) - Echo 1/3/2019: EF 56-60%, no RWMA, age-appropriate diastolic function, mild mitral regurgitation - PAD with history of transmetatarsal amputation of the left foot - Osteomyelitis of L 1st metatarsal, podiatry assistance appreciated - Hypokalemia - Tobacco use Plan:  
 
- Patient in sinus this AM, would continue with Toprol 
- Continue with ASA and statin - Patient with CHADS2-Vasc score 2, would ideally recommend anticoagulation long term, though this admission he presented with GI bleed, severe esophagitis and now plans for OR with podiatry. He can follow up as outpatient for further discussion - Will sign off from cardiology standpoint and be available as needed. Please call with any questions. Subjective: No new complaints. Objective:  
  
Patient Vitals for the past 8 hrs: 
 Temp Pulse Resp BP SpO2 01/07/19 0839 97.8 °F (36.6 °C) 72 20 154/88 97 % 01/07/19 0559 98.7 °F (37.1 °C) 86 18 120/53 97 % No data found. Intake/Output Summary (Last 24 hours) at 1/7/2019 1054 Last data filed at 1/7/2019 1013 Gross per 24 hour Intake 1527.92 ml Output 2025 ml Net -497.08 ml Physical Exam: 
General:  alert, cooperative, no distress Neck:  nontender, no JVD Lungs:  clear to auscultation bilaterally Heart:  regular rate and rhythm, S1, S2 normal, no murmur, click, rub or gallop Abdomen:  abdomen is soft without significant tenderness, masses, organomegaly or guarding Extremities:  no edema, prior left TMA Data Review:  
 
Labs: Results:  
   
Chemistry Recent Labs 01/05/19 
0330 *   
K 3.7  CO2 27 BUN 10  
CREA 0.91  
CA 7.8* AGAP 8  
BUCR 11* CBC w/Diff Recent Labs 01/06/19 
0415 01/05/19 
0330 WBC 9.0 7.5  
RBC 3.02* 3.02* HGB 9.2* 9.0*  
HCT 27.2* 26.7*  
 198 GRANS 53 50 LYMPH 24 27 EOS 13* 13* Cardiac Enzymes No results found for: CPK, CK, CKMMB, CKMB, RCK3, CKMBT, CKNDX, CKND1, ULI, TROPT, TROIQ, RAJEEV, TROPT, TNIPOC, BNP, BNPP Coagulation No results for input(s): PTP, INR, APTT in the last 72 hours. No lab exists for component: INREXT Lipid Panel No results found for: CHOL, CHOLPOCT, CHOLX, CHLST, CHOLV, 203196, HDL, LDL, LDLC, DLDLP, 819992, VLDLC, VLDL, TGLX, TRIGL, TRIGP, TGLPOCT, CHHD, CHHDX  
BNP No results found for: BNP, BNPP, XBNPT Liver Enzymes No results for input(s): TP, ALB, TBIL, AP, SGOT, GPT in the last 72 hours. No lab exists for component: DBIL Digoxin Thyroid Studies No results found for: T4, T3U, TSH, TSHEXT Signed By: Ramón Curtis. Luis Tidwell PA-C January 7, 2019

## 2019-01-07 NOTE — PROGRESS NOTES
conducted a Follow up consultation and Spiritual Assessment for Junior Cid, who is a 63 y. o.,male. The  provided the following Interventions: 
Continued the relationship of care and support. Listened empathically. Offered prayer and assurance of continued prayer on patients behalf. Chart reviewed. The following outcomes were achieved: 
Patient expressed gratitude for pastoral care visit. Assessment: 
There are no further spiritual or Denominational issues which require Spiritual Care Services interventions at this time. Plan: 
Chaplains will continue to follow and will provide pastoral care on an as needed/requested basis.  recommends bedside caregivers page  on duty if patient shows signs of acute spiritual or emotional distress. [address] [facility] Staff  Spiritual Care  
[phone]

## 2019-01-07 NOTE — ANESTHESIA POSTPROCEDURE EVALUATION
Procedure(s): LEFT FOOT ADDITION RESECTION OF THE METATARSAL BONES FIRST, SECOND, THIRD, FORTH AND FIFTH AT THE TMA SITE, BONE BIOPSY OF 1ST METATARSAL, CONVERSION OF THE TMA SITE WITH FLAP CLOSURE. Anesthesia Post Evaluation Multimodal analgesia: multimodal analgesia not used between 6 hours prior to anesthesia start to PACU discharge Patient location during evaluation: PACU Patient participation: complete - patient participated Level of consciousness: awake and alert Pain management: satisfactory to patient Airway patency: patent Anesthetic complications: no 
Cardiovascular status: acceptable Respiratory status: acceptable Hydration status: acceptable Post anesthesia nausea and vomiting:  none Visit Vitals /64 Pulse 63 Temp 36.1 °C (97 °F) Resp 16 Ht 6' 2\" (1.88 m) Wt 59 kg (130 lb) SpO2 100% BMI 16.69 kg/m²

## 2019-01-07 NOTE — PROGRESS NOTES
Chart reviewed. Plan remains going [email or web address], will need [place] cab ride. Will cont to follow for further needs. Magali Monet RN,ext [phone].

## 2019-01-08 LAB
ANION GAP SERPL CALC-SCNC: 7 MMOL/L (ref 3–18)
BASOPHILS # BLD: 0.2 K/UL (ref 0–0.1)
BASOPHILS NFR BLD: 2 % (ref 0–2)
BUN SERPL-MCNC: 12 MG/DL (ref 7–18)
BUN/CREAT SERPL: 12 (ref 12–20)
CALCIUM SERPL-MCNC: 8.4 MG/DL (ref 8.5–10.1)
CHLORIDE SERPL-SCNC: 104 MMOL/L (ref 100–108)
CO2 SERPL-SCNC: 29 MMOL/L (ref 21–32)
CREAT SERPL-MCNC: 0.97 MG/DL (ref 0.6–1.3)
CRP SERPL HS-MCNC: 5.21 MG/L (ref 0–3)
DIFFERENTIAL METHOD BLD: ABNORMAL
EOSINOPHIL # BLD: 1.1 K/UL (ref 0–0.4)
EOSINOPHIL NFR BLD: 9 % (ref 0–5)
ERYTHROCYTE [DISTWIDTH] IN BLOOD BY AUTOMATED COUNT: 15 % (ref 11.6–14.5)
GLUCOSE SERPL-MCNC: 98 MG/DL (ref 74–99)
HCT VFR BLD AUTO: 27.1 % (ref 36–48)
HGB BLD-MCNC: 8.9 G/DL (ref 13–16)
LYMPHOCYTES # BLD: 1.7 K/UL (ref 0.9–3.6)
LYMPHOCYTES NFR BLD: 14 % (ref 21–52)
MCH RBC QN AUTO: 30 PG (ref 24–34)
MCHC RBC AUTO-ENTMCNC: 32.8 G/DL (ref 31–37)
MCV RBC AUTO: 91.2 FL (ref 74–97)
MONOCYTES # BLD: 1.2 K/UL (ref 0.05–1.2)
MONOCYTES NFR BLD: 10 % (ref 3–10)
NEUTS SEG # BLD: 7.8 K/UL (ref 1.8–8)
NEUTS SEG NFR BLD: 65 % (ref 40–73)
PLATELET # BLD AUTO: 309 K/UL (ref 135–420)
PMV BLD AUTO: 10.3 FL (ref 9.2–11.8)
POTASSIUM SERPL-SCNC: 4.3 MMOL/L (ref 3.5–5.5)
RBC # BLD AUTO: 2.97 M/UL (ref 4.7–5.5)
SODIUM SERPL-SCNC: 140 MMOL/L (ref 136–145)
WBC # BLD AUTO: 12 K/UL (ref 4.6–13.2)

## 2019-01-08 PROCEDURE — 74011250637 HC RX REV CODE- 250/637: Performed by: HOSPITALIST

## 2019-01-08 PROCEDURE — 74011250636 HC RX REV CODE- 250/636: Performed by: HOSPITALIST

## 2019-01-08 PROCEDURE — 74011250637 HC RX REV CODE- 250/637: Performed by: NURSE PRACTITIONER

## 2019-01-08 PROCEDURE — 74011250636 HC RX REV CODE- 250/636: Performed by: ANESTHESIOLOGY

## 2019-01-08 PROCEDURE — 85025 COMPLETE CBC W/AUTO DIFF WBC: CPT

## 2019-01-08 PROCEDURE — 74011250637 HC RX REV CODE- 250/637: Performed by: INTERNAL MEDICINE

## 2019-01-08 PROCEDURE — 80048 BASIC METABOLIC PNL TOTAL CA: CPT

## 2019-01-08 PROCEDURE — 74011000258 HC RX REV CODE- 258: Performed by: HOSPITALIST

## 2019-01-08 PROCEDURE — 36415 COLL VENOUS BLD VENIPUNCTURE: CPT

## 2019-01-08 PROCEDURE — 36592 COLLECT BLOOD FROM PICC: CPT

## 2019-01-08 PROCEDURE — 65660000000 HC RM CCU STEPDOWN

## 2019-01-08 RX ORDER — OXYCODONE HYDROCHLORIDE 5 MG/1
10-15 TABLET ORAL
Status: DISCONTINUED | OUTPATIENT
Start: 2019-01-08 | End: 2019-01-11 | Stop reason: HOSPADM

## 2019-01-08 RX ORDER — SENNOSIDES 8.6 MG/1
2 TABLET ORAL
Status: DISCONTINUED | OUTPATIENT
Start: 2019-01-08 | End: 2019-01-11 | Stop reason: HOSPADM

## 2019-01-08 RX ORDER — NICOTINE 7MG/24HR
1 PATCH, TRANSDERMAL 24 HOURS TRANSDERMAL DAILY
Status: DISCONTINUED | OUTPATIENT
Start: 2019-01-08 | End: 2019-01-10 | Stop reason: SDUPTHER

## 2019-01-08 RX ADMIN — Medication 10 ML: at 21:00

## 2019-01-08 RX ADMIN — Medication 10 ML: at 16:43

## 2019-01-08 RX ADMIN — PANTOPRAZOLE SODIUM 40 MG: 40 TABLET, DELAYED RELEASE ORAL at 16:41

## 2019-01-08 RX ADMIN — PANTOPRAZOLE SODIUM 40 MG: 40 TABLET, DELAYED RELEASE ORAL at 09:21

## 2019-01-08 RX ADMIN — OXYCODONE HYDROCHLORIDE 15 MG: 5 TABLET ORAL at 21:03

## 2019-01-08 RX ADMIN — ATORVASTATIN CALCIUM 40 MG: 40 TABLET, FILM COATED ORAL at 21:00

## 2019-01-08 RX ADMIN — VENLAFAXINE HYDROCHLORIDE 150 MG: 150 CAPSULE, EXTENDED RELEASE ORAL at 09:21

## 2019-01-08 RX ADMIN — METOPROLOL SUCCINATE 25 MG: 25 TABLET, EXTENDED RELEASE ORAL at 09:21

## 2019-01-08 RX ADMIN — GABAPENTIN 1200 MG: 400 CAPSULE ORAL at 21:00

## 2019-01-08 RX ADMIN — PIPERACILLIN SODIUM,TAZOBACTAM SODIUM 3.38 G: 3; .375 INJECTION, POWDER, FOR SOLUTION INTRAVENOUS at 16:42

## 2019-01-08 RX ADMIN — OXYCODONE HYDROCHLORIDE AND ACETAMINOPHEN 2 TABLET: 7.5; 325 TABLET ORAL at 01:09

## 2019-01-08 RX ADMIN — GABAPENTIN 1200 MG: 400 CAPSULE ORAL at 09:21

## 2019-01-08 RX ADMIN — SODIUM CHLORIDE, SODIUM LACTATE, POTASSIUM CHLORIDE, AND CALCIUM CHLORIDE 50 ML/HR: 600; 310; 30; 20 INJECTION, SOLUTION INTRAVENOUS at 03:00

## 2019-01-08 RX ADMIN — PIPERACILLIN SODIUM,TAZOBACTAM SODIUM 3.38 G: 3; .375 INJECTION, POWDER, FOR SOLUTION INTRAVENOUS at 20:54

## 2019-01-08 RX ADMIN — OXYCODONE HYDROCHLORIDE AND ACETAMINOPHEN 2 TABLET: 7.5; 325 TABLET ORAL at 05:37

## 2019-01-08 RX ADMIN — ASPIRIN 325 MG ORAL TABLET 325 MG: 325 PILL ORAL at 09:21

## 2019-01-08 RX ADMIN — Medication 10 ML: at 05:29

## 2019-01-08 RX ADMIN — OXYCODONE HYDROCHLORIDE AND ACETAMINOPHEN 2 TABLET: 7.5; 325 TABLET ORAL at 09:21

## 2019-01-08 RX ADMIN — Medication 40 ML: at 05:28

## 2019-01-08 NOTE — PROGRESS NOTES
Internal Medicine Progress Note Patient's Name: Tonie Talbert Admit Date: 1/2/2019 Length of Stay: 6 Assessment/Plan Active Hospital Problems Diagnosis Date Noted  Osteomyelitis of left foot ([facility] [address] [address] 01/04/2019 1st metatarsal head  [facility] 01/02/2019  GI bleed 01/02/2019  Hypocalcemia 01/02/2019  Hypokalemia 01/02/2019  PAD (peripheral artery disease) ([facility] 01/02/2019  Acute blood loss anemia 01/02/2019 Underweight with Bmi <19 Moderate malnutrition 
 
- Cont PO pronix 
- Cont toprol XL for rate control 
- Can restart plavix on discharge - F/u cardiology to discuss longterm AC 
- IVAB w/ transition to oral for total 4-5 days post clean margins - s/p TMA resection per podiatry - Cont zosyn for GNR organisms 
- PT w/ non wt bearing training 
- Pain control PRN 
- Wound care - Cont acceptable home medications for chronic conditions  
- DVT protocol I have personally reviewed all pertinent labs and films that have officially resulted over the last 24 hours. I have personally checked for all pending labs that are awaiting final results. Subjective Pt s/e @ bedside No major events overnight C/o foot pain not tolerable with meds Apparently put weight on surgical foot Denies CP or SOB Objective Visit Vitals /68 Pulse 84 Temp 98.8 °F (37.1 °C) Resp 20 Ht 6' 2\" (1.88 m) Wt 59 kg (130 lb) SpO2 100% BMI 16.69 kg/m² Physical Exam: 
General Appearance: NAD, conversant Lungs: CTA with normal respiratory effort CV: RRR, no m/r/g Abdomen: soft, non-tender, normal bowel sounds Extremities: no cyanosis, LLE at level of foot/ankle in dressing Neuro: No focal deficits, motor/sensory intact Lab/Data Reviewed: 
BMP:  
Lab Results Component Value Date/Time   01/08/2019 05:30 AM  
 K 4.3 01/08/2019 05:30 AM  
  01/08/2019 05:30 AM  
 CO2 29 01/08/2019 05:30 AM  
 AGAP 7 01/08/2019 05:30 AM  
 GLU 98 01/08/2019 05:30 AM  
 BUN 12 01/08/2019 05:30 AM  
 CREA 0.97 01/08/2019 05:30 AM  
 GFRAA >60 01/08/2019 05:30 AM  
 GFRNA >60 01/08/2019 05:30 AM  
 
CBC:  
Lab Results Component Value Date/Time WBC 12.0 01/08/2019 05:30 AM  
 HGB 8.9 (L) 01/08/2019 05:30 AM  
 HCT 27.1 (L) 01/08/2019 05:30 AM  
  01/08/2019 05:30 AM  
 
 
Imaging Reviewed: 
No results found. Medications Reviewed: 
Current Facility-Administered Medications Medication Dose Route Frequency  oxyCODONE IR (ROXICODONE) tablet 10-15 mg  10-15 mg Oral Q4H PRN  
 metoprolol succinate (TOPROL-XL) XL tablet 25 mg  25 mg Oral DAILY  lactated Ringers infusion  50 mL/hr IntraVENous CONTINUOUS  
 pantoprazole (PROTONIX) tablet 40 mg  40 mg Oral ACB&D  
 0.9% sodium chloride infusion 250 mL  250 mL IntraVENous PRN  
 gabapentin (NEURONTIN) capsule 1,200 mg  1,200 mg Oral TID  venlafaxine-SR (EFFEXOR-XR) capsule 150 mg  150 mg Oral DAILY WITH BREAKFAST  sodium chloride (NS) flush 5-10 mL  5-10 mL IntraVENous Q8H  
 sodium chloride (NS) flush 5-10 mL  5-10 mL IntraVENous PRN  
 sodium chloride (NS) flush 10-30 mL  10-30 mL InterCATHeter PRN  
 sodium chloride (NS) flush 10 mL  10 mL InterCATHeter Q24H  
 sodium chloride (NS) flush 10 mL  10 mL InterCATHeter PRN  
 sodium chloride (NS) flush 10-40 mL  10-40 mL InterCATHeter Q8H  
 bacitracin 500 unit/gram packet 1 Packet  1 Packet Topical PRN  
 atorvastatin (LIPITOR) tablet 40 mg  40 mg Oral QHS  aspirin tablet 325 mg  325 mg Oral DAILY Laurie Mccarthy DO Internal Medicine, Hospitalist 
Pager: [phone] [facility]

## 2019-01-08 NOTE — PROGRESS NOTES
Problem: Falls - Risk of 
Goal: *Absence of Falls Document Troyngozierin CharlesBeaver Fall Risk and appropriate interventions in the flowsheet. Outcome: Progressing Towards Goal 
Fall Risk Interventions: 
Mobility Interventions: Patient to call before getting OOB Medication Interventions: Patient to call before getting OOB Elimination Interventions: Patient to call for help with toileting needs History of Falls Interventions: Door open when patient unattended Problem: Pressure Injury - Risk of 
Goal: *Prevention of pressure injury Document Hal Scale and appropriate interventions in the flowsheet. Outcome: Progressing Towards Goal 
Pressure Injury Interventions: 
Sensory Interventions: Assess changes in LOC, Check visual cues for pain, Keep linens dry and wrinkle-free Activity Interventions: Pressure redistribution bed/mattress(bed type) Mobility Interventions: Pressure redistribution bed/mattress (bed type) Nutrition Interventions: Document food/fluid/supplement intake

## 2019-01-08 NOTE — PROGRESS NOTES
Problem: Falls - Risk of 
Goal: *Absence of Falls Document Veronika President Fall Risk and appropriate interventions in the flowsheet. Outcome: Progressing Towards Goal 
Fall Risk Interventions: 
Mobility Interventions: Patient to call before getting OOB, PT Consult for mobility concerns Medication Interventions: Patient to call before getting OOB, Teach patient to arise slowly Elimination Interventions: Patient to call for help with toileting needs, Call light in reach History of Falls Interventions: Door open when patient unattended, Room close to nurse's station Problem: Pressure Injury - Risk of 
Goal: *Prevention of pressure injury Document Hal Scale and appropriate interventions in the flowsheet. Outcome: Progressing Towards Goal 
Pressure Injury Interventions: 
Sensory Interventions: Assess changes in LOC, Check visual cues for pain, Minimize linen layers, Maintain/enhance activity level, Keep linens dry and wrinkle-free Activity Interventions: Pressure redistribution bed/mattress(bed type) Mobility Interventions: Pressure redistribution bed/mattress (bed type), HOB 30 degrees or less Nutrition Interventions: Document food/fluid/supplement intake

## 2019-01-08 NOTE — PROGRESS NOTES
1340: PT orders received and chart reviewed. Attempted PT evaluation, patient declining to participate with PT. Requesting PT to come back tomorrow AM. Will f/u with patient. Marcelo Platt PT, DPT Office extension: [phone] Pager #: [phone]

## 2019-01-08 NOTE — PROGRESS NOTES
1915  Bedside and Verbal shift change report given to Brittany (oncoming nurse) by Bethany Funes, RN 
(offgoing nurse). Report included the following information SBAR, Kardex, MAR and Recent Results. Pt in bed resting, noted with breakthrough bleeding to left foot. 2025  Shift assessment completed. Reports pain, resting in bed. Unable to give PRN Percocet due to system alert of Acetaminophen dose of 3525 mg in the past 24 hours. Paged Dr Francisco Capone. 2050  Telephone order received from Dr Francisco Capone to dc Tylenol and give PRN Percocet. 2115  Dr Francisco Capone on the floor,informed him of system alert. States that it is okay to dc scheduled Tylenol for now and give q4 PRN Percocet. Pt keeps hopping to the bathroom, advised to stay in bed and keep foot elevated; verbalized understanding. 0110  PRN Pain med given, pt states that pain med not helping with his pain. States that when he previously had a similar surgery he was Morphine and other opioids. States that he would talk to the surgeon or podiatrist in the a.m. PRN Percocet given for pain. Pt does not look like he is in pain though. 5907  Dr Sade Leal at bedside for dressing change. Bedside shift change report given to Shameka (oncoming nurse) by Jessi Hernadez (offgoing nurse). Report included the following information SBAR, Kardex, Intake/Output and MAR.

## 2019-01-08 NOTE — PROGRESS NOTES
Problem: Falls - Risk of 
Goal: *Absence of Falls Document Fareed Mueller Fall Risk and appropriate interventions in the flowsheet. Outcome: Progressing Towards Goal 
Fall Risk Interventions: 
Mobility Interventions: Patient to call before getting OOB Medication Interventions: Patient to call before getting OOB Elimination Interventions: Patient to call for help with toileting needs History of Falls Interventions: Door open when patient unattended Problem: Pressure Injury - Risk of 
Goal: *Prevention of pressure injury Document Hal Scale and appropriate interventions in the flowsheet. Outcome: Progressing Towards Goal 
Pressure Injury Interventions: 
Sensory Interventions: Assess changes in LOC, Check visual cues for pain, Keep linens dry and wrinkle-free Activity Interventions: Pressure redistribution bed/mattress(bed type) Mobility Interventions: Pressure redistribution bed/mattress (bed type) Nutrition Interventions: Document food/fluid/supplement intake

## 2019-01-08 NOTE — PROGRESS NOTES
Bedside shift change report given to HUNTER Myles (oncoming nurse) by Shaan Berman RN (offgoing nurse). Report included the following information SBAR, Kardex, Intake/Output, MAR and Recent Results. 
   
8600 -- AM medications given, well tolerated, will continue to monitor. Pain level 10 out of 10, pain med given. 1000 -- Pain reassessed, patient is resting, will continue to monitor. 
   
1146 -- Reassessment completed, no change in patient condition, will continue to monitor. 1525 -- Patient returning to the unit after being told not to leave, patient states to Josette York (director) and Shameka RN, \"I left the unit to smoke marijuana and cigarettes. I won't do it again. \" Dr. Rebecca Mast called further orders to give nicotine patch 7 mg. Reassessment completed, no change in patient condition, will continue to monitor. 
   
Bedside shift change report given to Black Paige, [address]) by Sybil Mullen RN (offgoing nurse). Report included the following information SBAR, Kardex, Intake/Output, MAR and Recent Results.

## 2019-01-08 NOTE — PROGRESS NOTES
Podiatry Surgery Progress Note Patient: Meredith Tolbert MRN: [id number] LOS: 6 YOB: 1960  Age: 63 y.o. Sex: male Admit Date: 1/2/2019 POD 1 Day Post-Op Procedure:   Procedure(s): LEFT FOOT ADDITION RESECTION OF THE METATARSAL BONES FIRST, SECOND, THIRD, FORTH AND FIFTH AT THE TMA SITE, BONE BIOPSY OF 1ST METATARSAL, CONVERSION OF THE TMA SITE WITH FLAP CLOSURE Subjective:  
Meredith Tolbert is a 62 y/o male seen at bedside today  state over all feeling / doing fine. Patient is  currently resting quiet and no apparent distress. Review of Systems:   
  
Positives in [place]** General: denies chronic fatigue, weight loss, fever, anemia, bruising, depression, nervousness, panic attacks HEENT: denies ringing in ears,  dizzy spells, poor vision, glaucoma, sinus trouble, hoarseness GI:  denies bloating, heartburn, regurgitation, difficulty swallowing, painful swallowing, nausea, abdominal pain, decreased appetite Lungs: denies pneumonia, asthma, cough, SOB, hemoptysis Heart: denies chest pain, irregular heart beat Skin: denies rashes, hives, allergic reaction (left foot noted as above) Urinary: denies UTI, kidney stones,  blood in urine, Bones and Joints: denies arthritis, rheumatism, back pain, gout, osteoporosis Neurologic: denies headaches, numbness, tingling Endocrine: No Abnormal Findings Allergic/Immunologic:  No Abnormal Findings. Objective:  
 
Vitals Patient Vitals for the past 8 hrs: 
 BP Temp Pulse Resp SpO2  
01/08/19 1113 145/68 98.8 °F (37.1 °C) 84 20 100 % 01/08/19 0729 128/90 98.3 °F (36.8 °C) 84 20 98 % I/O Current Shift 
01/08 0701 - 01/08 1900 In: 600 [P.O.:600] Out: 800 [Urine:800] I/O Last Three Shifts 01/06 1901 - 01/08 0700 In: 2638.3 [P.O.:760; I.V.:1878.3] Out: 9284 [QODNY:4510; Drains:18] Data Review Recent Results (from the past 24 hour(s)) CULTURE, ANAEROBIC  Collection Time: 01/07/19  2:35 PM  
 Result Value Ref Range Special Requests: NO SPECIAL REQUESTS Culture result: CULTURE IN PROGRESS,FURTHER UPDATES TO FOLLOW CULTURE, TISSUE W GRAM STAIN Collection Time: 01/07/19  2:40 PM  
Result Value Ref Range Special Requests: NO SPECIAL REQUESTS    
 GRAM STAIN RARE WBC'S    
 GRAM STAIN RARE GRAM POSITIVE COCCI IN PAIRS    
 GRAM STAIN RARE GRAM POSITIVE RODS Culture result: FEW GRAM NEGATIVE RODS (A) Culture result: FEW 2ND GRAM NEGATIVE TAMEKA (A) Culture result: FEW POSSIBLE STREPTOCOCCI,NON HEMOLYTIC (A) CULTURE, ANAEROBIC Collection Time: 01/07/19  2:58 PM  
Result Value Ref Range Special Requests: NO SPECIAL REQUESTS Culture result: CULTURE IN PROGRESS,FURTHER UPDATES TO FOLLOW CULTURE, TISSUE W GRAM STAIN Collection Time: 01/07/19  2:58 PM  
Result Value Ref Range Special Requests: NO SPECIAL REQUESTS    
 GRAM STAIN NO ORGANISMS SEEN    
 GRAM STAIN NO WBC'S SEEN Culture result: RARE GRAM NEGATIVE RODS (A) Culture result: RARE 2ND GRAM NEGATIVE TAMEKA (A) CBC WITH AUTOMATED DIFF Collection Time: 01/08/19  5:30 AM  
Result Value Ref Range WBC 12.0 4.6 - 13.2 K/uL  
 RBC 2.97 (L) 4.70 - 5.50 M/uL HGB 8.9 (L) 13.0 - 16.0 g/dL HCT 27.1 (L) 36.0 - 48.0 % MCV 91.2 74.0 - 97.0 FL  
 MCH 30.0 24.0 - 34.0 PG  
 MCHC 32.8 31.0 - 37.0 g/dL  
 RDW 15.0 (H) 11.6 - 14.5 % PLATELET 449 635 - 492 K/uL MPV 10.3 9.2 - 11.8 FL  
 NEUTROPHILS 65 40 - 73 % LYMPHOCYTES 14 (L) 21 - 52 % MONOCYTES 10 3 - 10 % EOSINOPHILS 9 (H) 0 - 5 % BASOPHILS 2 0 - 2 %  
 ABS. NEUTROPHILS 7.8 1.8 - 8.0 K/UL  
 ABS. LYMPHOCYTES 1.7 0.9 - 3.6 K/UL  
 ABS. MONOCYTES 1.2 0.05 - 1.2 K/UL  
 ABS. EOSINOPHILS 1.1 (H) 0.0 - 0.4 K/UL  
 ABS. BASOPHILS 0.2 (H) 0.0 - 0.1 K/UL  
 DF AUTOMATED METABOLIC PANEL, BASIC Collection Time: 01/08/19  5:30 AM  
Result Value Ref Range Sodium 140 136 - 145 mmol/L  Potassium 4.3 3.5 - 5.5 mmol/L  
 Chloride 104 100 - 108 mmol/L  
 CO2 29 21 - 32 mmol/L Anion gap 7 3.0 - 18 mmol/L Glucose 98 74 - 99 mg/dL BUN 12 7.0 - 18 MG/DL Creatinine 0.97 0.6 - 1.3 MG/DL  
 BUN/Creatinine ratio 12 12 - 20 GFR est AA >60 >60 ml/min/1.73m2 GFR est non-AA >60 >60 ml/min/1.73m2 Calcium 8.4 (L) 8.5 - 10.1 MG/DL Physical Exam: 
 
Ion Hannah  is a 63 y.o. male who is pleasant, alert and oriented x3, in no apparent distress. Patient is well-developed and nourished, with good attention to hygiene and body habitus. Mood and affect normal, appropriate to situation. 
  
Left foot; DSD intact with moderate bloody drainage. Sutures intact without gap but with noted blood clot along the medial incision. There is diffuse edema without an increase in temp to the tonja wound area. No ascending lymphangitis. No active bloody drainage noted NO odor. Noted blood marks on floor around the bed - suggestive of patient walking on the foot around the bed.  
  
  
Vascular Exam:  
Dorsalis Pedis 1/4 and Posterior Tibial 2/4  Bilaterally. Skin temp warm to warm. Pedal hair is decreased. There are not varicosities present.  
  
PVL study: KETURAH - resting KETURAH is normal 
No evidence of significant arterial disease in the lower extremity bilaterally at rest, with the exception of isolated posterior tibial disease bilaterally. 
  
Neurological Exam:  
Light touch protective sensation is absent to both feet. There is noted Loss of protective sensation Musculoskeletal Exam: Muscle tone is normal for age. The muscle strength is 5/5 for the flexors, extensors, inverters, and everter's. 
  
Dermatological Exam:  
Skin is of abnormal texture and turgor with some atrophic skin changes noting decreased hair growth, nail changes (thickening), pigmentary changes, skin texture (thin,shiney), skin color (rubor, red) . 
  
Radiographic: PRE OP (see report for details) Left foot - left forefoot with subtle osseous erosions involving the distal margin of the first metatarsal head compatible with 
osteomyelitis Wound Presentation: Wound Toe (specify in comments) Anterior; Left (Active) DRESSING STATUS Clean, dry, and intact 1/7/2019 10:21 AM  
DRESSING TYPE Elastic bandage 1/7/2019 10:21 AM  
Non-Pressure Injury Full thickness (subcut/muscle) 1/6/2019  7:00 AM  
Wound Length (cm) 6.9 cm 1/4/2019 10:15 AM  
Wound Width (cm) 5 cm 1/4/2019 10:15 AM  
Wound Depth (cm) 1 1/4/2019 10:15 AM  
Wound Surface area (cm^2) 34.5 cm^2 1/4/2019 10:15 AM  
Condition of Base Eschar;Slough 1/7/2019 10:21 AM  
Condition of Edges Open 1/6/2019  7:00 AM  
Tissue Type Black; Yellow 1/7/2019 10:21 AM  
Tissue Type Percent Black 50 % 1/4/2019 10:15 AM  
Tissue Type Percent Yellow 50 1/4/2019 10:15 AM  
Direction of Undermining 1    oclock;2    oclock;3    oclock;4    oclock;5    oclock;6    oclock;7    oclock;8    oclock;9    oclock;10    oclock;11    oclock 1/4/2019 10:15 AM  
Depth of Undermining (cm) 2 1/4/2019 10:15 AM  
Drainage Amount  Small  1/4/2019 10:15 AM  
Drainage Color Serous 1/4/2019 10:15 AM  
Wound Odor Foul 1/7/2019 10:21 AM  
Periwound Skin Condition Intact 1/7/2019 10:21 AM  
Cleansing and Cleansing Agents  Dermal wound cleanser;Betadine 1/6/2019  7:00 AM  
Dressing Type Applied Other (Comment);4 x 4;Gauze wrap (ting); Elastic bandage 1/6/2019  7:00 AM  
Procedure Tolerated Well 1/6/2019  7:00 AM  
Number of days: 5 Wound Foot Left (Active) DRESSING STATUS Clean, dry, and intact 1/7/2019  3:56 PM  
DRESSING TYPE 4 x 4;Elastic bandage 1/7/2019  3:56 PM  
Number of days: 1 Assessment:  
 
Principal Problem: 
  GI bleed (1/2/2019) Active Problems: 
  A-fib ([address] (1/2/2019) Hypocalcemia (1/2/2019) Hypokalemia (1/2/2019) PAD (peripheral artery disease) ([facility] (1/2/2019) Acute blood loss anemia (1/2/2019) Osteomyelitis of left foot ([facility] [address] (1/4/2019) Overview: 1st metatarsal head Plan/Recommendations/Medical Decision Making:  
 
Reviewed once again all the intra-op findings along with all the above today's clinical findings. Discussed in great details the bloody marks on the floor by bedside indicated that he has been putting pressure on the surgical foot. Told him again, just like we discussed pre-op he must NOT put any wt on the foot - by doing such he puts himself a higher risk of not healing and going onto a BKA. Again he indicated that he understood (just like he did pre-op) and would stay NON WT bearing of the left surgical foot. Consult PT for NON wt bearing training with crutches and / or walker - transfer to toilet and bedside chair. DSD change with betadine sterile gauze and Kerlix. Continue LWC and IVabx Intra op path along with tissue and bone C&S pending. Dr Soha Odom Podiatric Surgeon [facility]) [phone] 
1/8/2019 
1:26 PM

## 2019-01-09 PROCEDURE — 74011250636 HC RX REV CODE- 250/636: Performed by: HOSPITALIST

## 2019-01-09 PROCEDURE — 74011250637 HC RX REV CODE- 250/637: Performed by: HOSPITALIST

## 2019-01-09 PROCEDURE — 74011250637 HC RX REV CODE- 250/637: Performed by: NURSE PRACTITIONER

## 2019-01-09 PROCEDURE — C1751 CATH, INF, PER/CENT/MIDLINE: HCPCS

## 2019-01-09 PROCEDURE — 97162 PT EVAL MOD COMPLEX 30 MIN: CPT

## 2019-01-09 PROCEDURE — 77030022017 HC DRSG HEMO QCLOT ZMED -A

## 2019-01-09 PROCEDURE — 97116 GAIT TRAINING THERAPY: CPT

## 2019-01-09 PROCEDURE — 74011000258 HC RX REV CODE- 258: Performed by: HOSPITALIST

## 2019-01-09 PROCEDURE — 74011250637 HC RX REV CODE- 250/637: Performed by: INTERNAL MEDICINE

## 2019-01-09 PROCEDURE — 65660000000 HC RM CCU STEPDOWN

## 2019-01-09 RX ADMIN — Medication 10 ML: at 15:13

## 2019-01-09 RX ADMIN — GABAPENTIN 1200 MG: 400 CAPSULE ORAL at 16:11

## 2019-01-09 RX ADMIN — OXYCODONE HYDROCHLORIDE 15 MG: 5 TABLET ORAL at 09:23

## 2019-01-09 RX ADMIN — Medication 10 ML: at 22:12

## 2019-01-09 RX ADMIN — ATORVASTATIN CALCIUM 40 MG: 40 TABLET, FILM COATED ORAL at 22:11

## 2019-01-09 RX ADMIN — OXYCODONE HYDROCHLORIDE 15 MG: 5 TABLET ORAL at 01:03

## 2019-01-09 RX ADMIN — GABAPENTIN 1200 MG: 400 CAPSULE ORAL at 09:23

## 2019-01-09 RX ADMIN — PANTOPRAZOLE SODIUM 40 MG: 40 TABLET, DELAYED RELEASE ORAL at 16:11

## 2019-01-09 RX ADMIN — VENLAFAXINE HYDROCHLORIDE 150 MG: 150 CAPSULE, EXTENDED RELEASE ORAL at 09:23

## 2019-01-09 RX ADMIN — PIPERACILLIN SODIUM,TAZOBACTAM SODIUM 3.38 G: 3; .375 INJECTION, POWDER, FOR SOLUTION INTRAVENOUS at 11:13

## 2019-01-09 RX ADMIN — OXYCODONE HYDROCHLORIDE 15 MG: 5 TABLET ORAL at 17:59

## 2019-01-09 RX ADMIN — OXYCODONE HYDROCHLORIDE 15 MG: 5 TABLET ORAL at 22:11

## 2019-01-09 RX ADMIN — ASPIRIN 325 MG ORAL TABLET 325 MG: 325 PILL ORAL at 09:23

## 2019-01-09 RX ADMIN — Medication 10 ML: at 09:24

## 2019-01-09 RX ADMIN — METOPROLOL SUCCINATE 25 MG: 25 TABLET, EXTENDED RELEASE ORAL at 09:23

## 2019-01-09 RX ADMIN — PIPERACILLIN SODIUM,TAZOBACTAM SODIUM 3.38 G: 3; .375 INJECTION, POWDER, FOR SOLUTION INTRAVENOUS at 18:00

## 2019-01-09 RX ADMIN — PANTOPRAZOLE SODIUM 40 MG: 40 TABLET, DELAYED RELEASE ORAL at 09:23

## 2019-01-09 RX ADMIN — OXYCODONE HYDROCHLORIDE 15 MG: 5 TABLET ORAL at 13:40

## 2019-01-09 RX ADMIN — GABAPENTIN 1200 MG: 400 CAPSULE ORAL at 22:11

## 2019-01-09 RX ADMIN — OXYCODONE HYDROCHLORIDE 15 MG: 5 TABLET ORAL at 04:51

## 2019-01-09 RX ADMIN — Medication 10 ML: at 22:11

## 2019-01-09 RX ADMIN — PIPERACILLIN SODIUM,TAZOBACTAM SODIUM 3.38 G: 3; .375 INJECTION, POWDER, FOR SOLUTION INTRAVENOUS at 03:15

## 2019-01-09 NOTE — CDMP QUERY
The medical record reflects the following: 
Risk:  64 yo with nonhealing surgical wound with dehiscence, homelessness Clinical Indicators: 
--1/3 RD:  \"Weight status is classified as normal per BMI of 19.8. However, patient is 84% IBW and with 19%unintentional weight loss over past 1.5 mth and 9% weight loss over past 6 months per documented weights. PO intake is poor. Added Ensure TID for additional calories/protein Meets Criteria for Chronic Malnutrition Severe Malnutrition, as evidenced by: 
             Nutritional intake of <75% of recommended intake for >1 month Weight loss of  >5% in 1 month, >7.5% in 3 months, >10% in 6 months, >20% in 1 year Nutrition Diagnoses:  
Unintentional weight loss related to homeless; financial constraints as evidenced by 19% weight loss over past 1.5 mth; 9% over past 6 mth. Plan: 1. Full liquids 2. Ensure TID 3. Monitor weight, labs and PO intake 4. RD to follow\" Please clarify if you concur with RD assessment: 
=>Severe Malnutrition =>Other Explanation of clinical findings =>Unable to Determine (no explanation of clinical findings) Thank you, 
Elizabeth Steele RN BSN CCDS [phone]

## 2019-01-09 NOTE — PROGRESS NOTES
Nutrition follow up/ 
Plan of care RECOMMENDATIONS:  
 
1. Regular diet 2. Ensure TID 3. Monitor weight, labs and PO intake 4. RD to follow GOALS:  
 
1. Met/Ongoing: PO intake meets >75% of protein/calorie needs by 1/14 2. Met/Ongoing: Weight Maintenance/Gradual weight gain (1-2 lb by 1/14) ASSESSMENT: 
 
Weight status is classified as normal per BMI of 19.8. However, patient is 84% IBW and with 19%unintentional weight loss over past 1.5 mth and 9% weight loss over past 6 months per documented weights. PO intake appears adequate. Continue Ensure TID for additional calories/protein. Labs noted. Patient with hypoalbuminemia and hypocalcemia. H/H (8.9/27.1). Nutrition recommendations listed. RD to follow. Nutrition Diagnoses:  
Unintentional weight loss related to homeless; financial constraints as evidenced by 19% weight loss over past 1.5 mth; 9% over past 6 mth. Meets Criteria for Chronic Malnutrition  
[x] Severe Malnutrition, as evidenced by: 
 [] Severe muscle wasting, loss of subcutaneous fat 
 [x] Nutritional intake of <75% of recommended intake for >1 month 
 [x] Weight loss of  >5% in 1 month, >7.5% in 3 months, >10% in 6 months, >20% in 1 year 
 [] Severe edema Nutrition Risk:  [] High  [x] Moderate []  Low SUBJECTIVE/OBJECTIVE:  
  
(1/4): Transferred from ICU. Patient with fatigue and nausea x 3 days PTA. S/P EGD with biopsy on 1/3. Patient with worsening left foot infection. S/P TMA last March. Plan for left foot first met debridement with bone biopsy on 1/5. Patient reports having weight loss due to being homeless and not able to afford much. Stated weight of 190 lb last March and has recently been around 160 lb. Doesn't care for full liquids and is wanting solid food. Explained diet advancement process. Agreeable to trying Ensure supplements with meals. Denies food allergies and problems with chewing/swallowing. Encouraged adequate intake. Will monitor diet advancement. (1/9): S/P LEFT FOOT ADDITION RESECTION OF THE METATARSAL BONES FIRST, SECOND, THIRD, FORTH AND FIFTH AT THE TMA SITE, BONE BIOPSY OF 1ST METATARSAL, CONVERSION OF THE TMA SITE WITH FLAP CLOSURE on 1/7. Patient reports having a good appetite and eating most of meals. Observed 50% intake of lunch meal today but stated eating all of breakfast this morning. % intake of meals per vitals. Denies GI complaints. Will monitor. Information Obtained from:  
 [x] Chart Review [x] Patient 
 [] Family/Caregiver 
 [] Nurse/Physician 
 [] Interdisciplinary Meeting/Rounds Diet: Regular diet; Ensure TID Medications: [x] Reviewed (IVF: Lactated ringers infusion at 50 ml/hr; Lipitor 40 mg) Allergies: [x] Reviewed Encounter Diagnoses ICD-10-CM ICD-9-CM 1. Atrial fibrillation with RVR ([facility]) I48.91 427.31  
2. Acute upper GI bleed K92.2 578.9 3. Hypokalemia E87.6 276.8 4. Hypocalcemia E83.51 275.41 Past Medical History:  
Diagnosis Date  Calculus of kidney  Depression  Hypertension  Sleep disorder  Vascular disorder of lower extremity   
 bilateral  
  
Labs:   
Lab Results Component Value Date/Time Sodium 140 01/08/2019 05:30 AM  
 Potassium 4.3 01/08/2019 05:30 AM  
 Chloride 104 01/08/2019 05:30 AM  
 CO2 29 01/08/2019 05:30 AM  
 Anion gap 7 01/08/2019 05:30 AM  
 Glucose 98 01/08/2019 05:30 AM  
 BUN 12 01/08/2019 05:30 AM  
 Creatinine 0.97 01/08/2019 05:30 AM  
 Calcium 8.4 (L) 01/08/2019 05:30 AM  
 Magnesium 2.3 01/03/2019 04:15 AM  
 Albumin 2.8 (L) 01/03/2019 04:15 AM  
 
Anthropometrics: BMI (calculated): 19.8 Last 3 Recorded Weights in this Encounter 01/02/19 0930 01/03/19 0948 01/03/19 0843 Weight: 59 kg (130 lb) 59 kg (130 lb) 59 kg (130 lb) Ht Readings from Last 1 Encounters:  
01/03/19 6' 2\" (1.88 m) Weight Metrics 1/3/2019 6/30/2018 6/8/2018 5/18/2018 5/14/2018 5/11/2018 Weight 130 lb 143 lb 146 lb 3.2 oz 142 lb 9.6 oz 145 lb 150 lb BMI 16.69 kg/m2 18.36 kg/m2 18.77 kg/m2 18.31 kg/m2 18.62 kg/m2 19.79 kg/m2  
 160 lb (11/17/18)- per Care Everywhere Patient Vitals for the past 100 hrs: 
 % Diet Eaten 01/09/19 1045 80 % 01/08/19 1844 100 % 01/08/19 1224 100 % 01/08/19 0830 100 % 01/07/19 1835 100 % 01/07/19 1013 0 % 01/06/19 1826 80 % 01/06/19 1445 100 % 01/05/19 1443 100 % 01/05/19 1003 100 % IBW: 154 lb %IBW: 84% UBW: 160lb [x] Weight Loss (19% x 1.5 mth and 9% x 6 mth) - weight stable from admission Estimated Nutrition Needs: [x] MSJ Calories: 0610-6204 Kcal Based on:   [x] Actual BW   
Protein:   70-85 g Based on:   [x] Actual BW Fluid:       4598-3052 ml Based on:   [x] Actual BW  
 
 [x] No Cultural, Scientology or ethnic dietary need identified. [] Cultural, Scientology and ethnic food preferences identified and addressed Wt Status:  [x] Normal (18.6 - 24.9) [] Underweight (<18.5) [] Overweight (25 - 29.9) [] Mild Obesity (30 - 34.9)  [] Moderate Obesity (35 - 39.9) [] Morbid Obesity (40+) Nutrition Problems Identified:  
[] Suboptimal PO intake (improved) [] Food Allergies [] Difficulty chewing/swallowing/poor dentition 
[] Constipation/Diarrhea  
[] Nausea/Vomiting  
[] None 
[x] Other: Wound healing Plan:  
[] Therapeutic Diet 
[]  Obtained/adjusted food preferences/tolerances and/or snacks options [x]  Continue dietary supplements as prescribed 
[] Occupational therapy following for feeding techniques []  HS snack added  
[]  Modify diet texture  
[]  Modify diet for food allergies []  Assist with menu selection  
[x]  Monitor PO intake on meal rounds  
[x]  Continue inpatient monitoring and intervention  
[]  Participated in discharge planning/Interdisciplinary rounds/Team meetings  
[]  Other:  
 
Education Needs: 
 [] Not appropriate for teaching at this time due to: 
 [x] Identified and addressed Nutrition Monitoring and Evaluation: [x] Continue ongoing monitoring and intervention 
[] Other [address]

## 2019-01-09 NOTE — PROGRESS NOTES
Problem: Falls - Risk of 
Goal: *Absence of Falls Document Cindy Latin Fall Risk and appropriate interventions in the flowsheet. Outcome: Progressing Towards Goal 
Fall Risk Interventions: 
Mobility Interventions: Patient to call before getting OOB Medication Interventions: Patient to call before getting OOB, Teach patient to arise slowly, Evaluate medications/consider consulting pharmacy Elimination Interventions: Call light in reach, Patient to call for help with toileting needs, Urinal in reach History of Falls Interventions: Door open when patient unattended, Room close to nurse's station Problem: Pressure Injury - Risk of 
Goal: *Prevention of pressure injury Document Hal Scale and appropriate interventions in the flowsheet. Outcome: Progressing Towards Goal 
Pressure Injury Interventions: 
Sensory Interventions: Assess changes in LOC, Keep linens dry and wrinkle-free, Pressure redistribution bed/mattress (bed type) Activity Interventions: Pressure redistribution bed/mattress(bed type) Mobility Interventions: HOB 30 degrees or less, Pressure redistribution bed/mattress (bed type) Nutrition Interventions: Document food/fluid/supplement intake

## 2019-01-09 NOTE — PROGRESS NOTES
1940  Bedside and Verbal shift change report given to [address] [place] [address] (oncoming nurse) by Wiley HARRISON (offgoing nurse). Report included the following information SBAR, Kardex, Intake/Output, MAR, Recent Results and Cardiac Rhythm NSR.  
 
0030  Pt out of bed ambulated to bathroom after spilling urinal in bed. Pulled drain from left foot incision site. Dressing saturated with blood. Bloody kerlix removed, and dressing reinforced new ace bandage placed. 0045 right arm picc mac 29 dressing changed as per protocol. All ports flushed with 10 ml ns and end valves changed good blood return from all ports. All ports end caps changed. 0059  Pt with complaint of pain, medicated as ordered with roxicodone. 0720 Bedside and Verbal shift change report given to Kev Gomez (oncoming nurse) by [address] [place] [address] (offgoing nurse). Report included the following information SBAR, Kardex, Intake/Output, MAR, Recent Results and Cardiac Rhythm NSR.

## 2019-01-09 NOTE — PROGRESS NOTES
Chart reviewed. Noted podiatry note indicating pt walking on foot & going outside to smoke. Noted therapy note, pt indicating that he is homeless & wants to go to shelter, referral sent to . Will cont to follow for needs. Magali Monet RN,ext [phone].

## 2019-01-09 NOTE — ROUTINE PROCESS
26- Assumed care. Pt in bed awake. Alert and oriented x 4. No c/o pain. No respiratory distress noted. Call light in reach. 0498- Due meds given. Tolerated well. Prn pain meds given. 1340- Prn pain meds given. Bedside and Verbal shift change report given to [address] [place] [address] (oncoming nurse) by Ninfa Thompson RN 
 (offgoing nurse). Report included the following information SBAR, Kardex, Procedure Summary, Intake/Output, MAR, Recent Results and Med Rec Status.

## 2019-01-09 NOTE — ADT AUTH CERT NOTES
Emergency Contact(s) Name Relation Home Work Mobile Christine Tolliver [phone] Carlos Cage Sister [phone] Utilization Reviews [facility] Adult-Extended Stay (1/8/2019) by Izabel Moura RN Review Status Review Entered In Primary 1/8/2019 14:06 Criteria Review REVIEW SUMMARY 
  
Patient: Rosalinda Sifuentes Review Number: [id number] Review Status: In Primary 
  
Condition Specific: Yes 
  
Condition Level Of Care Code: ACUTE Condition Level Of Care Description: Acute 
  
  
OUTCOMES Outcome Type: Primary 
  
  
  
REVIEW DETAILS 
  
Service Date: 01/08/2019 Admit Date: 01/02/2019 Product: [facility] Adult Subset: Extended Stay (Symptom or finding within 24h) 
  (Excludes PO medications unless noted) [X] Select Level of Care, One: 
            [X] ACUTE, >= One: 
                [X] Cardiovascular or peripheral vascular, One: 
                    [X] Partial responder, not clinically stable for discharge and requires continued stay, >= One: 
                        [X] PO medication adjustment <= 2d, >= One: 
                            [X] Calcium channel or beta blocker ~--Admin, IQ Admin Admin on 01- 02:06 PM--~ 
                            per podiatry  
                            discussed in great details the bloody marks on the floor by bedside indicated that he was putting pressue on the surgical foot. told him again just like we discussed perop he must NOT put any wt on the foot by doing such he puts himself at higher risk of not healing and going onto a bka  
                            again he indicated that he understood just like he did preop and would stay NON WT bearing on the left surgical foot  
                            cont PT for non wt bearing training with crutches and or walker transfer to toilet and bedside chair DSD change with beatdine sterile gauze and kerix  
                            cont LWC and iv abx  
                            iv zosyn 3.375 gm q8h 
                             
                             
                            ~--Admin, IQ Admin Admin on 01- 02:03 PM--~ 
                            tele  
                            po percocet 7.5 1-2 tab q4h prn severe pain given 2 tabs x3  
                            changed to oxycodone ir 10-15 mg po q4h prn severe pain  
                            lr @ 50  
                            hgb 8.9  
                            reg diet  
                            ensure all meals PT consult  
                            consult for walker and crutches  
                            dressing change daily  
                            nwb left foot  
                            cont po protonix  
                            cont toprol xl for rate control can restart plavix on dc  
                            f/o cardio to discuss longterm AC  
                            ivab w transition to oral for total 4-5 days post clean margins  
                            cont zosyn for gnr  
                            pt non weight bearing  
                            cont acceptable home meds for chronic conditions  
                            no major events overnite c/p foot pain not toelrable with meds  
                            apparently put weight on surgical foot  
                             
                             
                            per PT pt declined to participate in PT today reqiesting PT to come back tomorrow ~--Admin, [employer] Admin Admin on 01- 01:59 PM--~ 
                            tele 98.8 84 145/68 20 100% ra 59 kg  
                            po toprol xl 25 mg daily 
                             
                             
                             
  
Version: InterQual® 2018.1 [facility]  © 2018 [employer] 6199 and/or one of its [place]. All Rights Reserved. CPT only © 2017 American Medical Association. All Rights Reserved. Nico Ruelas (1/7/2019) by Kati Boland RN Review Status Review Entered In Primary 1/7/2019 19:05 Criteria Review REVIEW SUMMARY 
  
Patient: Gloria Samaniego Review Number: [id number] Review Status: In Primary 
  
Condition Specific: Yes 
  
Condition Level Of Care Code: ACUTE Condition Level Of Care Description: Acute 
  
  
OUTCOMES Outcome Type: Primary 
  
  
  
REVIEW DETAILS 
  
Service Date: 01/07/2019 Admit Date: 01/02/2019 Product: [employer] Adult Subset: Anemia/Bleeding 
    (Symptom or finding within 24h) 
  (Excludes PO medications unless noted) Select Day, One: 
            [ ] Episode Day 2-5, One: 
                [ ] ACUTE, One: 
                    [ ] Partial responder, not clinically stable for discharge and requires continued stay, >= One: 
                    ~--Admin, IQ Admin Admin on 01- 07:05 PM--~ Cont PO pronix 
                    - Cont toprol XL for rate control 
                    - Can restart plavix on discharge - F/u cardiology to discuss longterm [facility] 
                    - s/p TMA resection per podiatry - Pain control PRN 
                    - PT eval and treat per podiatry recs 
                    - Cont acceptable home medications for chronic conditions  
                    - DVT protocol Date of Procedure: 1/7/2019 Preoperative Diagnosis: Left foot necrotic  non healing wound with dehiscence of TMA surgical site. R/O osteomyelitis. Postoperative Diagnosis: Left foot necrotic  non healing wound with dehiscence of TMA surgical site. R/O osteomyelitis. Procedure(s): LEFT FOOT ADDITION RESECTION OF THE METATARSAL BONES FIRST, SECOND, THIRD, FORTH AND FIFTH AT THE TMA SITE, BONE BIOPSY OF 1ST METATARSAL, CONVERSION OF THE TMA SITE WITH FLAP CLOSURE Surgeon(s) and Role: Michelle Hutson DPM - Primary VS: 97.9, 67, 16, 133/76, 98% Meds:LR @ 50cc/hr IV, norco 5/325mg 1 tab po q6hrs, percocet7.5/325mg 1 tab po q6hrs Labs: none 
                     
                     
                     
  
Version: InterQual® 2018.1 [employer]  © 2018 [employer] 6199 and/or one of its [place]. All Rights Reserved. CPT only © 2017 American Medical Association. All Rights Reserved.

## 2019-01-09 NOTE — PROGRESS NOTES
Podiatry Surgery Progress Note Patient: Shaw Patterson MRN: [id number] LOS: 7 YOB: 1960  Age: 63 y.o. Sex: male Admit Date: 1/2/2019 POD 2 Days Post-Op Procedure:   Procedure(s): LEFT FOOT ADDITION RESECTION OF THE METATARSAL BONES FIRST, SECOND, THIRD, FORTH AND FIFTH AT THE TMA SITE, BONE BIOPSY OF 1ST METATARSAL, CONVERSION OF THE TMA SITE WITH FLAP CLOSURE Subjective:  
  
Shaw Patterson is a 64 y/o male seen at bedside today  state over all feeling / doing fine - again he admits to walking and leaving the floor going outside and smoking. He also pulled out the drain. Patient is  currently resting quiet and no apparent distress. 
  
Review of Systems:   
  
Positives in [place]** General: denies chronic fatigue, weight loss, fever, anemia, bruising, depression, nervousness, panic attacks HEENT: denies ringing in ears,  dizzy spells, poor vision, glaucoma, sinus trouble, hoarseness GI:  denies bloating, heartburn, regurgitation, difficulty swallowing, painful swallowing, nausea, abdominal pain, decreased appetite Lungs: denies pneumonia, asthma, cough, SOB, hemoptysis Heart: denies chest pain, irregular heart beat Skin: denies rashes, hives, allergic reaction (left foot noted as above) Urinary: denies UTI, kidney stones,  blood in urine, Bones and Joints: denies arthritis, rheumatism, back pain, gout, osteoporosis Neurologic: denies headaches, numbness, tingling Endocrine: No Abnormal Findings Allergic/Immunologic:  No Abnormal Findings. Objective:  
 
Vitals Patient Vitals for the past 8 hrs: 
 BP Temp Pulse Resp SpO2  
01/09/19 0412 140/75 98.5 °F (36.9 °C) 80 20 92 % 01/09/19 0046 133/77 98.4 °F (36.9 °C) 71 20 90 % I/O Current Shift No intake/output data recorded. I/O Last Three Shifts 01/07 1901 - 01/09 0700 In: 8323 [P.O.:1360; I.V.:2000] Out: 2900 [Urine:2900] Data Review No results found for this or any previous visit (from the past 24 hour(s)). Physical Exam: 
Ramón Pitch a 63 y. o. male who is pleasant, alert and oriented x3, in no apparent distress. Patient is well-developed and nourished, with good attention to hygiene and body habitus. Mood and affect normal, appropriate to situation. 
  
Left foot; DSD intact with moderate red bloody drainage. TLS drain pulled out. Sutures intact without gap but with noted blood clot along the medial incision. There is diffuse edema without an increase in temp to the tonja wound area. No ascending lymphangitis. No active bloody drainage noted NO odor.  
  
  
Vascular Exam:  
Dorsalis Pedis 1/4 and Posterior Tibial 2/4  Bilaterally. Skin temp warm to warm. Pedal hair is decreased. There are not varicosities present.  
  
PVL study: KETURAH - resting KETURAH is normal 
No evidence of significant arterial disease in the lower extremity bilaterally at rest, with the exception of isolated posterior tibial disease bilaterally. 
  
Neurological Exam:  
Light touch protective sensation is absent to both feet. There is noted Loss of protective sensation 
  
 Musculoskeletal Exam:  
Muscle tone is normal for age. The muscle strength is 5/5 for the flexors, extensors, inverters, and everter's. 
  
Dermatological Exam:  
Skin is of abnormal texture and turgor with some atrophic skin changes noting decreased hair growth, nail changes (thickening), pigmentary changes, skin texture (thin,shiney), skin color (rubor, red) . 
  
Radiographic: PRE OP (see report for details) Left foot - left forefoot with subtle osseous erosions 
involving the distal margin of the first metatarsal head compatible with 
osteomyelitis Wound Presentation: Wound Toe (specify in comments) Anterior; Left (Active) DRESSING STATUS Clean, dry, and intact 1/7/2019 10:21 AM  
DRESSING TYPE Elastic bandage 1/7/2019 10:21 AM  
 Non-Pressure Injury Full thickness (subcut/muscle) 1/6/2019  7:00 AM  
Wound Length (cm) 6.9 cm 1/4/2019 10:15 AM  
Wound Width (cm) 5 cm 1/4/2019 10:15 AM  
Wound Depth (cm) 1 1/4/2019 10:15 AM  
Wound Surface area (cm^2) 34.5 cm^2 1/4/2019 10:15 AM  
Condition of Base Eschar;Slough 1/7/2019 10:21 AM  
Condition of Edges Open 1/6/2019  7:00 AM  
Tissue Type Black; Yellow 1/7/2019 10:21 AM  
Tissue Type Percent Black 50 % 1/4/2019 10:15 AM  
Tissue Type Percent Yellow 50 1/4/2019 10:15 AM  
Direction of Undermining 1    oclock;2    oclock;3    oclock;4    oclock;5    oclock;6    oclock;7    oclock;8    oclock;9    oclock;10    oclock;11    oclock 1/4/2019 10:15 AM  
Depth of Undermining (cm) 2 1/4/2019 10:15 AM  
Drainage Amount  Small  1/4/2019 10:15 AM  
Drainage Color Serous 1/4/2019 10:15 AM  
Wound Odor Foul 1/7/2019 10:21 AM  
Periwound Skin Condition Intact 1/7/2019 10:21 AM  
Cleansing and Cleansing Agents  Dermal wound cleanser;Betadine 1/6/2019  7:00 AM  
Dressing Type Applied Other (Comment);4 x 4;Gauze wrap (ting); Elastic bandage 1/6/2019  7:00 AM  
Procedure Tolerated Well 1/6/2019  7:00 AM  
Number of days: 6 Wound Foot Left (Active) DRESSING STATUS Breakthrough drainage;Removed 1/9/2019  1:17 AM  
DRESSING TYPE 4 x 4;Gauze wrap (ting); Elastic bandage 1/9/2019  1:17 AM  
Number of days: 2 Assessment:  
 
Principal Problem: 
  GI bleed (1/2/2019) Active Problems: 
  A-fib ([address] (1/2/2019) Hypocalcemia (1/2/2019) Hypokalemia (1/2/2019) PAD (peripheral artery disease) ([address] (1/2/2019) Acute blood loss anemia (1/2/2019) Osteomyelitis of left foot ([facility] (1/4/2019) Overview: 1st metatarsal head Plan/Recommendations/Medical Decision Making:  
 
 
Reviewed once again all the above findings along with all the above today's clinical findings.    
  
Discussed once again by going outside smoking and putting wt on the foot continue to put his foot at risk. Again we discussed he must NOT put any wt on the foot - by doing such he puts himself a higher risk of not healing and going onto a BKA. Again he indicated that he understood and would stay NON WT bearing of the left surgical foot.  
  
Continue to have PT work with him to be NON wt bearing training with crutches and / or walker - transfer to toilet and bedside chair.  
  
DSD change with betadine sterile gauze and Kerlix.   
  
Continue LWC and IVabx 
  
Intra op path along with tissue and bone C&S pending. 
  
 
Dr Rajendra Haddad Podiatric Surgeon [facility]) [phone] 
1/9/2019 
8:32 AM

## 2019-01-09 NOTE — PROGRESS NOTES
Problem: Mobility Impaired (Adult and Pediatric) Goal: *Acute Goals and Plan of Care (Insert Text) Physical Therapy Goals Initiated 1/9/2019 and to be accomplished within 7 days. 1.  Patient will complete all bed mobility with independence in order to prepare for EOB/OOB activity. 2.  Patient will perform sit <> stand with modified independence in order to prepare for OOB/gait activity. 3.  Patient will perform bed to chair transfers with modified independence in order to promote mobility and encourage seated activity to progress towards their prior level of function. 4.  Patient will ambulate 150 feet with modified independence using LRAD in order to prepare for safe negotiation of their environment. 5.  Patient will ascend/descend 5 steps with B or S handrail use with modified independence in order to prepare for safe exit/entry into their home environment. Outcome: Progressing Towards Goal 
PHYSICAL THERAPY: Initial Assessment INPATIENT: Medicaid: Hospital Day: 8 Patient: Jack Mojica (46 y.o. male)    Date: 1/9/2019 Primary Diagnosis: GI bleed A-fib ([address] Procedure(s) (LRB): LEFT FOOT ADDITION RESECTION OF THE METATARSAL BONES FIRST, SECOND, THIRD, FORTH AND FIFTH AT THE TMA SITE, BONE BIOPSY OF 1ST METATARSAL, CONVERSION OF THE TMA SITE WITH FLAP CLOSURE (Left), 2 Days Post-Op Precautions: NWB(L) 
 
 
PLOF: Independent ASSESSMENT : 
Patient supine in bed, agreeable to participation with PT. Reports that he was living with a friend PTA but is essentially homeless. Planning to d/c to a shelter; has family in different states but none locally. Discussed NWB status with patient; patient verbalizes understanding. MOD I for supine > sit with good seated balance. Supervision for sit <> stand with RW and with cuing for strategy. Patient demo's good standing balance with RW.  Ambulation x 90 ft with RW using hopping strategy at supervision level. Patient demo's good mobility with RW but requires verbal cuing for speed and safety. Patient returned to bed and left supine with R LE elevated. Discussed importance of elevation and adherence to precautions as patient periodically need cuing throughout session to keep his L LE off the ground. Adhered to weight bearing restriction 80-90% of the time. Allso discussed use of crutches vs RW; patient reports that he is more comfortable with RW. C/o of L foot and neck pain. RW provided to room for patient's safe mobilization with nursing staff. Plan to see for 1 - 2 more visits to maximize safety with mobility and stair training. Patient will need indigent RW. Recommend d/c likely without need for skilled PT pending progression with PT. May be appropriate for outpatient PT pending d/c disposition. Patient presents with deficits in: 
Gait and Stairs Patient will benefit from skilled intervention to address the above impairments. Patients rehabilitation potential is considered to be Excellent Factors which may influence rehabilitation potential include:  
[]         None noted 
[]         Mental ability/status []         Medical condition 
[]         Home/family situation and support systems 
[x]         Safety awareness 
[]         Pain tolerance/management 
[]         Other: PLAN : 
Recommendations and Planned Interventions: 
[x]           Bed Mobility Training             [x]    Neuromuscular Re-Education 
[x]           Transfer Training                   []    Orthotic/Prosthetic Training 
[x]           Gait Training                          []    Modalities [x]           Therapeutic Exercises          []    Edema Management/Control 
[x]           Therapeutic Activities            [x]    Patient and Family Training/Education 
[]           Other (comment): EDUCATION:  
Education:  Patient was educated on the following topics: Purpose of PT, PT POC, precautions, safety, gait with RW, transfers. Verbalizes understanding. Barriers to Learning/Limitations: None Compensate with: visual, verbal, tactile, kinesthetic cues/model Recommendations for the next treatment session: Gait, stairs, patient requesting to cervical and shoulder stretching exercises. Frequency/Duration: Patient will be followed by physical therapy 3 - 5 times a week to address goals. Discharge Recommendations: Outpatient vs. None Further Equipment Recommendations for Discharge: rolling walker Factors which may impact discharge planning: N/A  
 
SUBJECTIVE:  
Patient stated I know I can't put weight down on this leg.  OBJECTIVE DATA SUMMARY:  
 
Past Medical History:  
Diagnosis Date  Calculus of kidney  Depression  Hypertension  Sleep disorder  Vascular disorder of lower extremity   
 bilateral  
 
Past Surgical History:  
Procedure Laterality Date  HX AMPUTATION TOE Left   
 x 5 toes  HX HEENT  VASCULAR SURGERY PROCEDURE UNLIST Eval Complexity: History: MEDIUM  Complexity : 1-2 comorbidities / personal factors will impact the outcome/ POC Exam:MEDIUM Complexity : 3 Standardized tests and measures addressing body structure, function, activity limitation and / or participation in recreation  Presentation: MEDIUM Complexity : Evolving with changing characteristics  Clinical Decision Making:Medium Complexity MOD I - Supervision for mobility, NWB Overall Complexity:MEDIUM 
 
G CODES:Mobility  Current  CI= 1-19%   Goal  CH= 0%. The severity rating is based on the Other Clinical Judgement: Supervision - MOD I for mobility, NWB on L, good balance Prior Level of Function/Home Situation:  
Home Situation Home Environment: Other (comment)(Homeless) One/Two Story Residence: One story Living Alone: Yes Support Systems: Friends \ neighbors Patient Expects to be Discharged to[de-identified] Shelter Current DME Used/Available at Home: NoneCritical Behavior: 
Neurologic State: Alert Orientation Level: Oriented X4 Cognition: Follows commands Psychosocial 
Patient Behaviors: Calm; Cooperative Needs Expressed: Educational 
Purposeful Interaction: Yes Tone : NormalSensation: NT 
Range Of Motion: [facility] Functional Mobility: 
 
 
Functional Status Indep (I) Mod I Super-vision Min A Mod A Max A Total A Assist x2 Verbal cues Additional time Not tested Comments Rolling []  []  [] []    []    []  []  [] [] [] [x] Supine to sit []  [x]  [] []  []  []  []  [] [] [] [] Sit to supine []  [x]  [] []  []  []  []  [] [] [] [] Sit to stand []  []  [x] []  []  []  []  [] [] [] []   
Stand to sit []  []  [x] []  []  []  []  [] [] [] [] Bed to chair transfers []  []  [] []  []  []  []  [] [] [] [x] Balance Good Robe Vanessa Poor Unable Not tested Comments Sitting static [x]  []  []  []  [] Sitting dynamic [x]  []  []  []  []   
Standing static [x]  []  []  []  []   
Standing dynamic [x]  []  []  []  [] Mobility/Gait:  
Level of Assistance: Supervision Assistive Device: rolling walker Distance Ambulated: 90 feet Left Lower Extremity: NWB Right Lower Extremity: FWB Base of Support: center of gravity altered Speed/[place]: [facility] Step Length: right shortened Swing Pattern: left asymmetrical 
Stance: left decreased Gait Abnormalities: altered arm swing Pain: 
L foot pain and chronic cervical pain. Vital Signs Temp: 98.2 °F (36.8 °C) Pulse (Heart Rate): 75 BP: 131/70 Resp Rate: 20    
O2 Sat (%): 97 % Activity Tolerance:  
Good Please refer to the flowsheet for vital signs taken during this treatment. After treatment:  
[]         Patient left in no apparent distress sitting up in chair 
[x]         Patient left in no apparent distress in bed 
[x]         Call bell left within reach 
[]         Nursing notified []         Caregiver present 
[]         Bed alarm activated COMMUNICATION/EDUCATION:  
[x]         Fall prevention education was provided and the patient/caregiver indicated understanding. [x]         Patient/family have participated as able in goal setting and plan of care. [x]         Patient/family agree to work toward stated goals and plan of care. []         Patient understands intent and goals of therapy, but is neutral about his/her participation. []         Patient is unable to participate in goal setting and plan of care. Thank you for this referral. 
Daija Leung Time Calculation: 27 mins

## 2019-01-09 NOTE — ANCILLARY DISCHARGE INSTRUCTIONS
Spoke with patient, he stated that he has been to the [facility], he was staying there but he does not want to go back because his foot may get infected. He stated that 300 men are using one shower and he has to keep his foot clean. Patient does not want to go to [facility] because he has to leave out in the mornings. Patient stated that he would like to go to the [facility], he is on the wait list, he has been on the wait list since July. Patient gets $700 in SSI and his mother is a mulit dollar millionaire, she can not help him because she is in a nursing home in [place]. Patient states that he would like to go to a rehab facility once he leaves here, he went to [facility] the last time, he didn't like it but would go back if that is the only option.

## 2019-01-09 NOTE — CDMP QUERY
The medical record reflects the following: 
 
Risk: pt homeless, dm with amputation of Left Foot Clinical Indicators:  bmi 16.69 , acidosis, hypokalemia,  
Treatment:  dietary, gi consult, monitor I/O Please clarify if this patient is being treated/managed for: 
 
=>Underweight with Bmi <19  
=>Other Explanation of clinical findings =>Unable to Determine (no explanation of clinical findings) Please clarify and document your clinical opinion in the progress notes and discharge summary including the definitive and/or presumptive diagnosis, (suspected or probable), related to the above clinical findings. Please include clinical findings supporting your diagnosis. If you DECLINE this query or would like to communicate with [facility], please utilize the \"[employer] message box\" at the TOP of the Progress Note on the right.    
 
Thank you, 
Opal Hawkins RN/CCDS

## 2019-01-09 NOTE — PROGRESS NOTES
Internal Medicine Progress Note Patient's Name: Marvie Soulier Admit Date: 1/2/2019 Length of Stay: 7 Assessment/Plan Active Hospital Problems Diagnosis Date Noted  Osteomyelitis of left foot ([facility] 01/04/2019 1st metatarsal head  A-fib ([facility] 01/02/2019  GI bleed 01/02/2019  Hypocalcemia 01/02/2019  Hypokalemia 01/02/2019  PAD (peripheral artery disease) ([facility] 01/02/2019  Acute blood loss anemia 01/02/2019 Underweight with Bmi <19 Moderate malnutrition 
 
- Cont PO pronix 
- Cont toprol XL for rate control 
- Can restart plavix on discharge - F/u cardiology to discuss longterm AC 
- IVAB w/ transition to oral for total 4-5 days post clean margins - s/p TMA resection per podiatry - Cont zosyn, culture w/ GNR, strep, staph species - PT w/ non wt bearing training - F/u podiatry - Pain control PRN 
- Wound care - Cont acceptable home medications for chronic conditions  
- DVT protocol I have personally reviewed all pertinent labs and films that have officially resulted over the last 24 hours. I have personally checked for all pending labs that are awaiting final results. Subjective Pt s/e @ bedside No major events overnight Patient left floor to smoke weed yesterday and cig w/ friend Apparently put pressure on healing foot again States he will not do again Denies CP or SOB Objective Visit Vitals /61 (BP 1 Location: Left arm, BP Patient Position: Head of bed elevated (Comment degrees)) Pulse 66 Temp 98.1 °F (36.7 °C) Resp 20 Ht 6' 2\" (1.88 m) Wt 59 kg (130 lb) SpO2 99% BMI 16.69 kg/m² Physical Exam: 
General Appearance: NAD, conversant Lungs: CTA with normal respiratory effort CV: RRR, no m/r/g Abdomen: soft, non-tender, normal bowel sounds Extremities: no cyanosis, LLE at level of foot/ankle in dressing w/ saturation of blood Neuro: No focal deficits, motor/sensory intact Lab/Data Reviewed: 
BMP:  
 No results found for: NA, K, CL, CO2, AGAP, GLU, BUN, CREA, GFRAA, GFRNA 
CBC:  
No results found for: WBC, HGB, HGBEXT, HCT, HCTEXT, PLT, PLTEXT, HGBEXT, HCTEXT, PLTEXT Imaging Reviewed: 
No results found. Medications Reviewed: 
Current Facility-Administered Medications Medication Dose Route Frequency  oxyCODONE IR (ROXICODONE) tablet 10-15 mg  10-15 mg Oral Q4H PRN  piperacillin-tazobactam (ZOSYN) 3.375 g in 0.9% sodium chloride (MBP/ADV) 100 mL MBP  #EXTENDED 4-HOUR INFUSION##  3.375 g IntraVENous Q8H  
 senna (SENOKOT) tablet 17.2 mg  2 Tab Oral QHS  nicotine (NICODERM CQ) 7 mg/24 hr patch 1 Patch  1 Patch TransDERmal DAILY  metoprolol succinate (TOPROL-XL) XL tablet 25 mg  25 mg Oral DAILY  lactated Ringers infusion  50 mL/hr IntraVENous CONTINUOUS  
 pantoprazole (PROTONIX) tablet 40 mg  40 mg Oral ACB&D  
 0.9% sodium chloride infusion 250 mL  250 mL IntraVENous PRN  
 gabapentin (NEURONTIN) capsule 1,200 mg  1,200 mg Oral TID  venlafaxine-SR (EFFEXOR-XR) capsule 150 mg  150 mg Oral DAILY WITH BREAKFAST  sodium chloride (NS) flush 5-10 mL  5-10 mL IntraVENous Q8H  
 sodium chloride (NS) flush 5-10 mL  5-10 mL IntraVENous PRN  
 sodium chloride (NS) flush 10-30 mL  10-30 mL InterCATHeter PRN  
 sodium chloride (NS) flush 10 mL  10 mL InterCATHeter Q24H  
 sodium chloride (NS) flush 10 mL  10 mL InterCATHeter PRN  
 sodium chloride (NS) flush 10-40 mL  10-40 mL InterCATHeter Q8H  
 bacitracin 500 unit/gram packet 1 Packet  1 Packet Topical PRN  
 atorvastatin (LIPITOR) tablet 40 mg  40 mg Oral QHS  aspirin tablet 325 mg  325 mg Oral DAILY Dante Saldivar DO Internal Medicine, Hospitalist 
Pager: [phone] [facility]

## 2019-01-10 PROCEDURE — 74011000258 HC RX REV CODE- 258: Performed by: INTERNAL MEDICINE

## 2019-01-10 PROCEDURE — 74011250637 HC RX REV CODE- 250/637: Performed by: HOSPITALIST

## 2019-01-10 PROCEDURE — 74011250636 HC RX REV CODE- 250/636: Performed by: INTERNAL MEDICINE

## 2019-01-10 PROCEDURE — 74011250637 HC RX REV CODE- 250/637: Performed by: INTERNAL MEDICINE

## 2019-01-10 PROCEDURE — 74011000258 HC RX REV CODE- 258: Performed by: HOSPITALIST

## 2019-01-10 PROCEDURE — 74011250637 HC RX REV CODE- 250/637: Performed by: NURSE PRACTITIONER

## 2019-01-10 PROCEDURE — 65660000000 HC RM CCU STEPDOWN

## 2019-01-10 PROCEDURE — 74011250636 HC RX REV CODE- 250/636: Performed by: HOSPITALIST

## 2019-01-10 RX ORDER — IBUPROFEN 200 MG
1 TABLET ORAL DAILY
Status: DISCONTINUED | OUTPATIENT
Start: 2019-01-10 | End: 2019-01-11 | Stop reason: HOSPADM

## 2019-01-10 RX ORDER — VANCOMYCIN/0.9 % SOD CHLORIDE 1.5G/250ML
1500 PLASTIC BAG, INJECTION (ML) INTRAVENOUS EVERY 12 HOURS
Status: DISCONTINUED | OUTPATIENT
Start: 2019-01-10 | End: 2019-01-11 | Stop reason: HOSPADM

## 2019-01-10 RX ORDER — VANCOMYCIN/0.9 % SOD CHLORIDE 1.5G/250ML
1500 PLASTIC BAG, INJECTION (ML) INTRAVENOUS ONCE
Status: COMPLETED | OUTPATIENT
Start: 2019-01-10 | End: 2019-01-10

## 2019-01-10 RX ADMIN — Medication 10 ML: at 09:21

## 2019-01-10 RX ADMIN — ASPIRIN 325 MG ORAL TABLET 325 MG: 325 PILL ORAL at 09:20

## 2019-01-10 RX ADMIN — OXYCODONE HYDROCHLORIDE 15 MG: 5 TABLET ORAL at 13:10

## 2019-01-10 RX ADMIN — SODIUM CHLORIDE 3 G: 900 INJECTION INTRAVENOUS at 17:00

## 2019-01-10 RX ADMIN — METOPROLOL SUCCINATE 25 MG: 25 TABLET, EXTENDED RELEASE ORAL at 09:20

## 2019-01-10 RX ADMIN — VANCOMYCIN HYDROCHLORIDE 1500 MG: 10 INJECTION, POWDER, LYOPHILIZED, FOR SOLUTION INTRAVENOUS at 09:20

## 2019-01-10 RX ADMIN — VENLAFAXINE HYDROCHLORIDE 150 MG: 150 CAPSULE, EXTENDED RELEASE ORAL at 09:19

## 2019-01-10 RX ADMIN — OXYCODONE HYDROCHLORIDE 15 MG: 5 TABLET ORAL at 09:20

## 2019-01-10 RX ADMIN — PANTOPRAZOLE SODIUM 40 MG: 40 TABLET, DELAYED RELEASE ORAL at 16:59

## 2019-01-10 RX ADMIN — Medication 10 ML: at 13:10

## 2019-01-10 RX ADMIN — GABAPENTIN 1200 MG: 400 CAPSULE ORAL at 09:19

## 2019-01-10 RX ADMIN — SODIUM CHLORIDE 3 G: 900 INJECTION INTRAVENOUS at 13:09

## 2019-01-10 RX ADMIN — Medication 10 ML: at 21:28

## 2019-01-10 RX ADMIN — PIPERACILLIN SODIUM,TAZOBACTAM SODIUM 3.38 G: 3; .375 INJECTION, POWDER, FOR SOLUTION INTRAVENOUS at 03:40

## 2019-01-10 RX ADMIN — OXYCODONE HYDROCHLORIDE 15 MG: 5 TABLET ORAL at 17:00

## 2019-01-10 RX ADMIN — GABAPENTIN 1200 MG: 400 CAPSULE ORAL at 17:00

## 2019-01-10 RX ADMIN — GABAPENTIN 1200 MG: 400 CAPSULE ORAL at 21:26

## 2019-01-10 RX ADMIN — Medication 10 ML: at 17:00

## 2019-01-10 RX ADMIN — CEFTRIAXONE 2 G: 2 INJECTION, POWDER, FOR SOLUTION INTRAMUSCULAR; INTRAVENOUS at 10:43

## 2019-01-10 RX ADMIN — ATORVASTATIN CALCIUM 40 MG: 40 TABLET, FILM COATED ORAL at 21:27

## 2019-01-10 RX ADMIN — OXYCODONE HYDROCHLORIDE 15 MG: 5 TABLET ORAL at 21:25

## 2019-01-10 RX ADMIN — PANTOPRAZOLE SODIUM 40 MG: 40 TABLET, DELAYED RELEASE ORAL at 09:20

## 2019-01-10 NOTE — PROGRESS NOTES
conducted a Follow up consultation and Spiritual Assessment for Malathi Matthews, who is a 63 y. o.,male. The  provided the following Interventions: 
Continued the relationship of care and support. Listened empathically. Offered prayer and assurance of continued prayer on patients behalf. Chart reviewed. The following outcomes were achieved: 
Patient expressed gratitude for 's visit. Assessment: 
There are no spiritual or Worship issues which require intervention at this time. Plan: 
Chaplains will continue to follow and will provide pastoral care on an as needed/requested basis.  recommends bedside caregivers page  on duty if patient shows signs of acute spiritual or emotional distress. Natalya Acosta M.Div. , [address] [address] [facility]: [phone]/TAA: [phone]

## 2019-01-10 NOTE — PROGRESS NOTES
1908  Bedside and Verbal shift change report given to [address] [place] [address] (oncoming nurse) by Lana Loyola RN (offgoing nurse). Report included the following information SBAR, Kardex, Intake/Output, MAR, Recent Results and Cardiac Rhythm NSR.  
  
0715 Bedside and Verbal shift change report given to [facility] (oncoming nurse) by [address] [place] [address] (offgoing nurse).  Report included the following information SBAR, Kardex, Intake/Output, MAR, Recent Results and Cardiac Rhythm NSR.

## 2019-01-10 NOTE — PROGRESS NOTES
Problem: Falls - Risk of 
Goal: *Absence of Falls Document [place] Silas Fall Risk and appropriate interventions in the flowsheet. Outcome: Progressing Towards Goal 
Fall Risk Interventions: 
Mobility Interventions: Patient to call before getting OOB Medication Interventions: Evaluate medications/consider consulting pharmacy, Patient to call before getting OOB Elimination Interventions: Call light in reach, Patient to call for help with toileting needs History of Falls Interventions: Door open when patient unattended, Room close to nurse's station Problem: Pressure Injury - Risk of 
Goal: *Prevention of pressure injury Document Hal Scale and appropriate interventions in the flowsheet. Outcome: Progressing Towards Goal 
Pressure Injury Interventions: 
Sensory Interventions: Keep linens dry and wrinkle-free, Pressure redistribution bed/mattress (bed type) Activity Interventions: Pressure redistribution bed/mattress(bed type) Mobility Interventions: HOB 30 degrees or less, Pressure redistribution bed/mattress (bed type) Nutrition Interventions: Document food/fluid/supplement intake

## 2019-01-10 NOTE — PROGRESS NOTES
Problem: Falls - Risk of 
Goal: *Absence of Falls Document Bishop Alejandraonds Fall Risk and appropriate interventions in the flowsheet. Outcome: Progressing Towards Goal 
Fall Risk Interventions: 
Mobility Interventions: PT Consult for assist device competence Medication Interventions: Evaluate medications/consider consulting pharmacy Elimination Interventions: Call light in reach History of Falls Interventions: Door open when patient unattended, Room close to nurse's station Problem: Pressure Injury - Risk of 
Goal: *Prevention of pressure injury Document Hal Scale and appropriate interventions in the flowsheet. Outcome: Progressing Towards Goal 
Pressure Injury Interventions: 
Sensory Interventions: Assess changes in LOC Activity Interventions: PT/OT evaluation Mobility Interventions: PT/OT evaluation Nutrition Interventions: Document food/fluid/supplement intake

## 2019-01-10 NOTE — PROGRESS NOTES
Chart reviewed. Noted that now pt wants to go to nursing facility, posted to all [facility] SNF's to see if can accept. Pt has MERISSA so will be under long term care. Will cont to follow. Magali Monet RN,ext [phone].

## 2019-01-10 NOTE — PROGRESS NOTES
Cult back with multiorganism. Will consult ID for assistance. One dose vanco for enterococcus D until further recs from ID Johan Hayward DO Internal Medicine, Hospitalist 
Pager: [phone] [facility]
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
## 2019-01-10 NOTE — PROGRESS NOTES
Pharmacy Dosing Services: Vancomycin Indication: SSTI Day of therapy: 1 Other Antimicrobials (Include dose, start day & day of therapy): Unasyn 3 gm IV every 6 hours Loading dose (date given): 1500 mg 
Current Maintenance dose: new start Goal Vancomycin Level: 15-20 
(Trough 15-20 for most infections, 20 for meningitis/osteomyelitis, pre-HD level ~25) Vancomycin Level (if drawn): none at this time Significant Cultures: pending Renal function stable? (unstable defined as SCr increase of 0.5 mg/dL or > 50% increase from baseline, whichever is greater) (Y/N): Y  
 
CAPD, Hemodialysis or Renal Replacement Therapy (Y/N): N Recent Labs 19 
0530 CREA 0.97 BUN 12 WBC 12.0 Temp (24hrs), Av.4 °F (36.9 °C), Min:98.2 °F (36.8 °C), Max:98.7 °F (37.1 °C) Creatinine Clearance (Creatinine Clearance (ml/min)): 90 ml/min Regimen assessment: New start Maintenance dose: 1500 mg IV every 12 hours Next scheduled level: trough on  at 1030 Pharmacy will follow daily and adjust medications as appropriate for renal function and/or serum levels.  
 
Thank you, 
Maciel Galeana, PHARMD

## 2019-01-10 NOTE — ROUTINE PROCESS
0700- Assumed care. Pt in bed resting. No c/o pain. No respiratory distress noted. Call light in reach. 56- Dr. Joaquin Carpenter paged for pt request of higher dosage nicotine patch. Orders received. 0920- Due meds given. Tolerated well. Prn pain meds given. Call light in reach. 36- Dr. Joaquin Carpenter made aware pt wants to go outside. He states \"the pt is not allowed to leave unit and if he wants to do so he can leave AMA\". 1315- Due meds given. PRN pain meds given. Tolerated well.  
 
1700- Prn pain meds given Bedside and Verbal shift change report given to [facility] RN (oncoming nurse) by Don Goins RN 
 (offgoing nurse). Report included the following information SBAR, Kardex, Procedure Summary, Intake/Output, MAR, Recent Results and Med Rec Status.

## 2019-01-10 NOTE — PROGRESS NOTES
Podiatry Surgery Progress Note Patient: Dasia Pleitez MRN: [id number] LOS: 8 YOB: 1960  Age: 63 y.o. Sex: male Admit Date: 1/2/2019 POD 3 Days Post-Op Procedure:   Procedure(s): LEFT FOOT ADDITION RESECTION OF THE METATARSAL BONES FIRST, SECOND, THIRD, FORTH AND FIFTH AT THE TMA SITE, BONE BIOPSY OF 1ST METATARSAL, CONVERSION OF THE TMA SITE WITH FLAP CLOSURE Subjective:  
Dasia Pleitez is a 62 y/o seen at bedside this morning  state over all feeling. Patient is  currently resting quiet and no apparent distress. Review of Systems:   
  
Positives in [place]** General: denies chronic fatigue, weight loss, fever, anemia, bruising, depression, nervousness, panic attacks HEENT: denies ringing in ears,  dizzy spells, poor vision, glaucoma, sinus trouble, hoarseness GI:  denies bloating, heartburn, regurgitation, difficulty swallowing, painful swallowing, nausea, abdominal pain, decreased appetite Lungs: denies pneumonia, asthma, cough, SOB, hemoptysis Heart: denies chest pain, irregular heart beat Skin: denies rashes, hives, allergic reaction (left foot noted as above) Urinary: denies UTI, kidney stones,  blood in urine, Bones and Joints: denies arthritis, rheumatism, back pain, gout, osteoporosis Neurologic: denies headaches, numbness, tingling Endocrine: No Abnormal Findings Allergic/Immunologic:  No Abnormal Findings. Objective:  
 
Vitals Patient Vitals for the past 8 hrs: 
 BP Temp Pulse Resp SpO2 Weight 01/10/19 0753 135/78 98.6 °F (37 °C) 65 20 97 %   
01/10/19 0601      76.1 kg (167 lb 12.8 oz) 01/10/19 0557 127/69 98.4 °F (36.9 °C) 61 18 92 %   
01/10/19 0109 134/70 98.2 °F (36.8 °C) 76 18 95 %  I/O Current Shift No intake/output data recorded. I/O Last Three Shifts 01/08 1901 - 01/10 0700 In: 2000 [P.O.:1200; I.V.:800] Out: 500 [Urine:500] Data Review No results found for this or any previous visit (from the past 24 hour(s)). Physical Exam: 
 
Kana Odonnell  is a 63 y.o. male. Pleasant, alert and oriented x3, in no apparent distress. Patient is well-developed and nourished, with good attention to hygiene and body habitus. Mood and affect normal, appropriate to situation. Left foot: DSD intact with still spots of red bloody drainage. Sutures intact without gap. There is diffuse edema without an increase in temp to the tonja wound area. No ascending lymphangitis.  No active bloody drainage noted NO odor. Vascular Exam:  
Dorsalis Pedis 1/4 and Posterior Tibial 2/4  Bilaterally. Skin temp warm to warm. Pedal hair is decreased. There are not varicosities present.  
  
PVL study: KETURAH - resting KETURAH is normal 
No evidence of significant arterial disease in the lower extremity bilaterally at rest, with the exception of isolated posterior tibial disease bilaterally. 
  
Neurological Exam:  
Light touch protective sensation is absent to both feet. There is noted Loss of protective sensation 
  
 Musculoskeletal Exam:  
Muscle tone is normal for age. The muscle strength is 5/5 for the flexors, extensors, inverters, and everter's. 
  
Dermatological Exam:  
Skin is of abnormal texture and turgor with some atrophic skin changes noting decreased hair growth, nail changes (thickening), pigmentary changes, skin texture (thin,shiney), skin color (rubor, red) . 
  
Radiographic: PRE OP (see report for details) Left foot - left forefoot with subtle osseous erosions 
involving the distal margin of the first metatarsal head compatible with Osteomyelitis Path: neg osteomyelitis. Cultures both tissue and bone noted for growth (see report for details) Wound Presentation: Wound Toe (specify in comments) Anterior; Left (Active) DRESSING STATUS Clean, dry, and intact 1/7/2019 10:21 AM  
DRESSING TYPE Elastic bandage 1/7/2019 10:21 AM  
Non-Pressure Injury Full thickness (subcut/muscle) 1/6/2019  7:00 AM  
 Wound Length (cm) 6.9 cm 1/4/2019 10:15 AM  
Wound Width (cm) 5 cm 1/4/2019 10:15 AM  
Wound Depth (cm) 1 1/4/2019 10:15 AM  
Wound Surface area (cm^2) 34.5 cm^2 1/4/2019 10:15 AM  
Condition of Base Eschar;Slough 1/7/2019 10:21 AM  
Condition of Edges Open 1/6/2019  7:00 AM  
Tissue Type Black; Yellow 1/7/2019 10:21 AM  
Tissue Type Percent Black 50 % 1/4/2019 10:15 AM  
Tissue Type Percent Yellow 50 1/4/2019 10:15 AM  
Direction of Undermining 1    oclock;2    oclock;3    oclock;4    oclock;5    oclock;6    oclock;7    oclock;8    oclock;9    oclock;10    oclock;11    oclock 1/4/2019 10:15 AM  
Depth of Undermining (cm) 2 1/4/2019 10:15 AM  
Drainage Amount  Small  1/4/2019 10:15 AM  
Drainage Color Serous 1/4/2019 10:15 AM  
Wound Odor Foul 1/7/2019 10:21 AM  
Periwound Skin Condition Intact 1/7/2019 10:21 AM  
Cleansing and Cleansing Agents  Dermal wound cleanser;Betadine 1/6/2019  7:00 AM  
Dressing Type Applied Other (Comment);4 x 4;Gauze wrap (ting); Elastic bandage 1/6/2019  7:00 AM  
Procedure Tolerated Well 1/6/2019  7:00 AM  
Number of days: 7 Wound Foot Left (Active) DRESSING STATUS Breakthrough drainage;Removed 1/9/2019  1:17 AM  
DRESSING TYPE 4 x 4;Gauze wrap (ting); Elastic bandage 1/9/2019  1:17 AM  
Number of days: 3 Assessment:  
 
Principal Problem: 
  GI bleed (1/2/2019) Active Problems: 
  A-fib ([address] (1/2/2019) Hypocalcemia (1/2/2019) Hypokalemia (1/2/2019) PAD (peripheral artery disease) ([address] (1/2/2019) Acute blood loss anemia (1/2/2019) Osteomyelitis of left foot ([address] (1/4/2019) Overview: 1st metatarsal head Plan/Recommendations/Medical Decision Making:  
 
Reviewed once again all the above clinical and path / culture findings. 
  
Discussed once again all his choices will affect the out come of his healing.  The more he moves about, smoking and putting wt on the foot continue to put his foot at risk. Again we discussed he must NOT put any wt on the foot - by doing such he puts himself a higher risk of not healing and going onto a BKA.  Again he indicated that he understood and would stay NON WT bearing of the left surgical foot.  
  
Continue to have PT work with him to be NON wt bearing training with crutches and / or walker - transfer to toilet and bedside chair.  
  
DSD change with betadine sterile gauze and Kerlix.   
  
Continue LWC and IVabx Dr Karine Barillas Podiatric Surgeon [facility]) [phone] 
1/10/2019 
8:18 AM 
 
 
 
   
 
 verbal instruction/written material

## 2019-01-10 NOTE — PROGRESS NOTES
Prelim ID Consult (see Dictation [id number]) Patient: Kiley Deal CSN: [id number] YOB: 1960  Age: 63 y.o. Sex: male Current abx Prior abx  
vanco 1/10-0 unasyn 1/10-0 Ceftriaxone 1/10 x 1, zosyn 1/8-2, clinda x 1 1/7 Assessment ->Rec: Infected L distal foot ulcer  - sp LTMA 3/2018, wound opened up p spend day on feet moving ~11/1, now homeless 
-plain film 1/4 L TMA, poss 1st MT distal erosion, pod noted necrotic wd, ESR low 29, CRP 5.2 
-OR 1/7 revision TMA 
-path 1st pass 1-5 MT no OM and 2nd pass 1st MT also neg for OM  
-cultures from surgery mixed (initial GS GPC GPR, rpt NOS) incl Staph sp, E coli s all, E faecalis s amp, Proteus R bactrim ->agree with vanco pending cultures- if no MRSA or MR MARTIN iqbalgdenensis, likey stop it when cultures back 
->change ceftriaxone to unasyn pending OR cultures- covers anaerobes if present and also E faecalis, E coli and Proteus all sensitive 
->likely change unasyn to po augmentin, short course and remove picc at discharge unless podiatry indicates need for continued iv therapy (path neg for OM here). ->d/w him smoking cessation PAD sp RLE stent LLE FPB 3/2018 
-on plavix, asa pta 
-KETURAH 1/4 neg except B post tibial disease ->anticoag per IM, others Afib w/RVR poa - converted to sinus on cardizem gtt ->per cardiol Erosive esophagitis with CGE drop in HCT POA 
-sp EGD 1/3 ->protonix, repeat EGD in 6-8 weeks per GI Comorbid: HTN HLD  Depression h/o nephrolithiasis  ->per IM   
 
MICROBIOLOGY:  
1/7 afb sm neg x 2 
 1st TC GS GPC GPR,  E coli S all, Proteus R bactrim, E faecalis S amp, CoNS, alpha strep, diphtheroids prelim 2nd TC GS NOS, E coli, Proteus, E faecalis, Staph sp, diphtheroids, CoNS x 2 prelim Delia IP x 2 LINES AND CATHETERS:  
RUE picc 1/2 Thanks -- [facility] will follow with you JCSchwab, MD 
[facility] AT THE [facility] January 10, 2019 
[phone]

## 2019-01-10 NOTE — PROGRESS NOTES
Internal Medicine Progress Note Patient's Name: Andrew Madison Admit Date: 1/2/2019 Length of Stay: 8 Assessment/Plan Active Hospital Problems Diagnosis Date Noted  Osteomyelitis of left foot [facility] 01/04/2019 1st metatarsal head  [facility] 01/02/2019  GI bleed 01/02/2019  Hypocalcemia 01/02/2019  Hypokalemia 01/02/2019  PAD (peripheral artery disease) ([facility] 01/02/2019  Acute blood loss anemia 01/02/2019 Underweight with Bmi <19 Moderate malnutrition 
 
- Cont PO pronix 
- Cont toprol XL for rate control 
- Can restart plavix on discharge - F/u cardiology to discuss longterm AC 
- IVAB w/ transition to oral for total 4-5 days post clean margins - s/p TMA resection per podiatry - Culture w/ enterococcus, strep, staph species (sensitivity to zosyn, vanco) - Added one dose vanco given enterococcus grp D species - Change zosyn to ceftriaxone - Consult ID for assistance - PT w/ non wt bearing training - F/u podiatry - Pain control PRN 
- Wound care - Cont acceptable home medications for chronic conditions  
- DVT protocol I have personally reviewed all pertinent labs and films that have officially resulted over the last 24 hours. I have personally checked for all pending labs that are awaiting final results. Subjective Pt s/e @ bedside No major events overnight Afebrile overnight Doing ok and tolerating pain Denies CP or SOB Objective Visit Vitals /78 Pulse 65 Temp 98.6 °F (37 °C) Resp 20 Ht 6' 2\" (1.88 m) Wt 76.1 kg (167 lb 12.8 oz) SpO2 97% BMI 21.54 kg/m² Physical Exam: 
General Appearance: NAD, conversant Lungs: CTA with normal respiratory effort CV: RRR, no m/r/g Abdomen: soft, non-tender, normal bowel sounds Extremities: no cyanosis, LLE at level of foot/ankle in dressing w/ no visible blood Neuro: No focal deficits, motor/sensory intact Lab/Data Reviewed: 
BMP:  
 No results found for: NA, K, CL, CO2, AGAP, GLU, BUN, CREA, GFRAA, GFRNA 
CBC:  
No results found for: WBC, HGB, HGBEXT, HCT, HCTEXT, PLT, PLTEXT, HGBEXT, HCTEXT, PLTEXT Imaging Reviewed: 
No results found. Medications Reviewed: 
Current Facility-Administered Medications Medication Dose Route Frequency  vancomycin (VANCOCIN) 1500 mg in  ml infusion  1,500 mg IntraVENous ONCE  
 nicotine (NICODERM CQ) 21 mg/24 hr patch 1 Patch  1 Patch TransDERmal DAILY  oxyCODONE IR (ROXICODONE) tablet 10-15 mg  10-15 mg Oral Q4H PRN  piperacillin-tazobactam (ZOSYN) 3.375 g in 0.9% sodium chloride (MBP/ADV) 100 mL MBP  #EXTENDED 4-HOUR INFUSION##  3.375 g IntraVENous Q8H  
 senna (SENOKOT) tablet 17.2 mg  2 Tab Oral QHS  nicotine (NICODERM CQ) 7 mg/24 hr patch 1 Patch  1 Patch TransDERmal DAILY  metoprolol succinate (TOPROL-XL) XL tablet 25 mg  25 mg Oral DAILY  lactated Ringers infusion  50 mL/hr IntraVENous CONTINUOUS  
 pantoprazole (PROTONIX) tablet 40 mg  40 mg Oral ACB&D  
 0.9% sodium chloride infusion 250 mL  250 mL IntraVENous PRN  
 gabapentin (NEURONTIN) capsule 1,200 mg  1,200 mg Oral TID  venlafaxine-SR (EFFEXOR-XR) capsule 150 mg  150 mg Oral DAILY WITH BREAKFAST  sodium chloride (NS) flush 5-10 mL  5-10 mL IntraVENous Q8H  
 sodium chloride (NS) flush 5-10 mL  5-10 mL IntraVENous PRN  
 sodium chloride (NS) flush 10-30 mL  10-30 mL InterCATHeter PRN  
 sodium chloride (NS) flush 10 mL  10 mL InterCATHeter Q24H  
 sodium chloride (NS) flush 10 mL  10 mL InterCATHeter PRN  
 sodium chloride (NS) flush 10-40 mL  10-40 mL InterCATHeter Q8H  
 bacitracin 500 unit/gram packet 1 Packet  1 Packet Topical PRN  
 atorvastatin (LIPITOR) tablet 40 mg  40 mg Oral QHS  aspirin tablet 325 mg  325 mg Oral DAILY Francis Curtis DO Internal Medicine, Hospitalist 
Pager: [phone] [facility]

## 2019-01-11 VITALS
RESPIRATION RATE: 18 BRPM | BODY MASS INDEX: 21.53 KG/M2 | TEMPERATURE: 98.4 F | DIASTOLIC BLOOD PRESSURE: 76 MMHG | OXYGEN SATURATION: 99 % | HEIGHT: 74 IN | SYSTOLIC BLOOD PRESSURE: 121 MMHG | WEIGHT: 167.8 LBS | HEART RATE: 82 BPM

## 2019-01-11 LAB
BACTERIA SPEC CULT: ABNORMAL
GRAM STN SPEC: ABNORMAL
GRAM STN SPEC: ABNORMAL
SERVICE CMNT-IMP: ABNORMAL

## 2019-01-11 PROCEDURE — 74011000258 HC RX REV CODE- 258: Performed by: INTERNAL MEDICINE

## 2019-01-11 PROCEDURE — 74011250637 HC RX REV CODE- 250/637: Performed by: HOSPITALIST

## 2019-01-11 PROCEDURE — 74011250637 HC RX REV CODE- 250/637: Performed by: NURSE PRACTITIONER

## 2019-01-11 PROCEDURE — 74011250637 HC RX REV CODE- 250/637: Performed by: INTERNAL MEDICINE

## 2019-01-11 PROCEDURE — 74011250636 HC RX REV CODE- 250/636: Performed by: INTERNAL MEDICINE

## 2019-01-11 RX ORDER — OXYCODONE HYDROCHLORIDE 5 MG/1
5-10 TABLET ORAL
Qty: 24 TAB | Refills: 0 | Status: SHIPPED | OUTPATIENT
Start: 2019-01-11 | End: 2019-02-15

## 2019-01-11 RX ORDER — METOPROLOL SUCCINATE 25 MG/1
25 TABLET, EXTENDED RELEASE ORAL DAILY
Qty: 30 TAB | Refills: 0 | Status: SHIPPED | OUTPATIENT
Start: 2019-01-12

## 2019-01-11 RX ORDER — NALOXONE HYDROCHLORIDE 4 MG/.1ML
1 SPRAY NASAL
Qty: 1 EACH | Refills: 0 | Status: SHIPPED | OUTPATIENT
Start: 2019-01-11 | End: 2019-01-11

## 2019-01-11 RX ORDER — PANTOPRAZOLE SODIUM 40 MG/1
40 TABLET, DELAYED RELEASE ORAL DAILY
Qty: 30 TAB | Refills: 0 | Status: SHIPPED | OUTPATIENT
Start: 2019-01-11

## 2019-01-11 RX ORDER — CLOPIDOGREL BISULFATE 75 MG/1
75 TABLET ORAL DAILY
Qty: 30 TAB | Refills: 0 | Status: SHIPPED | OUTPATIENT
Start: 2019-01-11

## 2019-01-11 RX ORDER — AMOXICILLIN AND CLAVULANATE POTASSIUM 875; 125 MG/1; MG/1
1 TABLET, FILM COATED ORAL EVERY 12 HOURS
Qty: 8 TAB | Refills: 0 | Status: SHIPPED | OUTPATIENT
Start: 2019-01-11 | End: 2019-01-15

## 2019-01-11 RX ADMIN — PANTOPRAZOLE SODIUM 40 MG: 40 TABLET, DELAYED RELEASE ORAL at 06:37

## 2019-01-11 RX ADMIN — VANCOMYCIN HYDROCHLORIDE 1500 MG: 10 INJECTION, POWDER, LYOPHILIZED, FOR SOLUTION INTRAVENOUS at 11:00

## 2019-01-11 RX ADMIN — VENLAFAXINE HYDROCHLORIDE 150 MG: 150 CAPSULE, EXTENDED RELEASE ORAL at 09:49

## 2019-01-11 RX ADMIN — SODIUM CHLORIDE 3 G: 900 INJECTION INTRAVENOUS at 00:01

## 2019-01-11 RX ADMIN — OXYCODONE HYDROCHLORIDE 15 MG: 5 TABLET ORAL at 00:56

## 2019-01-11 RX ADMIN — SODIUM CHLORIDE 3 G: 900 INJECTION INTRAVENOUS at 05:47

## 2019-01-11 RX ADMIN — GABAPENTIN 1200 MG: 400 CAPSULE ORAL at 09:49

## 2019-01-11 RX ADMIN — OXYCODONE HYDROCHLORIDE 15 MG: 5 TABLET ORAL at 05:46

## 2019-01-11 RX ADMIN — VANCOMYCIN HYDROCHLORIDE 1500 MG: 10 INJECTION, POWDER, LYOPHILIZED, FOR SOLUTION INTRAVENOUS at 00:56

## 2019-01-11 RX ADMIN — ASPIRIN 325 MG ORAL TABLET 325 MG: 325 PILL ORAL at 09:49

## 2019-01-11 RX ADMIN — METOPROLOL SUCCINATE 25 MG: 25 TABLET, EXTENDED RELEASE ORAL at 09:49

## 2019-01-11 RX ADMIN — OXYCODONE HYDROCHLORIDE 15 MG: 5 TABLET ORAL at 09:49

## 2019-01-11 NOTE — PROGRESS NOTES
Assumed care of alert and oriented male pt. Pt angry and agitated-states he wants more pain medications but no one will listen to him. Pt received 15 mg of oxycodone at 0546-pt med regimen reviewed with pt. Rates pain 10/10. Assisted pt to reposition left leg.

## 2019-01-11 NOTE — PROGRESS NOTES
Infectious Disease Follow-up Admit Date: 1/2/2019 Current abx Prior abx  
augmentin 1/11- 0 Ceftriaxone 1/10 x 1, zosyn 1/8-2, clinda x 1 1/7 vanco 1/10-1 unasyn 1/10-1  
  
Assessment ->Rec:  
  
Infected L distal foot ulcer  - sp LTMA 3/2018, wound opened up p spend day on feet moving ~11/1, now homeless 
-plain film 1/4 L TMA, poss 1st MT distal erosion, pod noted necrotic wd, ESR low 29, CRP 5.2 
-OR 1/7 revision TMA 
-path 1st pass 1-5 MT no OM and 2nd pass 1st MT also neg for OM  
- cultures from surgery mixed (initial GS GPC GPR, rpt NOS)  MRSE, MR S capitis, E coli s all, E faecalis s amp, Proteus R bactrim (NO MRSA) -> dc Vancomycin, Unasyn 
-> augmentin 875/125 bid x 4 days 
-> d/w Dr. Arlette Lopes  
PAD sp RLE stent LLE FPB 3/2018 
-on plavix, asa pta 
-KETURAH 1/4 neg except B post tibial disease ->anticoag per IM, others Afib w/RVR poa  
- converted to sinus on cardizem gtt ->per cardiol Erosive esophagitis with CGE drop in HCT POA 
-sp EGD 1/3 ->protonix, repeat EGD in 6-8 weeks per GI Comorbid: HTN HLD  Depression h/o nephrolithiasis  ->per IM   
  
MICROBIOLOGY:  
1/7       afb sm neg x 2 
            1st TC GS GPC GPR,  E coli S all, Proteus R bactrim, E faecalis S amp, CoNS, alpha strep, diphtheroids prelim 2nd TC GS NOS, E coli, Proteus, E faecalis, Staph sp, diphtheroids, CoNS x 2 prelim Delia IP x 2 
  
LINES AND CATHETERS:  
RUE picc 1/2 Active Hospital Problems Diagnosis Date Noted  Osteomyelitis of left foot ([address] 01/04/2019 1st metatarsal head  A-fib ([address] 01/02/2019  GI bleed 01/02/2019  Hypocalcemia 01/02/2019  Hypokalemia 01/02/2019  PAD (peripheral artery disease) ([address] 01/02/2019  Acute blood loss anemia 01/02/2019 Subjective: Interval notes reviewed. Feels OK. Tells me he's going to the [employer]. No complaints offered. Current Facility-Administered Medications Medication Dose Route Frequency  nicotine (NICODERM CQ) 21 mg/24 hr patch 1 Patch  1 Patch TransDERmal DAILY  ampicillin-sulbactam (UNASYN) 3 g in 0.9% sodium chloride (MBP/ADV) 100 mL MBP  3 g IntraVENous Q6H  
 vancomycin (VANCOCIN) 1500 mg in  ml infusion  1,500 mg IntraVENous Q12H  
 [START ON 2019] VANCOMYCIN INFORMATION NOTE   Other ONCE  
 VANCOMYCIN INFORMATION NOTE   Other Rx Dosing/Monitoring  oxyCODONE IR (ROXICODONE) tablet 10-15 mg  10-15 mg Oral Q4H PRN  
 senna (SENOKOT) tablet 17.2 mg  2 Tab Oral QHS  metoprolol succinate (TOPROL-XL) XL tablet 25 mg  25 mg Oral DAILY  pantoprazole (PROTONIX) tablet 40 mg  40 mg Oral ACB&D  
 0.9% sodium chloride infusion 250 mL  250 mL IntraVENous PRN  
 gabapentin (NEURONTIN) capsule 1,200 mg  1,200 mg Oral TID  venlafaxine-SR (EFFEXOR-XR) capsule 150 mg  150 mg Oral DAILY WITH BREAKFAST  sodium chloride (NS) flush 5-10 mL  5-10 mL IntraVENous Q8H  
 sodium chloride (NS) flush 10-30 mL  10-30 mL InterCATHeter PRN  
 sodium chloride (NS) flush 10 mL  10 mL InterCATHeter Q24H  
 sodium chloride (NS) flush 10 mL  10 mL InterCATHeter PRN  
 sodium chloride (NS) flush 10-40 mL  10-40 mL InterCATHeter Q8H  
 bacitracin 500 unit/gram packet 1 Packet  1 Packet Topical PRN  
 atorvastatin (LIPITOR) tablet 40 mg  40 mg Oral QHS  aspirin tablet 325 mg  325 mg Oral DAILY Objective:  
 
Visit Vitals /76 (BP 1 Location: Left arm, BP Patient Position: At rest) Pulse 82 Temp 98.4 °F (36.9 °C) Resp 18 Ht 6' 2\" (1.88 m) Wt 76.1 kg (167 lb 12.8 oz) SpO2 99% BMI 21.54 kg/m² Temp (24hrs), Av.2 °F (36.8 °C), Min:98 °F (36.7 °C), Max:98.4 °F (36.9 °C) GENERAL: well-developed, well-nourished white male, appearing stated age, lying quietly in bed, in no acute distress HEENT:  Sclerae are anicteric, conjunctivae pink. Oropharynx without thrush. NECK:  No lymphadenopathy or thyromegaly. CHEST:  no crackles or wheezes. CARDIOVASCULAR:  Regular rate and rhythm, no rub, murmur, or gallop. ABDOMEN:  Soft and nontender. Bowel sounds are present and normoactive. There is no suprapubic tenderness. EXTREMITIES:  Left foot dressing intact NEUROLOGIC:  awake, alert, appropriate, and cooperative. Labs: Results:  
Chemistry No results for input(s): GLU, NA, K, CL, CO2, BUN, CREA, CA, AGAP, BUCR, TBIL, GPT, AP, TP, ALB, GLOB, AGRAT in the last 72 hours. CBC w/Diff No results for input(s): WBC, RBC, HGB, HCT, PLT, GRANS, LYMPH, EOS, HGBEXT, HCTEXT, PLTEXT in the last 72 hours. Microbiology Results No results for input(s): SDES, CULT in the last 72 hours. Henrik Mckeon MD 
January 11, 2019 [facility] [phone]

## 2019-01-11 NOTE — ROUTINE PROCESS
Bedside and Verbal shift change report given to [facility] (oncoming nurse) by Bedside and Verbal shift change report given to [facility]) by Deacon Leonardo RN 
 (offgoing nurse). Report included the following information SBAR, Kardex, Intake/Output, MAR and Recent Results.   
 (

## 2019-01-11 NOTE — PROGRESS NOTES
Transportation at Discharge:  1/11/19 Transport Company/Representative: Marcia AbadNicolasawill find a Medicaid cab provider. Transportation Phone number: [phone] Method of Transport: Medicaid Cab Estimated pick-up time: 2:30-5:30p Destination: [place] [address] Insurance Info: Marcia CRAVEN CCCP Authorization: [id number] Per Ceferino seaman/ [facility] Requesting Outcomes Manager:  Siri [place] Corrinne Hammonds, Care- ext [phone]

## 2019-01-11 NOTE — PROGRESS NOTES
Provided pt with written information on [facility], phone# provided & instructed pt to call for an appointment. Magali Monet RN,ext [phone].

## 2019-01-11 NOTE — PROGRESS NOTES
2200 -- Contacted by HUNTER Crowell, pt requested Charge RN. Pt expressed concerns that RN would not allow pt to leave room due to disturbing phone call from best friend. Pt educated while staff absolutely understands his concern for best friend and his current crisis, our immediate goal is his safety and providing a healing environment that will expedite his recovery to join his love ones as soon as possible. RN offered additional alternatives to those suggested by Karon Crowell that are line with MD ordered activity level. Pt refused anything other than being allowed to leave room and or unit. All pt's concerns addressed, request for new RN completed. HUNTER Kern now assigned. Pt is comfortable now that he has expressed his feelings and has new RN. 
 
[phone] -- RN at bedside with Julee Kern, assisted with vitals and PRN pain medication administration.

## 2019-01-11 NOTE — PROGRESS NOTES
Pt angry, demanding to be disconnected from IV so he can go smoke. Walked to desk and yelled at unit secretary-advised staff to call security if behavior continues. Smoking policy reinforced with pt.

## 2019-01-11 NOTE — PROGRESS NOTES
Pt refused home care services, \" I've been taking care of my foot since last April. Don't need a nurse, just a few supplies\". Bedside nurse nurse, Serena,notified. Care Management Interventions PCP Verified by CM: Yes 
Palliative Care Criteria Met (RRAT>21 & CHF Dx)?: No 
Mode of Transport at Discharge: Other (see comment) Transition of Care Consult (CM Consult): Home Health [facility] - INPATIENT: No 
Reason Outside [place]: Patient declined ordered home care/hospice services MyChart Signup: No 
Discharge Durable Medical Equipment: No 
Health Maintenance Reviewed: Yes Occupational Therapy Consult: No 
Speech Therapy Consult: No 
Current Support Network: Other Confirm Follow Up Transport: Family Plan discussed with Pt/Family/Caregiver: Yes [employer] Information Provided?: No 
Discharge Location Discharge Placement: Home with home health

## 2019-01-11 NOTE — DISCHARGE SUMMARY
Internal Medicine Discharge Summary        Patient: Junior Cid    YOB: 1960    Age:  63 y.o. Admit Date: 1/2/2019    Discharge Date: 1/11/2019    LOS:  LOS: 9 days     Discharge To: Home    Consults: Infectious Disease and Podiatry    Admission Diagnoses: GI bleed  A-fib [facility])    Discharge Diagnoses:    Problem List as of 1/11/2019 Date Reviewed: 1/3/2019          Codes Class Noted - Resolved    Osteomyelitis of left foot ([facility] ICD-10-CM: M86.9  ICD-9-CM: 730.27  1/4/2019 - Present    Overview Signed 1/4/2019 12:55 PM by Phillip Oleary MD     1st metatarsal head              A-fib ([facility] ICD-10-CM: I48.91  ICD-9-CM: 427.31  1/2/2019 - Present        * (Principal) GI bleed ICD-10-CM: K92.2  ICD-9-CM: 578.9  1/2/2019 - Present        Hypocalcemia ICD-10-CM: E83.51  ICD-9-CM: 275.41  1/2/2019 - Present        Hypokalemia ICD-10-CM: E87.6  ICD-9-CM: 276.8  1/2/2019 - Present        PAD (peripheral artery disease) ([facility] ICD-10-CM: I73.9  ICD-9-CM: 443.9  1/2/2019 - Present        Acute blood loss anemia ICD-10-CM: D62  ICD-9-CM: 285.1  1/2/2019 - Present        Toe amputation status, left ([facility] ICD-10-CM: R33.517  ICD-9-CM: V49.72  5/18/2018 - Present        PVD (peripheral vascular disease) ([facility] ICD-10-CM: I73.9  ICD-9-CM: 443.9  5/18/2018 - Present        ERRONEOUS ENCOUNTER--DISREGARD ICD-9-CM: 35627  5/14/2018 - Present              Discharge Condition:  Improved    Procedures: LEFT FOOT ADDITION RESECTION OF THE METATARSAL BONES FIRST, SECOND, THIRD, FORTH AND FIFTH AT THE TMA SITE, BONE BIOPSY OF 1ST METATARSAL, CONVERSION OF THE TMA SITE WITH FLAP CLOSURE  EGD         HPI: Junior Cid is a 63 y.o. male with history of PAD s/p lower extremity stent (3/2018) who presents due to weakness. Patient said yesterday he was all up all night vomiting dark brown coffee ground emesis. He denied dark stools. He said today he felt very weak and could not walk.  He presented to the ED for evaluation.      In the ED he was found to be in AF with RVR. Labs notable for severe electrolyte depletion. Hemoglobin 8.1 from baseline 11. Stool guaiac was positive. Pt denies h/o AF. Pt denies h/o CAD or stents. Pt was started on cardizem for rate control and protonix IV infusion for UGIB. He was admitted to ICU for further management    Hospital Course: The patient was taken for EGD the following morning with results listed below. His diet was resumed and he was continued on protonix daily per GI recommendations. His hemoglobin remained stable throughout his admission. He was able to be titrated off the diltiazem drip after converting back to NSR and was eventually restarted on ASA/plavix. Anticoagulation was not started during this admission but cardiology recommended follow up to discuss possible longterm AC in the outpatient setting. Podiatry was consulted the following day after EGD for his left foot infection due to poor wound healing. He had the procedure on 01/07, LEFT FOOT ADDITION RESECTION OF THE METATARSAL BONES FIRST, SECOND, THIRD, FORTH AND FIFTH AT THE TMA SITE, BONE BIOPSY OF 1ST METATARSAL, CONVERSION OF THE TMA SITE WITH FLAP CLOSURE. He tolerated the procedure well. He was treated with IV vancomycin and zosyn initially, but changed to unasyn based off ID recs. Organisms that grew included E faecalis, e coli, proteus and staph cap/epi. Infectious disease recommended transitioning to oral Augmentin at discharge for an additional 4 days given negative pathology for osteo. Podiatry recommended follow up early next week for wound check and dressing changes. Of note, the patient left his room and went outside without approval to smoke marijuana and cigarettes. He would ambulate while putting pressure on the healing extremity when podiatry specifically told him non-wt bearing training with crutches and/or walker.  The rest of the patient's chronic conditions were managed appropriately during their admission. They were medically stable at the time of discharge. EGD:  IMPRESSION:   1. Severe ulcerative esophagitis from mid esophagus to distal esophagus status post biopsy to evaluate for viral process. 2. Moderate size hiatal hernia. 3. Normal appearing stomach. 4. Normal appearing duodenum.     RECOMMENDATIONS:  1. Resume regular diet. 2. Continue pantoprazole  3. Full liquid diet. Visit Vitals  /76 (BP 1 Location: Left arm, BP Patient Position: At rest)   Pulse 82   Temp 98.4 °F (36.9 °C)   Resp 18   Ht 6' 2\" (1.88 m)   Wt 76.1 kg (167 lb 12.8 oz)   SpO2 99%   BMI 21.54 kg/m²       Physical Exam at Discharge:  General Appearance: NAD, conversant  HENT: normocephalic/atraumatic, moist mucus membranes  Lungs: CTA with normal respiratory effort  CV: RRR, no m/r/g  Abdomen: soft, non-tender, normal bowel sounds  Extremities: no cyanosis, LLE dressing in place  Neuro: moves all extremities, no focal deficits  Psych: appropriate affect, alert and oriented to person, place and time    Labs Prior to Discharge:  Labs: Results:       Chemistry No results for input(s): GLU, NA, K, CL, CO2, BUN, CREA, CA, AGAP, BUCR, TBIL, GPT, AP, TP, ALB, GLOB, AGRAT in the last 72 hours. CBC w/Diff No results for input(s): WBC, RBC, HGB, HCT, PLT, GRANS, LYMPH, EOS, HGBEXT, HCTEXT, PLTEXT in the last 72 hours. Cardiac Enzymes No results for input(s): CPK, CKND1, ULI in the last 72 hours. No lab exists for component: CKRMB, TROIP   Coagulation No results for input(s): PTP, INR, APTT in the last 72 hours. No lab exists for component: INREXT    Lipid Panel No results found for: CHOL, CHOLPOCT, CHOLX, CHLST, CHOLV, 663518, HDL, LDL, LDLC, DLDLP, 357400, VLDLC, VLDL, TGLX, TRIGL, TRIGP, TGLPOCT, CHHD, CHHDX   BNP No results for input(s): BNPP in the last 72 hours. Liver Enzymes No results for input(s): TP, ALB, TBIL, AP, SGOT, GPT in the last 72 hours.     No lab exists for component: DBIL Thyroid Studies No results found for: T4, T3U, TSH, TSHEXT         Significant Imaging:  Xr Foot Lt Min 3 V    Result Date: 1/4/2019  EXAM: Left foot 3 views INDICATION: Left foot infection. COMPARISON: May 12, 2018 _______________ FINDINGS: Dorsal, oblique, lateral projections of the left foot are performed. Postoperative changes of transmetatarsal amputation are redemonstrated. Moderately large area of skin ulceration is present involving the distal and medial portions of the left foot, with cortical erosions involving the margin of the first metatarsal head. Remaining metatarsal margins appear intact. No fracture. No retained radiopaque foreign object. Surgical clips noted throughout the soft tissues of the hindfoot. Prominent ossicle within the peroneal tendon sheath. _______________     IMPRESSION: 1. Transmetatarsal indication of the left forefoot with subtle osseous erosions involving the distal margin of the first metatarsal head compatible with osteomyelitis. 2. No evidence of fracture or retained radiopaque foreign object. Xr Chest Port    Result Date: 1/2/2019  AP portable chest: INDICATION: PICC line placement. PICC line is in reasonable position in the upper SVC. The lungs are clear. The heart and vascularity are normal. Mild prominence of the ascending aorta. Chronic left-sided rib fractures. Evidence of cervical spine corpectomy and fusion incompletely evaluated. IMPRESSION: PICC line in reasonable position. No active disease. Xr Chest Port    Result Date: 1/2/2019  EXAM: CHEST RADIOGRAPH, SINGLE VIEW CLINICAL INDICATION/HISTORY: chest pain, sob, and/or arrhythmia   >ADDITIONAL HISTORY: Fatigue and weakness, nausea, rapid heart rate x3 days. COMPARISON: 6/5/2018 TECHNIQUE: Portable frontal view of the chest was obtained. _______________ FINDINGS: SUPPORT DEVICES: None. HEART AND MEDIASTINUM: Cardiac silhouette is within normal range in size. Thoracic aorta is tortuous.  LUNGS AND PLEURAL SPACES: Pulmonary vessels are normal and the lungs are clear. Costophrenic angles are sharp. No pneumothorax. BONY THORAX AND SOFT TISSUES: Cervical laminectomies, anterior and posterior spinal hardware again noted. No acute changes. _______________     IMPRESSION: No active cardiopulmonary disease. Discharge Medications:     Current Discharge Medication List      START taking these medications    Details   metoprolol succinate (TOPROL-XL) 25 mg XL tablet Take 1 Tab by mouth daily. Qty: 30 Tab, Refills: 0      oxyCODONE IR (ROXICODONE) 5 mg immediate release tablet Take 1-2 Tabs by mouth every four (4) hours as needed. Max Daily Amount: 60 mg.  Qty: 24 Tab, Refills: 0    Associated Diagnoses: Toe amputation status, left (HCC)      pantoprazole (PROTONIX) 40 mg tablet Take 1 Tab by mouth daily. Qty: 30 Tab, Refills: 0      amoxicillin-clavulanate (AUGMENTIN) 875-125 mg per tablet Take 1 Tab by mouth every twelve (12) hours for 4 days. Qty: 8 Tab, Refills: 0         CONTINUE these medications which have CHANGED    Details   clopidogrel (PLAVIX) 75 mg tab Take 1 Tab by mouth daily. Qty: 30 Tab, Refills: 0      naloxone (NARCAN) 4 mg/actuation nasal spray 1 [place] by IntraNASal route once as needed for up to 1 dose. Qty: 1 Each, Refills: 0         CONTINUE these medications which have NOT CHANGED    Details   aspirin (ASPIRIN) 325 mg tablet 325 mg.      atorvastatin (LIPITOR) 40 mg tablet 40 mg.      venlafaxine-SR (EFFEXOR-XR) 150 mg capsule Take 1 Cap by mouth daily. Indications: ANXIETY WITH DEPRESSION  Qty: 90 Cap, Refills: 3      gabapentin (NEURONTIN) 100 mg capsule Take 1 Cap by mouth three (3) times daily. Qty: 90 Cap, Refills: 2      nicotine (NICODERM CQ) 21 mg/24 hr 1 Patch. therapeutic multivitamin-minerals (THERAGRAN-M) tablet Take 1 Tab by Mouth Once a Day.          STOP taking these medications       amLODIPine (NORVASC) 10 mg tablet Comments:   Reason for Stopping: HYDROcodone-acetaminophen (NORCO) 5-325 mg per tablet Comments:   Reason for Stopping:         potassium chloride SR (KLOR-CON 10) 10 mEq tablet Comments:   Reason for Stopping:         morphine CR (MS CONTIN) 15 mg CR tablet Comments:   Reason for Stopping:         oxyCODONE-acetaminophen (PERCOCET 10)  mg per tablet Comments:   Reason for Stopping:         melatonin 3 mg tablet Comments:   Reason for Stopping:         polyethylene glycol (MIRALAX) 17 gram packet Comments:   Reason for Stopping:         (No Medication Selected) Comments:   Reason for Stopping:               Activity: Non-weight bearing on LLE    Diet: Resume previous diet    Wound Care: DSD change with betadine sterile gauze and Kerlix    Follow-up:   Please follow up with your PCP within 7 days to discuss your recent hospitalization. Patient to arrange.   Podiatry in 3-5 days for wound check  Cardiology in 2-3 weeks         Total time spent including time spent on final examination and discharge discussion, discharge documentation and records reviewed and medication reconciliation: > 30 minutes    Lieutenant Jose Alberto DO  Internal Medicine, Hospitalist  Pager: [phone]

## 2019-01-11 NOTE — PROGRESS NOTES
Met with pt to discuss disposition, made him aware that has been declined by facilities. Also made him aware that he does not have skilled need & he doesn't qualify for long term care nursing home. He became loud, stating \"you're just going to send me out in the cold with my foot? You might as well cut my leg off\". Made him aware that he has been non compliant with NWB status & that he needs to take care of his foot. Asked him about affordability to rent room in boarding house, states he has NO money, asked him why he has no money left out of his check since he's been here for 8 days, states he has bills he's paying on & now he has NO money. Made him aware that we can try to get him in shelter, states his foot will not stay clean in a shelter, again made him aware that his foot is his responsibility. States he wants me to try facilities in [facility], [facility], again made him aware that he does not qualify for long term nursing home, however I will send out to [facility], posted in e-discharge. Magali MonetRN,ext [phone].

## 2019-01-11 NOTE — PROGRESS NOTES
Podiatry Surgery Progress Note Patient: Shaw Patterson MRN: [id number] LOS: 9 YOB: 1960  Age: 63 y.o. Sex: male Admit Date: 1/2/2019 POD 4 Days Post-Op Procedure:   Procedure(s): LEFT FOOT ADDITION RESECTION OF THE METATARSAL BONES FIRST, SECOND, THIRD, FORTH AND FIFTH AT THE TMA SITE, BONE BIOPSY OF 1ST METATARSAL, CONVERSION OF THE TMA SITE WITH FLAP CLOSURE Subjective:  
 
Shaw Patterson is a 64 y/o seen at bedside this morning  state over all feeling fine but having pain \"took pain pill and less than an hr pain back\". Patient is currently agitated acting very high strung but in no apparent distress. 
  
Review of Systems:   
  
Positives in [place]** General: denies chronic fatigue, weight loss, fever, anemia, bruising, depression, nervousness, panic attacks HEENT: denies ringing in ears,  dizzy spells, poor vision, glaucoma, sinus trouble, hoarseness GI:  denies bloating, heartburn, regurgitation, difficulty swallowing, painful swallowing, nausea, abdominal pain, decreased appetite Lungs: denies pneumonia, asthma, cough, SOB, hemoptysis Heart: denies chest pain, irregular heart beat Skin: denies rashes, hives, allergic reaction (left foot noted as above) Urinary: denies UTI, kidney stones,  blood in urine, Bones and Joints: denies arthritis, rheumatism, back pain, gout, osteoporosis Neurologic: denies headaches, numbness, tingling Endocrine: No Abnormal Findings Allergic/Immunologic:  No Abnormal Findings. Objective:  
 
Vitals Patient Vitals for the past 8 hrs: 
 BP Temp Pulse Resp SpO2  
01/11/19 1119 121/76 98.4 °F (36.9 °C) 82 18 99 % I/O Current Shift No intake/output data recorded. I/O Last Three Shifts 01/09 1901 - 01/11 0700 In: 2040 [P.O.:2040] Out: 1875 [[id number]] Data Review No results found for this or any previous visit (from the past 24 hour(s)). Physical Exam: Kana Odonnell  is a 63 y.o. male. Pleasant, alert and oriented x3, in no apparent distress. Patient is well-developed and nourished, with good attention to hygiene and body habitus. Patient sitting on side of the bed with foot in dependent position.   
  
Left foot: DSD intact with still spots of red bloody drainage. Sutures intact without gap. Today with moderate edema (foot hanging in dependent position) without an increase in temp to the tonja wound area. No ascending lymphangitis.  No active bloody drainage noted NO odor.  
  
Vascular Exam:  
Dorsalis Pedis 1/4 and Posterior Tibial 2/4  Bilaterally. Skin temp warm to warm. Pedal hair is decreased. There are not varicosities present.  
  
PVL study: KETURAH - resting KETURAH is normal 
No evidence of significant arterial disease in the lower extremity bilaterally at rest, with the exception of isolated posterior tibial disease bilaterally. 
  
Neurological Exam:  
Light touch protective sensation is absent to both feet. There is noted Loss of protective sensation 
  
 Musculoskeletal Exam:  
Muscle tone is normal for age. The muscle strength is 5/5 for the flexors, extensors, inverters, and everter's. 
  
Dermatological Exam:  
Skin is of abnormal texture and turgor with some atrophic skin changes noting decreased hair growth, nail changes (thickening), pigmentary changes, skin texture (thin,shiney), skin color (rubor, red) . 
  
Radiographic: PRE OP (see report for details) Left foot - left forefoot with subtle osseous erosions 
involving the distal margin of the first metatarsal head compatible with Osteomyelitis 
  
Path: neg osteomyelitis. 
  
Cultures both tissue and bone noted for growth (see report for details Wound Presentation: Wound Toe (specify in comments) Anterior; Left (Active) DRESSING STATUS Clean, dry, and intact 1/11/2019  9:00 AM  
DRESSING TYPE Gauze wrap (ting); Elastic bandage 1/11/2019  1:00 AM  
 Non-Pressure Injury Full thickness (subcut/muscle) 1/6/2019  7:00 AM  
Wound Length (cm) 6.9 cm 1/4/2019 10:15 AM  
Wound Width (cm) 5 cm 1/4/2019 10:15 AM  
Wound Depth (cm) 1 1/4/2019 10:15 AM  
Wound Surface area (cm^2) 34.5 cm^2 1/4/2019 10:15 AM  
Condition of Base Other (comment) 1/10/2019 10:00 PM  
Condition of Edges Open 1/6/2019  7:00 AM  
Tissue Type Black; Yellow 1/7/2019 10:21 AM  
Tissue Type Percent Black 50 % 1/4/2019 10:15 AM  
Tissue Type Percent Yellow 50 1/4/2019 10:15 AM  
Direction of Undermining 1    oclock;2    oclock;3    oclock;4    oclock;5    oclock;6    oclock;7    oclock;8    oclock;9    oclock;10    oclock;11    oclock 1/4/2019 10:15 AM  
Depth of Undermining (cm) 2 1/4/2019 10:15 AM  
Drainage Amount  Scant 1/10/2019 10:00 PM  
Drainage Color Serous 1/4/2019 10:15 AM  
Wound Odor Foul 1/7/2019 10:21 AM  
Periwound Skin Condition Intact 1/7/2019 10:21 AM  
Cleansing and Cleansing Agents  Dermal wound cleanser;Betadine 1/6/2019  7:00 AM  
Dressing Type Applied Other (Comment);4 x 4;Gauze wrap (ting); Elastic bandage 1/6/2019  7:00 AM  
Procedure Tolerated Well 1/6/2019  7:00 AM  
Number of days: 8 Wound Foot Left (Active) DRESSING STATUS Breakthrough drainage;Removed 1/9/2019  1:17 AM  
DRESSING TYPE 4 x 4;Gauze wrap (ting); Elastic bandage 1/9/2019  1:17 AM  
Number of days: 4 Assessment:  
 
Principal Problem: 
  GI bleed (1/2/2019) Active Problems: 
  A-fib ([address] (1/2/2019) Hypocalcemia (1/2/2019) Hypokalemia (1/2/2019) PAD (peripheral artery disease) ([address] (1/2/2019) Acute blood loss anemia (1/2/2019) Osteomyelitis of left foot ([facility] (1/4/2019) Overview: 1st metatarsal head Plan/Recommendations/Medical Decision Making:  
 
Reviewed once again all the above clinical findings. Discussed the importance of keeping his foot elevated and staying off the foot.  Again stressed that all his choices will affect the out come of his healing. The more he moves about, smoking and putting wt on the foot continue to put his foot at risk.   
 
ONCE again discussed he must NOT put any wt on the foot - by doing such he puts himself a higher risk of not healing and going onto a BKA.  Again he indicated that he understood and would stay NON WT bearing of the left surgical foot.  
  
DSD change with betadine sterile gauze and Kerlix.   
 
From Podiatric Surgery point of view he is good for discharge to SNF or with Home health. ON ABx per ID and Daily LWC with betadine solution and DSD. Follow up at the [facility] wound clinic or office in one week. Dr Alda Joseph Podiatric Surgeon [facility]) [phone] 
1/11/2019 
1:02 PM

## 2019-01-11 NOTE — PROGRESS NOTES
Pt with discharge orders-meds faxed to [facility] pharmacy. Pt instructed in discharge plan and follow up. IV vancomycin infusing at this time.

## 2019-01-11 NOTE — PROGRESS NOTES
PICC line discontinued-tip intact-pressure applied x 2minutes. Pt given verbal and written discharge instructions and detailed instructions regarding follow up with primary care doctor, cardiology, and podiatry. Pt has refused home health. Pt verbalizes understanding of discharge instructions, has received medications from [facility] pharmacy.

## 2019-01-11 NOTE — PROGRESS NOTES
At this time, no accepting LTC as pt does not qualify for long term nursing home, as he is self care. [facility], ext [phone].

## 2019-01-11 NOTE — OP NOTES
[facility]  OPERATIVE REPORT    Toribio Shearer  MR#: [id number]  : 1960  ACCOUNT #: [de-identified]   DATE OF SERVICE: 2019    PREOPERATIVE DIAGNOSIS:  Left foot necrotic nonhealing wound with dehiscence at the transmetatarsal amputation site again of his left foot, rule out osteomyelitis. POSTOPERATIVE DIAGNOSIS:  Left foot necrotic nonhealing wound with dehiscence at the transmetatarsal amputation site again of his left foot, rule out osteomyelitis. PROCEDURES PERFORMED:  Left foot re-do TMA with additional resection of the first metatarsal along with removal of sesamoids followed with a bone biopsy of the first metatarsal labeled as second pass along with second, third, fourth and fifth metatarsal remodeling and finally a rotational skin flap closure. SURGEON:  Karine Barillas DPM    ANESTHESIA:  IV sedation with a left ankle block anesthesia. COMPLICATIONS:  None. HEMOSTASIS:     ESTIMATED BLOOD LOSS:  Minimal.    SPECIMENS REMOVED: bone and tissue    IMPLANTS:  none    INDICATIONS FOR SURGERY:  The patient is a pleasant 60-year-old male who initially underwent a transmetatarsal amputation on 2018. Postoperatively, patient ambulated on his foot and the area gapped open and continued to drain and stay open until his current admission. At this time, presents for further surgical intervention. SURGICAL PROCEDURE:  The patient is brought to the operating room and left on his hospital bed. He received IV sedation and a left ankle block of anesthesia consisting of Marcaine, lidocaine combination without epinephrine. Once it was determined he was adequately anesthetized.       Attention was addressed to the distal medial aspect of his left foot where the necrotic eschar was resected starting at the dorsal medial portion brought out towards the second metatarsal and third metatarsal that was then followed around the junction between the necrotic tissue and between the nonviable and viable tissue that part was resected and cut through sharply down to the bone in that junction between the viable and nonviable tissues, removing some viable totally removing the nonviable. This was brought down plantarly back medially and totally excising out the necrotic area. There was some noted pustular drainage and noted continued necrotic tissue deep at the level of the bone. This was then removed in total down to bleeding tissue. The first metatarsal was then exposed at this point as well as second. The dorsal incision was lengthened laterally to the approximate level between the third and fourth metatarsal again down to bone. The first pass of the resection of the first metatarsal was made. This was done from a dorsal distal to plantar proximal resecting the distal portion of that metatarsal.  Dissection was continued deep and fully excising the sesamoid apparatus in conjunction with the first metatarsal noting some scar adhesions along the way. Intraoperative cultures were done of the bone and of the first metatarsal as well as the soft tissue ulceration site. The second pass wedge was then performed again removing from dorsal distal to plantar proximal.  This was passed off and again labeled as second pass rule out osteomyelitis. Attention was then addressed to the second metatarsal to reset the metatarsal parabola. This was done again on the second and third metatarsals, again axis oriented from dorsal distal to plantar proximal.  The fourth metatarsal was then evaluated and it felt also to be long as well as the fifth so therefore the dorsal incision was extended laterally and the fourth and fifth metatarsals were further resected again to maintain the metatarsal parabola for the transmetatarsal amputation.   All these cuts were made from dorsal distal to plantar proximal.  The plantar proximal portion of the remaining metatarsals were evaluated demonstrated to have a good smooth area. Three liters of antibiotic laden saline was then utilized to Pulsavac the area. Once this was done, additional new field was laid down, gloves were changed out and clean instrumentations were then utilized. The attention was then addressed to the plantar flap. Having now a void in the plantar medial section of the foot. The plantar flap was developed and rotated distally and medially closing down the wound underneath the first and second metatarsals converting it to an L-shaped incision. This was sutured down using 3-0 nylon simple interrupted fashion over a TLS size 10 drain. The patient was then placed in a dry sterile bulky dressing, had continued good vascularization through the case, some diminished flow noted initially and as the case went on, but there was noted vascular flow. He tolerated all procedures well. Tourniquet was never used and again discharged to recovery with a dry sterile bulky dressing.       AB Vargas / HARRISON  D: 01/10/2019 19:02     T: 01/10/2019 21:53  JOB #: [id number]

## 2019-01-11 NOTE — DISCHARGE INSTRUCTIONS
Patient Education        Foot Amputation: What to Expect at [facility] 33 amputation is surgery to remove part or all of your foot. Your doctor left as much healthy bone, skin, blood vessel, and nerve tissue as possible. After a foot amputation, you will probably have bandages, a rigid dressing, or a cast over the remaining part of your leg or foot. The leg or foot may be swollen for 4 weeks or longer after your surgery. If you have a rigid dressing or cast, your doctor will set up regular visits to change the dressing or cast and check the healing. If you have elastic bandages, your doctor will tell you how to change them. You may have pain in the remaining part of your foot. You also may think you have feeling or pain where your foot was. This is called phantom pain. It is common and may come and go for a year or longer. Your doctor can give you medicine for both types of pain. You may have been fitted with a temporary artificial foot while you were still in the hospital. If this is the case, your doctor will teach you how to care for it. If you are getting an artificial foot or prosthesis, you may need to get used to it before you return to work and your other activities. You will probably not wear it all the time, so you may need to learn how to use a wheelchair, crutches, or other device. You may have to make changes in your home. Your workplace may be able to make allowances for you. Having part or all of your foot removed is traumatic. Learning to live with new limitations can be hard and frustrating. You may feel depressed or grieve for your previous lifestyle. It is important to understand these feelings. Talking with your family, friends, and health professionals about your frustrations is an important part of your recovery. You may also find that it helps to talk with a person who has had an amputation.   Remember that even though losing a foot is difficult, it does not change who you are or prevent you from enjoying life. You will have to adapt and learn new ways to do things, but you will still be able to work and take part in sports and activities. And you can still learn, love, play, and live life to its fullest.  Many organizations can help you adjust to your new life. For example, you can find information at amputee-coalition.org. This care sheet gives you a general idea about how long it will take for you to recover. But each person recovers at a different pace. Follow the steps below to get better as quickly as possible. How can you care for yourself at home? Activity    · Be active. Talk to your doctor about what you can do. If you are active and use your remaining limb or foot, it will heal faster.     · You may shower when your doctor okays it. Wash the remaining limb or foot with mild soap and water, and pat it dry. You may need help doing this at first.     · You may be able to drive when you finish your rehab and have an artificial foot or prosthesis. You may need to adapt your car to your situation.     · You will probably be able to return to work and your usual routine when your remaining limb or foot heals. This may be as soon as 4 to 8 weeks after surgery. Diet    · You can eat your normal diet. If your stomach is upset, try bland, low-fat foods like plain rice, broiled chicken, toast, and yogurt.     · You may notice that your bowel movements are not regular right after your surgery. This is common. Try to avoid constipation and straining with bowel movements. Take a fiber supplement every day. If you have not had a bowel movement after a couple of days, ask your doctor about taking a mild laxative. Medicines    · Your doctor will tell you if and when you can restart your medicines.  He or she will also give you instructions about taking any new medicines.     · If you take blood thinners, such as warfarin (Coumadin), clopidogrel (Plavix), or aspirin, be sure to talk to your doctor. He or she will tell you if and when to start taking those medicines again. Make sure that you understand exactly what your doctor wants you to do.     · Be safe with medicines. Take pain medicines exactly as directed. ? If the doctor gave you a prescription medicine for pain, take it as prescribed. ? If you are not taking a prescription pain medicine, ask your doctor if you can take an over-the-counter medicine.     · If you think your pain medicine is making you sick to your stomach:  ? Take your medicine after meals (unless your doctor has told you not to). ? Ask your doctor for a different pain medicine.     · If your doctor prescribed antibiotics, take them as directed. Do not stop taking them just because you feel better. You need to take the full course of antibiotics.    Remaining limb or foot care    · You may have bandages, a rigid dressing, or a cast on your remaining limb or foot. Your doctor will tell you how to take care of it. Depending on your dressing and the doctor's instructions:  ? Check your remaining limb or foot daily for irritation, skin breaks, and redness. Tell your doctor about any problems you see. ? Wash your remaining limb or foot with mild soap and warm water every night. Pat it dry.     · If you have a temporary artificial foot, remove it before you go to sleep. Follow-up care is a key part of your treatment and safety. Be sure to make and go to all appointments, and call your doctor if you are having problems. It's also a good idea to know your test results and keep a list of the medicines you take. When should you call for help? Call 911 anytime you think you may need emergency care.  For example, call if:    · You passed out (lost consciousness).     · You have sudden chest pain and shortness of breath, or you cough up blood.     · You have severe trouble breathing.    Call your doctor now or seek immediate medical care if:    · You have loose stitches, or your incision comes open.     · You have bleeding from the incision in your remaining limb or foot that suddenly increases or does not stop when your doctor said it should.     · You have signs of infection, such as:  ? Increased pain, swelling, warmth, or redness. ? Red streaks leading from the incision. ? Pus draining from the incision. ? A fever.     · You are sick to your stomach or cannot keep fluids down.     · You have pain that does not get better after you take pain medicine.    Watch closely for any changes in your health, and be sure to contact your doctor if:    · You do not have a bowel movement after taking a laxative. Where can you learn more? Go to http://krystal-jenny.info/. Enter S901 in the search box to learn more about \"Foot Amputation: What to Expect at Home. \"  Current as of: November 29, 2017  Content Version: 11.8  © 3972-4226 [employer]. Care instructions adapted under license by [employer] (which disclaims liability or warranty for this information). If you have questions about a medical condition or this instruction, always ask your healthcare professional. [place] [address] any warranty or liability for your use of this information. Patient Education        Toe Amputation: What to Expect at [address] [place] [address] had amputation surgery to remove one or more of your toes. For most people, pain improves within a week after surgery. You may have stitches or sutures. The doctor will probably take these out about 10 days after the surgery. You may need to wear a cast or a special type of shoe for about 2 to 4 weeks. You may think you have feeling or pain where your toe had been. This is called phantom pain. It is common, and it may come and go for a year or longer. If you have this kind of pain, your doctor may prescribe medicine to treat it.   This care sheet gives you a general idea about how long it will take for you to recover. But each person recovers at a different pace. Follow the steps below to get better as quickly as possible. How can you care for yourself at home? Activity    · Rest when you feel tired. Getting enough sleep will help you recover.     · You may notice some changes in your balance when you walk. Your balance will improve over time.     · Try to walk each day if you are able. Start by walking a little more than you did the day before. Bit by bit, increase the amount you walk. Walking boosts blood flow and helps prevent blood clots.     · Prop up your foot and leg on a pillow when you ice it or anytime you sit or lie down during the next 3 days. Try to keep it above the level of your heart. This will help reduce swelling.     · Ask your doctor when you can drive again.     · You may shower, unless your doctor tells you not to. Keep the bandage dry. If the bandage has been removed, you can wash the area with warm water and soap. Pat the area dry.     · You will probably need to take about 4 weeks off from work or your normal routine. How much time you need to take off depends on the type of work you do and your overall health. Diet    · You can eat your normal diet. If your stomach is upset, try bland, low-fat foods like plain rice, broiled chicken, toast, and yogurt.     · You may notice that your bowel movements are not regular right after your surgery. This is common. Try to avoid constipation and straining with bowel movements. You may want to take a fiber supplement every day. If you have not had a bowel movement after a couple of days, ask your doctor about taking a mild laxative. Medicines    · Your doctor will tell you if and when you can restart your medicines. He or she will also give you instructions about taking any new medicines.     · If you take blood thinners, such as warfarin (Coumadin), clopidogrel (Plavix), or aspirin, be sure to talk to your doctor.  He or she will tell you if and when to start taking those medicines again. Make sure that you understand exactly what your doctor wants you to do.     · Take pain medicines exactly as directed. ? If the doctor gave you a prescription medicine for pain, take it as prescribed. ? If you are not taking a prescription pain medicine, ask your doctor if you can take an over-the-counter medicine.     · If your doctor prescribed antibiotics, take them as directed. Do not stop taking them just because you feel better. You need to take the full course of antibiotics.     · If you think your pain medicine is making you sick to your stomach:  ? Take your medicine after meals (unless your doctor has told you not to). ? Ask your doctor for a different pain medicine. Incision care    · Your doctor will probably remove the bandages after several days. Or your doctor may have you remove your bandages at home. Do not touch the surgery area. Keep it dry.     · Do not soak your foot until your doctor says it is okay. Follow-up care is a key part of your treatment and safety. Be sure to make and go to all appointments, and call your doctor if you are having problems. It's also a good idea to know your test results and keep a list of the medicines you take. When should you call for help? Call 911 anytime you think you may need emergency care.  For example, call if:    · You passed out (lost consciousness).     · You have sudden chest pain and shortness of breath, or you cough up blood.     · You have severe trouble breathing.    Call your doctor now or seek immediate medical care if:    · Your foot is cool or pale or changes color.     · You have numbness, tingling, or less feeling in your foot or your toes.     · You have pain that does not get better after you take pain medicine.     · You have loose stitches, or your incision comes open.     · Bright red blood has soaked through the bandage over your incision.     · You have signs of infection, such as:  ? Increased pain, swelling, warmth, or redness. ? Red streaks leading from the incision. ? Pus draining from the incision. ? A fever.    Watch closely for any changes in your health, and be sure to contact your doctor if:    · You do not have a bowel movement after taking a laxative. Where can you learn more? Go to http://krystal-jenny.info/. Enter U065 in the search box to learn more about \"Toe Amputation: What to Expect at Home. \"  Current as of: 2017  Content Version: 11.8  © 1548-8200 [employer]. Care instructions adapted under license by [employer] (which disclaims liability or warranty for this information). If you have questions about a medical condition or this instruction, always ask your healthcare professional. [address] any warranty or liability for your use of this information. Patient armband removed and shredded  MyChart Activation    Thank you for requesting access to [facility]. Please follow the instructions below to securely access and download your online medical record. [facility] allows you to send messages to your doctor, view your test results, renew your prescriptions, schedule appointments, and more. How Do I Sign Up? 1. In your internet browser, go to [email or web address]  2. Click on the First Time User? Click Here link in the Sign In box. You will be redirect to the New Member Sign Up page. 3. Enter your [facility] Access Code exactly as it appears below. You will not need to use this code after youve completed the sign-up process. If you do not sign up before the expiration date, you must request a new code. [facility] Access Code: [email or web address]  Expires: 2019  9:22 AM (This is the date your [facility] access code will )    4. Enter the last four digits of your Social Security Number (xxxx) and Date of Birth (mm/dd/yyyy) as indicated and click Submit.  You will be taken to the next sign-up page. 5. Create a Neural Analyticst ID. This will be your St[employer] login ID and cannot be changed, so think of one that is secure and easy to remember. 6. Create a St[employer] password. You can change your password at any time. 7. Enter your Password Reset Question and Answer. This can be used at a later time if you forget your password. 8. Enter your e-mail address. You will receive e-mail notification when new information is available in [address]. 9. Click Sign Up. You can now view and download portions of your medical record. 10. Click the Download Summary menu link to download a portable copy of your medical information. Additional Information    If you have questions, please visit the Frequently Asked Questions section of the Stam[employer] website at [email or web address]. [email or web address] Remember, [employer] is NOT to be used for urgent needs. For medical emergencies, dial 911.

## 2019-01-11 NOTE — PROGRESS NOTES
Bedside and Verbal shift change report given to [facility] RN (oncoming nurse) by Serenity Álvarez RN (offgoing nurse). Report included the following information SBAR, Kardex, Intake/Output, MAR, Recent Results and Cardiac Rhythm NSR. Serenity Álvarez RN emphasized to pt the importance of complying with bedrest orders in the presence of oncoming RN. Pt received medications as directed, received Roxicodone 15 mg PRN at 2100 hrs. RN reviewed with pt that he was on bedrest per orders from Viraj Bashir MD due to need to keep weight off of LLE. At approximately 2200 hrs pt called RN to room urgently. Pt stated that his roommate's girlfriend was missing and [facility] RN asked pt if friend was coming to see pt for assistance of some kind. Pt stated that he was not, but that pt must immediately stretch his legs. RN reiterated that the bedrest orders were for pt safety, and he needed to comply with them. Pt became agitated, returned to bed. Pt requested to speak to charge RN. Charge RN spoke with pt, addressed concerns. ] Turnover given to [employer], to include SBAR, kardex, I/O, MAR, cardiac rhythm NSR.

## 2019-01-11 NOTE — PROGRESS NOTES
Assumed care of patient upset about previous nurse providing care and asked for the charge nurse who came to see patient. Patient stated that his roommates girl friend has been missing and the police are actively looking for her and he was upset because he told his previous nurse about the situation. Patient wanted to leave the room and sit by the window in the lounge. The patient felt like the nurse did not express any concern over his problems. The charge nurse did apologize for his concerns but reinforced that our goal is his immediate care. Keeping him safe and making sure he  Is recovering, so he can get back home to his love ones. Patient stated he did not want to talk about it any longer but would talk to management about his concerns in the morning. I was able to do an examine and asked about doing wound care. Patient stated : The doctors have been doing it every morning. The patient did not want me to unwrap dressing from his foot. 2:53 AM 
Patient asleep and quiet, no s/s of distress or discomfort. Antibiotic continues to infuse, call bell within reach. [address] [place] [address] Rd., [address] Patient put light on and stated ' My dressing on this foot is  coming loose. I offered to change it but patient declined stating. \" The doctor usually comes in early and he'll change it I just wanted to let you know.' I offered again stating \" I'll gladly change it it for you\" , but patient declined again

## 2019-01-11 NOTE — PROGRESS NOTES
Pt walked off unit with IV meds infusing. Security called pt escorted returned to unit accompanied by nursing staff. Dr. Ezequiel Suarez paged.

## 2019-01-12 LAB
BACTERIA SPEC CULT: ABNORMAL
BACTERIA SPEC CULT: NORMAL
BACTERIA SPEC CULT: NORMAL
GRAM STN SPEC: ABNORMAL
SERVICE CMNT-IMP: ABNORMAL
SERVICE CMNT-IMP: NORMAL
SERVICE CMNT-IMP: NORMAL

## 2019-02-13 ENCOUNTER — HOSPITAL ENCOUNTER (OUTPATIENT)
Age: 59
Setting detail: OBSERVATION
Discharge: PSYCHIATRIC OR SUBSTANCE USE FACILITY | End: 2019-02-14
Attending: EMERGENCY MEDICINE | Admitting: HOSPITALIST
Payer: MEDICAID

## 2019-02-13 ENCOUNTER — APPOINTMENT (OUTPATIENT)
Dept: GENERAL RADIOLOGY | Age: 59
End: 2019-02-13
Attending: EMERGENCY MEDICINE
Payer: MEDICAID

## 2019-02-13 DIAGNOSIS — T81.42XA INFECTION OF DEEP INCISIONAL SURGICAL SITE AFTER PROCEDURE, INITIAL ENCOUNTER: ICD-10-CM

## 2019-02-13 DIAGNOSIS — R45.851 SUICIDAL IDEATIONS: Primary | ICD-10-CM

## 2019-02-13 PROBLEM — I10 HTN (HYPERTENSION): Status: ACTIVE | Noted: 2019-02-13

## 2019-02-13 PROBLEM — T81.30XA WOUND DEHISCENCE: Status: ACTIVE | Noted: 2019-02-13

## 2019-02-13 PROBLEM — F39 MOOD DISORDER (HCC): Status: ACTIVE | Noted: 2019-02-13

## 2019-02-13 LAB
ALBUMIN SERPL-MCNC: 3.9 G/DL (ref 3.4–5)
ALBUMIN/GLOB SERPL: 1.1 {RATIO} (ref 0.8–1.7)
ALP SERPL-CCNC: 160 U/L (ref 45–117)
ALT SERPL-CCNC: 15 U/L (ref 16–61)
AMPHET UR QL SCN: NEGATIVE
ANION GAP SERPL CALC-SCNC: 8 MMOL/L (ref 3–18)
AST SERPL-CCNC: 17 U/L (ref 15–37)
BARBITURATES UR QL SCN: NEGATIVE
BASOPHILS # BLD: 0.2 K/UL (ref 0–0.1)
BASOPHILS NFR BLD: 2 % (ref 0–2)
BENZODIAZ UR QL: NEGATIVE
BILIRUB SERPL-MCNC: 0.2 MG/DL (ref 0.2–1)
BUN SERPL-MCNC: 19 MG/DL (ref 7–18)
BUN/CREAT SERPL: 16 (ref 12–20)
CALCIUM SERPL-MCNC: 9.5 MG/DL (ref 8.5–10.1)
CANNABINOIDS UR QL SCN: POSITIVE
CHLORIDE SERPL-SCNC: 104 MMOL/L (ref 100–108)
CO2 SERPL-SCNC: 27 MMOL/L (ref 21–32)
COCAINE UR QL SCN: NEGATIVE
CREAT SERPL-MCNC: 1.2 MG/DL (ref 0.6–1.3)
DIFFERENTIAL METHOD BLD: ABNORMAL
EOSINOPHIL # BLD: 0.7 K/UL (ref 0–0.4)
EOSINOPHIL NFR BLD: 6 % (ref 0–5)
ERYTHROCYTE [DISTWIDTH] IN BLOOD BY AUTOMATED COUNT: 14 % (ref 11.6–14.5)
ETHANOL SERPL-MCNC: <3 MG/DL (ref 0–3)
GLOBULIN SER CALC-MCNC: 3.4 G/DL (ref 2–4)
GLUCOSE SERPL-MCNC: 101 MG/DL (ref 74–99)
HCT VFR BLD AUTO: 30.2 % (ref 36–48)
HDSCOM,HDSCOM: ABNORMAL
HGB BLD-MCNC: 9.6 G/DL (ref 13–16)
LACTATE BLD-SCNC: 1.53 MMOL/L (ref 0.4–2)
LYMPHOCYTES # BLD: 1.8 K/UL (ref 0.9–3.6)
LYMPHOCYTES NFR BLD: 15 % (ref 21–52)
MCH RBC QN AUTO: 27 PG (ref 24–34)
MCHC RBC AUTO-ENTMCNC: 31.8 G/DL (ref 31–37)
MCV RBC AUTO: 85.1 FL (ref 74–97)
METHADONE UR QL: NEGATIVE
MONOCYTES # BLD: 1 K/UL (ref 0.05–1.2)
MONOCYTES NFR BLD: 8 % (ref 3–10)
NEUTS SEG # BLD: 8.2 K/UL (ref 1.8–8)
NEUTS SEG NFR BLD: 69 % (ref 40–73)
OPIATES UR QL: NEGATIVE
PCP UR QL: NEGATIVE
PLATELET # BLD AUTO: 387 K/UL (ref 135–420)
PMV BLD AUTO: 9.8 FL (ref 9.2–11.8)
POTASSIUM SERPL-SCNC: 3.8 MMOL/L (ref 3.5–5.5)
PROT SERPL-MCNC: 7.3 G/DL (ref 6.4–8.2)
RBC # BLD AUTO: 3.55 M/UL (ref 4.7–5.5)
SODIUM SERPL-SCNC: 139 MMOL/L (ref 136–145)
WBC # BLD AUTO: 12 K/UL (ref 4.6–13.2)

## 2019-02-13 PROCEDURE — 74011250637 HC RX REV CODE- 250/637: Performed by: HOSPITALIST

## 2019-02-13 PROCEDURE — 74011250636 HC RX REV CODE- 250/636: Performed by: HOSPITALIST

## 2019-02-13 PROCEDURE — 87040 BLOOD CULTURE FOR BACTERIA: CPT

## 2019-02-13 PROCEDURE — 74011000250 HC RX REV CODE- 250

## 2019-02-13 PROCEDURE — 99285 EMERGENCY DEPT VISIT HI MDM: CPT

## 2019-02-13 PROCEDURE — 74011000258 HC RX REV CODE- 258: Performed by: EMERGENCY MEDICINE

## 2019-02-13 PROCEDURE — 96366 THER/PROPH/DIAG IV INF ADDON: CPT

## 2019-02-13 PROCEDURE — 65270000029 HC RM PRIVATE

## 2019-02-13 PROCEDURE — 96367 TX/PROPH/DG ADDL SEQ IV INF: CPT

## 2019-02-13 PROCEDURE — 73630 X-RAY EXAM OF FOOT: CPT

## 2019-02-13 PROCEDURE — 74011000258 HC RX REV CODE- 258: Performed by: HOSPITALIST

## 2019-02-13 PROCEDURE — 74011250637 HC RX REV CODE- 250/637: Performed by: EMERGENCY MEDICINE

## 2019-02-13 PROCEDURE — 74011250636 HC RX REV CODE- 250/636

## 2019-02-13 PROCEDURE — 80307 DRUG TEST PRSMV CHEM ANLYZR: CPT

## 2019-02-13 PROCEDURE — 96365 THER/PROPH/DIAG IV INF INIT: CPT

## 2019-02-13 PROCEDURE — 80053 COMPREHEN METABOLIC PANEL: CPT

## 2019-02-13 PROCEDURE — 77030018836 HC SOL IRR NACL ICUM -A

## 2019-02-13 PROCEDURE — 83605 ASSAY OF LACTIC ACID: CPT

## 2019-02-13 PROCEDURE — 85025 COMPLETE CBC W/AUTO DIFF WBC: CPT

## 2019-02-13 PROCEDURE — 74011250636 HC RX REV CODE- 250/636: Performed by: EMERGENCY MEDICINE

## 2019-02-13 RX ORDER — ATORVASTATIN CALCIUM 40 MG/1
40 TABLET, FILM COATED ORAL DAILY
Status: DISCONTINUED | OUTPATIENT
Start: 2019-02-13 | End: 2019-02-14 | Stop reason: HOSPADM

## 2019-02-13 RX ORDER — PIPERACILLIN SODIUM, TAZOBACTAM SODIUM 4; .5 G/20ML; G/20ML
INJECTION, POWDER, LYOPHILIZED, FOR SOLUTION INTRAVENOUS
Status: COMPLETED
Start: 2019-02-13 | End: 2019-02-13

## 2019-02-13 RX ORDER — GABAPENTIN 100 MG/1
100 CAPSULE ORAL 3 TIMES DAILY
Status: DISCONTINUED | OUTPATIENT
Start: 2019-02-13 | End: 2019-02-14 | Stop reason: HOSPADM

## 2019-02-13 RX ORDER — BACITRACIN 500 UNIT/G
PACKET (EA) TOPICAL
Status: COMPLETED
Start: 2019-02-13 | End: 2019-02-13

## 2019-02-13 RX ORDER — BACITRACIN 500 UNIT/G
1 PACKET (EA) TOPICAL 2 TIMES DAILY
Status: DISCONTINUED | OUTPATIENT
Start: 2019-02-13 | End: 2019-02-14 | Stop reason: HOSPADM

## 2019-02-13 RX ORDER — METOPROLOL SUCCINATE 25 MG/1
25 TABLET, EXTENDED RELEASE ORAL DAILY
Status: DISCONTINUED | OUTPATIENT
Start: 2019-02-13 | End: 2019-02-14 | Stop reason: SDUPTHER

## 2019-02-13 RX ORDER — SODIUM CHLORIDE 900 MG/100ML
INJECTION INTRAVENOUS
Status: DISCONTINUED
Start: 2019-02-13 | End: 2019-02-13 | Stop reason: WASHOUT

## 2019-02-13 RX ORDER — CLOPIDOGREL BISULFATE 75 MG/1
75 TABLET ORAL DAILY
Status: DISCONTINUED | OUTPATIENT
Start: 2019-02-13 | End: 2019-02-14 | Stop reason: HOSPADM

## 2019-02-13 RX ORDER — SODIUM CHLORIDE 900 MG/100ML
INJECTION INTRAVENOUS
Status: DISPENSED
Start: 2019-02-13 | End: 2019-02-13

## 2019-02-13 RX ORDER — VANCOMYCIN 2 GRAM/500 ML IN 0.9 % SODIUM CHLORIDE INTRAVENOUS
2000 ONCE
Status: COMPLETED | OUTPATIENT
Start: 2019-02-13 | End: 2019-02-13

## 2019-02-13 RX ORDER — PANTOPRAZOLE SODIUM 40 MG/1
40 TABLET, DELAYED RELEASE ORAL
Status: DISCONTINUED | OUTPATIENT
Start: 2019-02-13 | End: 2019-02-14 | Stop reason: HOSPADM

## 2019-02-13 RX ORDER — HYDROCODONE BITARTRATE AND ACETAMINOPHEN 5; 325 MG/1; MG/1
1 TABLET ORAL
Status: DISCONTINUED | OUTPATIENT
Start: 2019-02-13 | End: 2019-02-14 | Stop reason: HOSPADM

## 2019-02-13 RX ORDER — HYDROCODONE BITARTRATE AND ACETAMINOPHEN 5; 325 MG/1; MG/1
1 TABLET ORAL
Status: DISCONTINUED | OUTPATIENT
Start: 2019-02-13 | End: 2019-02-13

## 2019-02-13 RX ORDER — IBUPROFEN 200 MG
1 TABLET ORAL DAILY
Status: DISCONTINUED | OUTPATIENT
Start: 2019-02-13 | End: 2019-02-14 | Stop reason: HOSPADM

## 2019-02-13 RX ADMIN — PIPERACILLIN SODIUM,TAZOBACTAM SODIUM 4.5 G: 4; .5 INJECTION, POWDER, FOR SOLUTION INTRAVENOUS at 08:43

## 2019-02-13 RX ADMIN — METOPROLOL SUCCINATE 25 MG: 50 TABLET, EXTENDED RELEASE ORAL at 09:01

## 2019-02-13 RX ADMIN — ARIPIPRAZOLE 15 MG: 5 TABLET ORAL at 09:01

## 2019-02-13 RX ADMIN — PANTOPRAZOLE SODIUM 40 MG: 40 TABLET, DELAYED RELEASE ORAL at 09:01

## 2019-02-13 RX ADMIN — BACITRACIN 2 PACKET: 500 OINTMENT TOPICAL at 07:01

## 2019-02-13 RX ADMIN — ATORVASTATIN CALCIUM 40 MG: 40 TABLET, FILM COATED ORAL at 09:01

## 2019-02-13 RX ADMIN — HYDROCODONE BITARTRATE AND ACETAMINOPHEN 1 TABLET: 5; 325 TABLET ORAL at 13:49

## 2019-02-13 RX ADMIN — BACITRACIN ZINC 2 PACKET: 500 OINTMENT TOPICAL at 07:01

## 2019-02-13 RX ADMIN — VANCOMYCIN HYDROCHLORIDE 2000 MG: 10 INJECTION, POWDER, LYOPHILIZED, FOR SOLUTION INTRAVENOUS at 03:24

## 2019-02-13 RX ADMIN — PIPERACILLIN SODIUM,TAZOBACTAM SODIUM 4.5 G: 4; .5 INJECTION, POWDER, FOR SOLUTION INTRAVENOUS at 09:05

## 2019-02-13 RX ADMIN — CLOPIDOGREL BISULFATE 75 MG: 75 TABLET, FILM COATED ORAL at 09:01

## 2019-02-13 RX ADMIN — HYDROCODONE BITARTRATE AND ACETAMINOPHEN 1 TABLET: 5; 325 TABLET ORAL at 09:01

## 2019-02-13 RX ADMIN — PIPERACILLIN SODIUM,TAZOBACTAM SODIUM 3.38 G: 3; .375 INJECTION, POWDER, FOR SOLUTION INTRAVENOUS at 22:00

## 2019-02-13 RX ADMIN — PIPERACILLIN SODIUM,TAZOBACTAM SODIUM 4.5 G: 4; .5 INJECTION, POWDER, FOR SOLUTION INTRAVENOUS at 02:50

## 2019-02-13 RX ADMIN — GABAPENTIN 100 MG: 100 CAPSULE ORAL at 22:01

## 2019-02-13 RX ADMIN — PIPERACILLIN SODIUM,TAZOBACTAM SODIUM 3.38 G: 3; .375 INJECTION, POWDER, FOR SOLUTION INTRAVENOUS at 13:42

## 2019-02-13 RX ADMIN — SODIUM CHLORIDE 1000 MG: 900 INJECTION, SOLUTION INTRAVENOUS at 14:49

## 2019-02-13 NOTE — PROGRESS NOTES
6:16 AM :Pt care assumed from Dr. Dakota Rhodes, ED provider. Pt complaint(s), current treatment plan, progression and available diagnostic results have been discussed thoroughly. Rounding occurred: yes Intended Disposition: Home or ADMIT Pending diagnostic reports and/or labs (please list): Waiting for Podiatry consult. 6:58 AM 
Wound moderate eschar mild erythema no drainage will clean and bandage Consult:  Discussed care with Dr. Luz Schmidt, Specialty: Podiatry  Standard discussion; including history of patients chief complaint, available diagnostic results, and treatment course. He will see the pt. 
7:11 AM, 2/13/2019  
 
8:10AM Discussed with Dr. Luz Schmidt. Recommends admission to the hospitalist for IV abx. 
 
8:23 AM Consult:  Discussed care with Dr. Kalya Alaniz, Hospitalist. Standard discussion; including history of patients chief complaint, available diagnostic results, and treatment course. He agrees with plan for admission. __________________________________________________ Scribe Attestation Natalya Otto acting as a scribe for and in the presence of Marianne Nixon MD     
February 13, 2019 at 6:16 AM 
    
Provider Attestation:     
I personally performed the services described in the documentation, reviewed the documentation, as recorded by the scribe in my presence, and it accurately and completely records my words and actions.  February 13, 2019 at 6:16 AM - Marianne Nixon MD

## 2019-02-13 NOTE — ED NOTES
TRANSFER - ED to INPATIENT REPORT: 
 
SBAR report made available to receiving floor on this patient being transferred to [facility] ([id number])  for routine progression of care Admitting diagnosis Wound dehiscence Velma Fairchild Information from the following report(s) SBAR, ED Summary, MAR, Recent Results, Med Rec Status and Cardiac Rhythm NSR was made available to receiving floor. Lines:  
Peripheral IV 02/13/19 Left Hand (Active) Site Assessment Clean, dry, & intact 2/13/2019  2:35 AM  
Phlebitis Assessment 0 2/13/2019  2:35 AM  
Infiltration Assessment 0 2/13/2019  2:35 AM  
Dressing Status Clean, dry, & intact 2/13/2019  2:35 AM  
Dressing Type Transparent 2/13/2019  2:35 AM  
Hub Color/Line Status Pink 2/13/2019  2:35 AM  
  
 
Medication list confirmed with patient Opportunity for questions and clarification was provided. Patient is oriented to time, place, person and situation . Patient is  continent and ambulatory without assist 
  
Valuables transported with patient Patient transported with: 
 Registered Nurse =Monitored (most recent) Vitals w/ MEWS Score (last day) Date/Time MEWS Score Pulse Resp Temp BP Level of Consciousness SpO2  
 02/13/19 07:27:28  1  55  (Abnormal)   20  98 °F (36.7 °C)  140/96  (Abnormal)   Alert  98 % 02/13/19 03:32:35    76  16    122/62    100 % 02/13/19 0120  1  93  16  97.8 °F (36.6 °C)  160/101  (Abnormal)   Alert  98 % Septic Patients:  
 
Lactic Acid Lab Results Component Value Date LACPO 1.53 02/13/2019 LACPOC 1.5 06/30/2018  
 (Most recent on top) Repeat drawn: NO Time: . ALL LACTIC ACIDS GREATER THAN 2 MUST BE REPEATED POC WITHIN 4 HOURS OR PRIOR TO ADMISSION Total Fluid Bolus initiated and documented on MAR: NO All ordered antibiotics initiated within first 3 hours of TIME ZERO?   NO

## 2019-02-13 NOTE — PROGRESS NOTES
Pt received to unit from ED via stretcher, accompanied by Jd Ordoñez RN and 1:1 sitter. Suicide Precautions in place. He is A&Ox4, denies having suicide ideations; denies wanting to hurt self and/ or others. No s/s of distress noted. Call bell in reach. Bell locked in low position. See Admission assessment for further findings. 1212- Patient had requested pain med and said that [address] did not help his pain; noted to [facility] was not due. Dr. Pari Carey was called made aware T.O. change Norco frequency from every 6 hours prn to every 4 hours prn (RBV). 1:1 sitter remains at bedside. Call bell w/in reach. 1341- Patient resting and easily awaken for antibiotic infusion. Patient c/o having left foot pain 10/10. See MAR for prn Norco to be given. 1:1 sitter remains at bedside. Call bell w/in reach. 1440- Patient with eyes closed and snoring; easily awaken for f/u pain reassessment said that his pain level is \"a ten. \" 1:1 sitter remains at bedside. Call bell w/in reach. [address] [facility],  [address] ([facility] [address]) was call to ask about wound care order for Medihoney Paste said to call Pharmacy. Sangita Steele (Pharmacist) was called made aware said that pharmacy does not carry. Dr. Krystian Hernandez was called to inform; awaiting call back. 18- Dr. Krystian Hernandez return made aware Medihoney not carried by in house Pharmacy; voiced understanding. T.O. received for to change to Aguacel instead of Medihoney to left foot wound; see modified order (RBV).

## 2019-02-13 NOTE — CONSULTS
Podiatry Consult    Subjective:         Date of Consultation: February 13, 2019     Referring Physician: Yvon Fuentes and Jonna Burleson    Patient is a 63 y.o. male who found by security outside the hospital with c/o of diff walking and increased pain to the left foot. Denies fever / chills. He was seen at bedside today in the ED resting quietly and in no apparent distress. He admits to continue to walking on the foot and not using crutches and that he left in the motel room.      Patient Active Problem List    Diagnosis Date Noted    Wound dehiscence 02/13/2019    Suicidal intent 02/13/2019    Osteomyelitis of left foot ([facility] 01/04/2019    A-fib ([facility] 01/02/2019    GI bleed 01/02/2019    Hypocalcemia 01/02/2019    Hypokalemia 01/02/2019    PAD (peripheral artery disease) ([facility] 01/02/2019    Acute blood loss anemia 01/02/2019    Toe amputation status, left ([facility] [facility] 05/18/2018    PVD (peripheral vascular disease) ([facility] 05/18/2018     Past Medical History:   Diagnosis Date    Calculus of kidney     Depression     Hypertension     Sleep disorder     Vascular disorder of lower extremity     bilateral      Past Surgical History:   Procedure Laterality Date    HX AMPUTATION TOE Left     x 5 toes    HX HEENT      VASCULAR SURGERY PROCEDURE UNLIST        Family History   Problem Relation Age of Onset    Arthritis-osteo Mother     Diabetes Father     Heart Disease Father     Melanoma Father     Psychiatric Disorder Brother       Social History     Tobacco Use    Smoking status: Current Every Day Smoker     Packs/day: 0.25    Smokeless tobacco: Never Used   Substance Use Topics    Alcohol use: Yes     Current Facility-Administered Medications   Medication Dose Route Frequency Provider Last Rate Last Dose    metoprolol succinate (TOPROL-XL) XL tablet 25 mg  25 mg Oral DAILY Jason Couch MD   25 mg at 02/13/19 0901    pantoprazole (PROTONIX) tablet 40 mg  40 mg Oral ACB Jason Couch MD   40 mg at 02/13/19 0901    clopidogrel (PLAVIX) tablet 75 mg  75 mg Oral DAILY Shilpi Tran MD   75 mg at 02/13/19 0901    atorvastatin (LIPITOR) tablet 40 mg  40 mg Oral DAILY Shilpi Tran MD   40 mg at 02/13/19 0901    nicotine (NICODERM CQ) 21 mg/24 hr patch 1 Patch  1 Patch TransDERmal DAILY Shilpi Tran MD   1 Patch at 02/13/19 0305    0.9% sodium chloride (MBP/ADV) infusion             vancomycin (VANCOCIN) 1,000 mg in 0.9% sodium chloride (MBP/ADV) 250 mL adv  1,000 mg IntraVENous Q12H Shilpi Tran MD        VANCOMYCIN INFORMATION NOTE   Other ONCE Shilpi Tran MD        VANCOMYCIN INFORMATION NOTE 1 Each  1 Each Other Rx Dosing/Monitoring Shilpi Tran MD        ARIPiprazole (ABILIFY) tablet 15 mg  15 mg Oral DAILY Shilpi Tran MD   15 mg at 02/13/19 0901    bacitracin 500 unit/gram packet 1 Packet  1 Packet Topical BID Richie Dacosta MD   2 Packet at 02/13/19 0701    piperacillin-tazobactam (ZOSYN) 3.375 g in 0.9% sodium chloride (MBP/ADV) 100 mL MBP \"EXTENDED 4 HOUR INFUSION###\"   3.375 g IntraVENous Q8H Dale Fonseca MD        HYDROcodone-acetaminophen [facility]) 5-325 mg per tablet 1 Tab  1 Tab Oral Q4H PRN Dale Fonseca MD          No Known Allergies     Review of Systems:    [employer] in [place]**  General: denies chronic fatigue, weight loss, fever, anemia, bruising, depression, nervousness, panic attacks  HEENT: denies ringing in ears,  dizzy spells, poor vision, glaucoma, sinus trouble, hoarseness  GI:  denies bloating, heartburn, regurgitation, difficulty swallowing, painful swallowing, nausea, abdominal pain, decreased appetite  Lungs: denies pneumonia, asthma, cough, SOB, hemoptysis  Heart: denies chest pain, irregular heart beat  Skin: denies rashes, hives, allergic reaction (left foot noted as below)  Urinary: denies UTI, kidney stones,  blood in urine,   Bones and Joints: denies arthritis, rheumatism, back pain, gout, osteoporosis  Neurologic: denies headaches, numbness, tingling   Endocrine: No Abnormal Findings  Allergic/Immunologic:  No Abnormal Findings. Objective:     Patient Vitals for the past 8 hrs:   BP Temp Pulse Resp SpO2   19 0925 124/74 98 °F (36.7 °C) 74 16 100 %   19 0727 (!) 140/96 98 °F (36.7 °C) (!) 55 20 98 %     Temp (24hrs), Av.9 °F (36.6 °C), Min:97.8 °F (36.6 °C), Max:98 °F (36.7 °C)      Physical Exam:    Germania Casey  is a 63 y.o. male who is pleasant, alert and oriented x3, in no apparent distress. Patient is well-developed and nourished, with good attention to hygiene and body habitus. Mood and affect normal, appropriate to situation.     Left foot: s/p TMA site There is some central gapping of the incison site with some moderate eschar, mild edema, no active weeping / drainage. There is noted odor. Today with malodor present and some diffuse erythema present localized to the tonja wound area. No ascending lymphangitis.      Vascular Exam:   Dorsalis Pedis 1/4 and Posterior Tibial 2/4  Bilaterally. Skin temp warm to warm. Pedal hair is decreased. There are not varicosities present.      Neurological Exam:   Light touch protective sensation is absent to both feet. There is noted Loss of protective sensation.      Musculoskeletal Exam:   Muscle tone is normal for age. The muscle strength is 5/5 for the flexors, extensors, inverters, and everter's.     Dermatological Exam:   Skin is of abnormal texture and turgor with some atrophic skin changes noting decreased hair growth, nail changes (thickening), pigmentary changes, skin texture (thin,shiney), skin color (rubor, red) . RADIOLOGICAL EXAM: (see report for details)  Without superimposed acute radiographic abnormality - no discrete osseous erosions. Wound Presentation:  Wound Toe (specify in comments) Anterior; Left (Active)   Number of days: 41       Wound Foot Left (Active)   Number of days: 37        Lab Review:   Recent Results (from the past 24 hour(s))   DRUG SCREEN, URINE    Collection Time: 02/13/19  1:45 AM   Result Value Ref Range    BENZODIAZEPINES NEGATIVE  NEG      BARBITURATES NEGATIVE  NEG      THC (TH-CANNABINOL) POSITIVE (A) NEG      OPIATES NEGATIVE  NEG      PCP(PHENCYCLIDINE) NEGATIVE  NEG      COCAINE NEGATIVE  NEG      AMPHETAMINES NEGATIVE  NEG      METHADONE NEGATIVE  NEG      HDSCOM (NOTE)    CULTURE, BLOOD    Collection Time: 02/13/19  1:55 AM   Result Value Ref Range    Special Requests: NO SPECIAL REQUESTS      Culture result: NO GROWTH AFTER 6 HOURS     POC LACTIC ACID    Collection Time: 02/13/19  2:04 AM   Result Value Ref Range    Lactic Acid (POC) 1.53 0.40 - 2.00 mmol/L   CBC WITH AUTOMATED DIFF    Collection Time: 02/13/19  2:25 AM   Result Value Ref Range    WBC 12.0 4.6 - 13.2 K/uL    RBC 3.55 (L) 4.70 - 5.50 M/uL    HGB 9.6 (L) 13.0 - 16.0 g/dL    HCT 30.2 (L) 36.0 - 48.0 %    MCV 85.1 74.0 - 97.0 FL    MCH 27.0 24.0 - 34.0 PG    MCHC 31.8 31.0 - 37.0 g/dL    RDW 14.0 11.6 - 14.5 %    PLATELET 184 457 - 662 K/uL    MPV 9.8 9.2 - 11.8 FL    NEUTROPHILS 69 40 - 73 %    LYMPHOCYTES 15 (L) 21 - 52 %    MONOCYTES 8 3 - 10 %    EOSINOPHILS 6 (H) 0 - 5 %    BASOPHILS 2 0 - 2 %    ABS. NEUTROPHILS 8.2 (H) 1.8 - 8.0 K/UL    ABS. LYMPHOCYTES 1.8 0.9 - 3.6 K/UL    ABS. MONOCYTES 1.0 0.05 - 1.2 K/UL    ABS. EOSINOPHILS 0.7 (H) 0.0 - 0.4 K/UL    ABS.  BASOPHILS 0.2 (H) 0.0 - 0.1 K/UL    DF AUTOMATED     METABOLIC PANEL, COMPREHENSIVE    Collection Time: 02/13/19  2:25 AM   Result Value Ref Range    Sodium 139 136 - 145 mmol/L    Potassium 3.8 3.5 - 5.5 mmol/L    Chloride 104 100 - 108 mmol/L    CO2 27 21 - 32 mmol/L    Anion gap 8 3.0 - 18 mmol/L    Glucose 101 (H) 74 - 99 mg/dL    BUN 19 (H) 7.0 - 18 MG/DL    Creatinine 1.20 0.6 - 1.3 MG/DL    BUN/Creatinine ratio 16 12 - 20      GFR est AA >60 >60 ml/min/1.73m2    GFR est non-AA >60 >60 ml/min/1.73m2    Calcium 9.5 8.5 - 10.1 MG/DL    Bilirubin, total 0.2 0.2 - 1.0 MG/DL    ALT (SGPT) 15 (L) 16 - 61 U/L    AST (SGOT) 17 15 - 37 U/L    Alk. phosphatase 160 (H) 45 - 117 U/L    Protein, total 7.3 6.4 - 8.2 g/dL    Albumin 3.9 3.4 - 5.0 g/dL    Globulin 3.4 2.0 - 4.0 g/dL    A-G Ratio 1.1 0.8 - 1.7     ETHYL ALCOHOL    Collection Time: 02/13/19  2:25 AM   Result Value Ref Range    ALCOHOL(ETHYL),SERUM <3 0 - 3 MG/DL   CULTURE, BLOOD    Collection Time: 02/13/19  2:25 AM   Result Value Ref Range    Special Requests: NO SPECIAL REQUESTS      Culture result: NO GROWTH AFTER 6 HOURS         Impression:     Wound dehiscence left foot with cellulitis  Non compliance   Active Problems:    Mood disorder ([facility] [address] (2/13/2019)       Suicidal intent (2/13/2019)       PAD (peripheral artery disease) ([facility] [address] (1/2/2019)       Toe amputation status, left ([facility] [address] (5/18/2018)       HTN (hypertension) (2/13/2019)        Recommendation:     Discussed with the patient all the above finding. Agree with admission for IVabx and LWC. Pending patient's progress with the IVabx and LWC, will hold for now on any further surgical intervention given his history of non compliance. He has already had two TMA procedures first on March of 2018 at [facility] and then again 1/7/19 here at [facility] a total revision of the TMA with flap closure. Both times, he chose to be completely non compliant and full wt bearing post - op on the surgical foot despite being given crutches and PT gait training. Will start him on [address] - flush with Dermal Wound Cleanser and then apply Medihoney paste with DSD daily    I would like to thank Yvon Birch along with nursing staff for assistance in the team approach and care for the patient.     Signed By: Dr Diane Gamez and Ankle  C) [phone]    February 13, 2019

## 2019-02-13 NOTE — H&P
Internal Medicine History and Physical 
   
 
 
Subjective HPI: Justina Parmar is a homeless 63 y.o. male with a PMHx of HTN, depression, PVD who presented to the ED with c/o left foot pain and infection. Pt had a L TMA in January and reports pain increased significantly yesterday after he walked around a lot to appointments. Patient also reports suicidal ideation. Psych consulted in ED and recs Abilify 15mg po daily and admission to inpatient psych once medically cleared. UDS +THC. Podiatry consulted as well - recs admission for IV abx. XR neg for OM. Pt will be admitted for further evaluation. PMHx: 
Past Medical History:  
Diagnosis Date  Calculus of kidney  Depression  Hypertension  Sleep disorder  Vascular disorder of lower extremity   
 bilateral  
 
 
PSurgHx: 
Past Surgical History:  
Procedure Laterality Date  HX AMPUTATION TOE Left   
 x 5 toes  HX HEENT  VASCULAR SURGERY PROCEDURE UNLIST SocialHx: 
Social History Socioeconomic History  Marital status:  Spouse name: Not on file  Number of children: Not on file  Years of education: Not on file  Highest education level: Not on file Social Needs  Financial resource strain: Not on file  Food insecurity - worry: Not on file  Food insecurity - inability: Not on file  Transportation needs - medical: Not on file  Transportation needs - non-medical: Not on file Occupational History  Not on file Tobacco Use  Smoking status: Current Every Day Smoker Packs/day: 0.25  Smokeless tobacco: Never Used Substance and Sexual Activity  Alcohol use: Yes  Drug use: Yes Frequency: 1.0 times per week Types: Marijuana Comment: rarely  Sexual activity: Not Currently Other Topics Concern  Not on file Social History Narrative ** Merged History Encounter ** FamilyHx: 
Family History Problem Relation Age of Onset [facility] Arthritis-osteo Mother  Diabetes Father  Heart Disease Father  Melanoma Father  Psychiatric Disorder Brother Prior to Admission Medications Prescriptions Last Dose Informant Patient Reported? Taking?  
aspirin (ASPIRIN) 325 mg tablet 2019 at Unknown time  Yes Yes Si mg.  
atorvastatin (LIPITOR) 40 mg tablet 2019 at Unknown time  Yes Yes Si mg.  
clopidogrel (PLAVIX) 75 mg tab 2019 at Unknown time  No Yes Sig: Take 1 Tab by mouth daily. gabapentin (NEURONTIN) 100 mg capsule 2019 at Unknown time  No Yes Sig: Take 1 Cap by mouth three (3) times daily. Patient taking differently: Take 1,200 mg by mouth three (3) times daily. metoprolol succinate (TOPROL-XL) 25 mg XL tablet 2019 at Unknown time  No Yes Sig: Take 1 Tab by mouth daily. nicotine (NICODERM CQ) 21 mg/24 hr Not Taking at Unknown time  Yes No  
Si Patch. oxyCODONE IR (ROXICODONE) 5 mg immediate release tablet Not Taking at Unknown time  No No  
Sig: Take 1-2 Tabs by mouth every four (4) hours as needed. Max Daily Amount: 60 mg.  
pantoprazole (PROTONIX) 40 mg tablet 2019 at Unknown time  No Yes Sig: Take 1 Tab by mouth daily. therapeutic multivitamin-minerals (THERAGRAN-M) tablet 2019 at Unknown time  Yes Yes Sig: Take 1 Tab by Mouth Once a Day. venlafaxine-SR (EFFEXOR-XR) 150 mg capsule 2019 at Unknown time  No Yes Sig: Take 1 Cap by mouth daily. Indications: ANXIETY WITH DEPRESSION Patient taking differently: Take 150 mg by mouth two (2) times a day. Facility-Administered Medications: None Review of Systems: 
CONST: Fever, weight loss, fatigue or chills HEENT: Recent changes in vision, vertigo, epistaxis, dysphagia and hoarseness CV: Chest pain, palpitations, HTN, edema and varicosities RESP: Cough, shortness of breath, wheezing, hemoptysis, snoring and reactive airway disease GI: Nausea, vomiting, abdominal pain, change in bowel habits, hematochezia, melena, and GERD  
: Hematuria, dysuria, frequency, urgency, nocturia and stress urinary incontinence MS: L foot pain, Weakness, joint pain and arthritis ENDO: Diabetes, thyroid disease, polyuria, polydipsia, polyphagia, poor wound healing, heat intolerance, cold intolerance LYMPH/HEME: Anemia, bruising and history of blood transfusions INTEG: Dermatitis, abnormal moles NEURO: Dizziness, headache, fainting, seizures and stroke PSYCH: Anxiety and depression, Suicidal ideation Objective Visit Vitals /63 (BP 1 Location: Right arm, BP Patient Position: At rest) Pulse 64 Temp 98 °F (36.7 °C) Resp 20 Ht 6' 2\" (1.88 m) Wt 74.8 kg (165 lb) SpO2 98% BMI 21.18 kg/m² Physical Exam: 
General Appearance: NAD, conversant HENT: normocephalic/atraumatic, moist mucus membranes Lungs: CTA with normal respiratory effort Cardiovascular: RRR, no m/r/g, capillary refill < 2 sec, B/L DP/PT pulses +3/4 Abdomen: soft, non-tender, normal bowel sounds Extremities: no cyanosis, no peripheral edema, left foot s/p TMA with malodorous wound dehiscence Neuro: moves all extremities, no focal deficits Psych: manic Laboratory Studies: 
BMP:  
Lab Results Component Value Date/Time  02/13/2019 02:25 AM  
 K 3.8 02/13/2019 02:25 AM  
  02/13/2019 02:25 AM  
 CO2 27 02/13/2019 02:25 AM  
 AGAP 8 02/13/2019 02:25 AM  
  (H) 02/13/2019 02:25 AM  
 BUN 19 (H) 02/13/2019 02:25 AM  
 CREA 1.20 02/13/2019 02:25 AM  
 GFRAA >60 02/13/2019 02:25 AM  
 GFRNA >60 02/13/2019 02:25 AM  
 
CBC:  
Lab Results Component Value Date/Time WBC 12.0 02/13/2019 02:25 AM  
 HGB 9.6 (L) 02/13/2019 02:25 AM  
 HCT 30.2 (L) 02/13/2019 02:25 AM  
  02/13/2019 02:25 AM  
 
 
Imaging Reviewed: 
Xr Foot Lt Min 3 V Result Date: 2/13/2019 EXAM: Left foot 3 views INDICATION: Left foot pain COMPARISON: Several prior exams, most recently 1/4/2019. _______________ FINDINGS: AP, oblique, lateral views of the left foot are performed and interpreted with comparison as above. Postoperative changes of interval transmetatarsal indication noted no discrete osseous erosions. Indolent appearing periosteal reaction noted in keeping with recent prior surgery. Multiple surgical clips noted along the medial aspect of the distal left leg. No retained radiopaque foreign object. No fracture. Several ossicles are noted within the peroneal tendon sheath. _______________ IMPRESSION: 1. Progressive and interval transmetatarsal amputation of the left foot without superimposed acute radiographic abnormality. Assessment/Plan Principal Problem: Wound dehiscence (2/13/2019) Active Problems: 
  Mood disorder ([facility] (2/13/2019) Suicidal intent (2/13/2019) PAD (peripheral artery disease) ([facility] [facility] (1/2/2019) Toe amputation status, left ([facility] [address] (5/18/2018) HTN (hypertension) (2/13/2019) Wound dehiscence s/p L TMA 
- iv abx - podiatry consult 
- PRN pain medications Mood disorder 
- abilify 15mg 
- cont Effexor Suicidal Ideation - psych consulted 
- inpatient psych when medically cleared 
- sitter HTN 
- cont home meds 
- monitor BP 
 
- Cont acceptable home medications for chronic conditions  
- DVT protocol I have personally reviewed all pertinent labs, films and EKGs that have officially resulted. I reviewed available electronic documentation outlining the initial presentation as well as the emergency room physician's encounter. Rock Alvares PA-C [address] [place], [place] [address] Office:  [phone] Pager: [phone]

## 2019-02-13 NOTE — ED TRIAGE NOTES
Patient reports SI without plan x 1 week. Denies HI. Denies A/V hallucinations. Reports marijuana use earlier tonight.

## 2019-02-13 NOTE — CONSULTS
PT is a 57y/o dm with h/o mood d/o who came in with c/o feeling hopeless, depressed and suicidal.  Pt has attempted suicide before. Pt c/o poor sleep, increased energy , racing thoughts and chronic pain. She denied h/o trauma but recently had a partial amputation of his foot. He says he is supposed to stay off it but had to walk to medical appointments and now has severe pain. He denied perceptual disturbances or feeling paranoid. He only admits to occasional alcohol and marijuana. Pt reported being homeless, stating he is no allowed to stay at his [facility]. He says his brother was an Olympian but committed suicide. PT does not have an outpatient provider. Past psych:  Pt was last seen 1/19. He has a h/o mood d/o and has attempted suicide twice by cutting and hanging. He admits to h/o using cocaine, mushrooms and heroin. He only admits to marijuana and occasional alcohol now. He did not answer if he has been to rehab or detox. He does not have an outpatient provider. PMH:   Peripheral arterial disease, status post right lower extremity stent, left lower extremity fem-pop bypass graft, history of nephrolithiasis, history of depression, history of hypertension, and hyperlipidemia    Meds;     aspirin 325 mg tablet  Commonly known as:  ASPIRIN      atorvastatin 40 mg tablet  Commonly known as:  LIPITOR      clopidogrel 75 mg Tab  Commonly known as:  PLAVIX  Take 1 Tab by mouth daily.      gabapentin 100 mg capsule  Commonly known as:  NEURONTIN  Take 1 Cap by mouth three (3) times daily.      metoprolol succinate 25 mg XL tablet  Commonly known as:  TOPROL-XL  Take 1 Tab by mouth daily.      nicotine 21 mg/24 hr  Commonly known as:  NICODERM CQ      oxyCODONE IR 5 mg immediate release tablet  Commonly known as:  ROXICODONE  Take 1-2 Tabs by mouth every four (4) hours as needed.  Max Daily Amount: 60 mg.      pantoprazole 40 mg tablet  Commonly known as:  PROTONIX  Take 1 Tab by mouth daily.      therapeutic multivitamin-minerals tablet  Commonly known as:  THERAGRAN-M      venlafaxine- mg capsule  Commonly known as:  EFFEXOR-XR  Take 1 Cap by mouth daily. Indications: ANXIETY WITH DEPRESSION     Allergies:  NKDA    FH:  Pt says his brother had bipolar and committed suicide by getting in the tub with a toaster. He says his brother used drugs and his father was an alcoholic. SH:  Pt reported losing \"everything\" and is now homeless,  and has minimal supports. He says he has a girlfriend he met online last week but has not seen her in person yet. PT denied h/o abuse. 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 He denied being on disability or  experience. He denied having access to guns. He says he went to jail for 3 mo for \"giving false information. \"      MSE:  Pt was unkempt with fair eye contact. His speech was mildly pressured  He endorsed feeling hopeless and suicidal  Thought process was tangential, disorganized and grandiose. Insight and judgement were poor. DX:  Unspecified mood d/o  Suicidal  Alcohol use d/o  Cannabis use d/o    A/P  Pt is a 57y/o dm with h/o mood d/o who came in with c/o feeling hopeless and suicidal.  Pt c/o inability to sleep, racing thoughts, increased energy an suicidal thoughts. He appeared manic as he was rather flirtatious, grandiose with a tangential, disorganized thought process. He endorsed being homeless and having lost everything  He admits to use of alcohol occasionally and marijuana. UDS shows cannabis. He has a h/o using cocaine, mushrooms and heroin. He recently had a partial amputation of his foot and c/o pain worsened by walking. Pt endorsed family history of affective do, substance abuse and completed suicide. Given current suicidal thoughts, h/o attempts, substance use and family hx of completed suicide, recommend admit for safety.       1.  PT does present an imminent danger to self via poor self care, suicidal thoughts, h/o attempts and family hx of completed suicide. He would meet commitment criteria. Recommend admit to inpatient psych. Once medically cleared. 2.  Provide safety precautions. 3.  Abilify 15mg po every day for s/o zulma/mood disorder.

## 2019-02-13 NOTE — ED NOTES
PATIENT BELONGINGS: 
 
3 bags locker #3, 1 suitcase and 1 wooden fishing pole in radio closet No belongings sent with security No meds sent to pharmacy

## 2019-02-13 NOTE — ED PROVIDER NOTES
Viky Medina is a 63 y.o. Male who is s/p partial left foot amputation in jan, who was found outside of hospital with c/o diff walking and increased pain to left foot from walking for doctors appts. Also has felt suicidal for last 2 days feeling like he has nothing left to live for being homeless. Admitted in dec for same. C/o increased drainage to left foot, redness. No fcs, nvd. The history is provided by the patient. Past Medical History:  
Diagnosis Date  Calculus of kidney  Depression  Hypertension  Sleep disorder  Vascular disorder of lower extremity   
 bilateral  
 
 
Past Surgical History:  
Procedure Laterality Date  HX AMPUTATION TOE Left   
 x 5 toes  HX HEENT  VASCULAR SURGERY PROCEDURE UNLIST Family History:  
Problem Relation Age of Onset Mikala Clinton Arthritis-osteo Mother  Diabetes Father  Heart Disease Father  Melanoma Father  Psychiatric Disorder Brother Social History Socioeconomic History  Marital status:  Spouse name: Not on file  Number of children: Not on file  Years of education: Not on file  Highest education level: Not on file Social Needs  Financial resource strain: Not on file  Food insecurity - worry: Not on file  Food insecurity - inability: Not on file  Transportation needs - medical: Not on file  Transportation needs - non-medical: Not on file Occupational History  Not on file Tobacco Use  Smoking status: Current Every Day Smoker Packs/day: 0.25  Smokeless tobacco: Never Used Substance and Sexual Activity  Alcohol use: Yes  Drug use: Yes Frequency: 1.0 times per week Types: Marijuana Comment: rarely  Sexual activity: Not Currently Other Topics Concern  Not on file Social History Narrative ** Merged History Encounter ** ALLERGIES: Patient has no known allergies. Review of Systems Constitutional: Negative for fever. HENT: Negative for sore throat. Eyes: Negative for visual disturbance. Respiratory: Negative for shortness of breath. Cardiovascular: Positive for leg swelling. Negative for chest pain. Gastrointestinal: Negative for abdominal pain. Endocrine: Negative for polyuria. Genitourinary: Negative for difficulty urinating and dysuria. Musculoskeletal: Positive for arthralgias and gait problem. Skin: Positive for color change, rash and wound. Allergic/Immunologic: Negative for immunocompromised state. Neurological: Negative for syncope. Psychiatric/Behavioral: Positive for sleep disturbance and suicidal ideas. Vitals:  
 02/13/19 0120 BP: (!) 160/101 Pulse: 93 Resp: 16 Temp: 97.8 °F (36.6 °C) SpO2: 98% Weight: 74.8 kg (165 lb) Height: 6' 2\" (1.88 m) Physical Exam  
Constitutional: He is oriented to person, place, and time. Non-toxic appearance. He does not appear ill. No distress. HENT:  
Head: Normocephalic and atraumatic. Right Ear: External ear normal.  
Left Ear: External ear normal.  
Nose: Nose normal.  
Mouth/Throat: Oropharynx is clear and moist. No oropharyngeal exudate. Eyes: Conjunctivae are normal.  
Neck: Normal range of motion. Cardiovascular: Normal rate, regular rhythm, normal heart sounds and intact distal pulses. Pulmonary/Chest: Effort normal and breath sounds normal. No respiratory distress. Abdominal: Soft. There is no tenderness. Musculoskeletal: Normal range of motion. He exhibits no edema. Feet: 
 
Neurological: He is alert and oriented to person, place, and time. Skin: Skin is warm and dry. He is not diaphoretic. Psychiatric: His behavior is normal. His affect is angry. He is not actively hallucinating. He expresses suicidal ideation. He expresses suicidal plans. He is attentive. Nursing note and vitals reviewed. MDM Procedures Vitals: Patient Vitals for the past 12 hrs: 
 Temp Pulse Resp BP SpO2  
02/13/19 0120 97.8 °F (36.6 °C) 93 16 (!) 160/101 98 % Medications ordered:  
Medications  
piperacillin-tazobactam (ZOSYN) 4.5 g in 0.9% sodium chloride (MBP/ADV) 100 mL MBP (4.5 g IntraVENous New Bag 2/13/19 0250) metoprolol succinate (TOPROL-XL) XL tablet 25 mg (not administered)  
pantoprazole (PROTONIX) tablet 40 mg (not administered) clopidogrel (PLAVIX) tablet 75 mg (not administered)  
atorvastatin (LIPITOR) tablet 40 mg (not administered)  
nicotine (NICODERM CQ) 21 mg/24 hr patch 1 Patch (1 Patch TransDERmal Apply Patch 2/13/19 0305) HYDROcodone-acetaminophen (NORCO) 5-325 mg per tablet 1 Tab (not administered)  
piperacillin-tazobactam (ZOSYN) 4.5 gram injection (  Canceled Entry 2/13/19 0246)  
0.9% sodium chloride (MBP/ADV) infusion (  Canceled Entry 2/13/19 0247) vancomycin (VANCOCIN) 2000 mg in  ml infusion (not administered) Lab findings: 
Recent Results (from the past 12 hour(s)) DRUG SCREEN, URINE Collection Time: 02/13/19  1:45 AM  
Result Value Ref Range BENZODIAZEPINES NEGATIVE  NEG    
 BARBITURATES NEGATIVE  NEG    
 THC (TH-CANNABINOL) POSITIVE (A) NEG    
 OPIATES NEGATIVE  NEG    
 PCP(PHENCYCLIDINE) NEGATIVE  NEG    
 COCAINE NEGATIVE  NEG    
 AMPHETAMINES NEGATIVE  NEG METHADONE NEGATIVE  NEG HDSCOM (NOTE) POC LACTIC ACID Collection Time: 02/13/19  2:04 AM  
Result Value Ref Range Lactic Acid (POC) 1.53 0.40 - 2.00 mmol/L  
CBC WITH AUTOMATED DIFF Collection Time: 02/13/19  2:25 AM  
Result Value Ref Range WBC 12.0 4.6 - 13.2 K/uL  
 RBC 3.55 (L) 4.70 - 5.50 M/uL HGB 9.6 (L) 13.0 - 16.0 g/dL HCT 30.2 (L) 36.0 - 48.0 % MCV 85.1 74.0 - 97.0 FL  
 MCH 27.0 24.0 - 34.0 PG  
 MCHC 31.8 31.0 - 37.0 g/dL  
 RDW 14.0 11.6 - 14.5 % PLATELET 172 074 - 388 K/uL MPV 9.8 9.2 - 11.8 FL  
 NEUTROPHILS 69 40 - 73 % LYMPHOCYTES 15 (L) 21 - 52 % MONOCYTES 8 3 - 10 % EOSINOPHILS 6 (H) 0 - 5 % BASOPHILS 2 0 - 2 %  
 ABS. NEUTROPHILS 8.2 (H) 1.8 - 8.0 K/UL  
 ABS. LYMPHOCYTES 1.8 0.9 - 3.6 K/UL  
 ABS. MONOCYTES 1.0 0.05 - 1.2 K/UL  
 ABS. EOSINOPHILS 0.7 (H) 0.0 - 0.4 K/UL  
 ABS. BASOPHILS 0.2 (H) 0.0 - 0.1 K/UL  
 DF AUTOMATED METABOLIC PANEL, COMPREHENSIVE Collection Time: 02/13/19  2:25 AM  
Result Value Ref Range Sodium 139 136 - 145 mmol/L Potassium 3.8 3.5 - 5.5 mmol/L Chloride 104 100 - 108 mmol/L  
 CO2 27 21 - 32 mmol/L Anion gap 8 3.0 - 18 mmol/L Glucose 101 (H) 74 - 99 mg/dL BUN 19 (H) 7.0 - 18 MG/DL Creatinine 1.20 0.6 - 1.3 MG/DL  
 BUN/Creatinine ratio 16 12 - 20 GFR est AA >60 >60 ml/min/1.73m2 GFR est non-AA >60 >60 ml/min/1.73m2 Calcium 9.5 8.5 - 10.1 MG/DL Bilirubin, total 0.2 0.2 - 1.0 MG/DL  
 ALT (SGPT) 15 (L) 16 - 61 U/L  
 AST (SGOT) 17 15 - 37 U/L Alk. phosphatase 160 (H) 45 - 117 U/L Protein, total 7.3 6.4 - 8.2 g/dL Albumin 3.9 3.4 - 5.0 g/dL Globulin 3.4 2.0 - 4.0 g/dL A-G Ratio 1.1 0.8 - 1.7 ETHYL ALCOHOL Collection Time: 02/13/19  2:25 AM  
Result Value Ref Range ALCOHOL(ETHYL),SERUM <3 0 - 3 MG/DL  
 
 
EKG interpretation by ED Physician: X-Ray, CT or other radiology findings or impressions: 
XR FOOT LT MIN 3 V    (Results Pending) post op changes. No gas. Obvious osteo Progress notes, Consult notes or additional Procedure notes: Will place on abx pending podiatry eval of wound, possibly may req readmission for medical/surgical treatment Will t/o dr Kulwidner Samuel to f/u on consult Reevaluation of patient:  
stable Disposition: 
Diagnosis: 1. Suicidal ideations 2. Infection of deep incisional surgical site after procedure, initial encounter Disposition: pending Follow-up Information None Medication List  
  
ASK your doctor about these medications   
aspirin 325 mg tablet Commonly known as:  ASPIRIN 
  
 atorvastatin 40 mg tablet Commonly known as:  LIPITOR 
  
clopidogrel 75 mg Tab Commonly known as:  PLAVIX Take 1 Tab by mouth daily. gabapentin 100 mg capsule Commonly known as:  NEURONTIN Take 1 Cap by mouth three (3) times daily. metoprolol succinate 25 mg XL tablet Commonly known as:  TOPROL-XL Take 1 Tab by mouth daily. nicotine 21 mg/24 hr 
Commonly known as:  NICODERM CQ 
  
oxyCODONE IR 5 mg immediate release tablet Commonly known as:  [place] Take 1-2 Tabs by mouth every four (4) hours as needed. Max Daily Amount: 60 mg. 
  
pantoprazole 40 mg tablet Commonly known as:  PROTONIX Take 1 Tab by mouth daily. therapeutic multivitamin-minerals tablet Commonly known as:  THERAGRAN-M 
  
venlafaxine- mg capsule Commonly known as:  EFFEXOR-XR Take 1 Cap by mouth daily.  Indications: ANXIETY WITH DEPRESSION

## 2019-02-13 NOTE — ED NOTES
Pt turnover given to Bret Tolbert, in room 2107. Sitter at bedside with pt, belongings secured/removed from pt room.

## 2019-02-14 ENCOUNTER — HOSPITAL ENCOUNTER (INPATIENT)
Age: 59
LOS: 1 days | Discharge: LEFT BEFORE DISCHARGE | DRG: 751 | End: 2019-02-15
Attending: PSYCHIATRY & NEUROLOGY | Admitting: PSYCHIATRY & NEUROLOGY
Payer: MEDICAID

## 2019-02-14 VITALS
BODY MASS INDEX: 21.17 KG/M2 | TEMPERATURE: 97.7 F | HEIGHT: 74 IN | SYSTOLIC BLOOD PRESSURE: 149 MMHG | RESPIRATION RATE: 18 BRPM | HEART RATE: 64 BPM | DIASTOLIC BLOOD PRESSURE: 76 MMHG | OXYGEN SATURATION: 96 % | WEIGHT: 165 LBS

## 2019-02-14 LAB
ANION GAP SERPL CALC-SCNC: 6 MMOL/L (ref 3–18)
BUN SERPL-MCNC: 11 MG/DL (ref 7–18)
BUN/CREAT SERPL: 11 (ref 12–20)
CALCIUM SERPL-MCNC: 8.5 MG/DL (ref 8.5–10.1)
CHLORIDE SERPL-SCNC: 108 MMOL/L (ref 100–108)
CO2 SERPL-SCNC: 28 MMOL/L (ref 21–32)
CREAT SERPL-MCNC: 1.02 MG/DL (ref 0.6–1.3)
DATE LAST DOSE: 0
ERYTHROCYTE [DISTWIDTH] IN BLOOD BY AUTOMATED COUNT: 14.4 % (ref 11.6–14.5)
GLUCOSE SERPL-MCNC: 100 MG/DL (ref 74–99)
HCT VFR BLD AUTO: 32.7 % (ref 36–48)
HGB BLD-MCNC: 10 G/DL (ref 13–16)
MCH RBC QN AUTO: 26.5 PG (ref 24–34)
MCHC RBC AUTO-ENTMCNC: 30.6 G/DL (ref 31–37)
MCV RBC AUTO: 86.7 FL (ref 74–97)
PLATELET # BLD AUTO: 388 K/UL (ref 135–420)
PMV BLD AUTO: 9.6 FL (ref 9.2–11.8)
POTASSIUM SERPL-SCNC: 4 MMOL/L (ref 3.5–5.5)
RBC # BLD AUTO: 3.77 M/UL (ref 4.7–5.5)
REPORTED DOSE,DOSE: 0 UNITS
REPORTED DOSE/TIME,TMG: 0
SODIUM SERPL-SCNC: 142 MMOL/L (ref 136–145)
VANCOMYCIN TROUGH SERPL-MCNC: 17.8 UG/ML (ref 10–20)
WBC # BLD AUTO: 8.2 K/UL (ref 4.6–13.2)

## 2019-02-14 PROCEDURE — 97165 OT EVAL LOW COMPLEX 30 MIN: CPT

## 2019-02-14 PROCEDURE — 74011250637 HC RX REV CODE- 250/637: Performed by: HOSPITALIST

## 2019-02-14 PROCEDURE — 36415 COLL VENOUS BLD VENIPUNCTURE: CPT

## 2019-02-14 PROCEDURE — 99218 HC RM OBSERVATION: CPT

## 2019-02-14 PROCEDURE — 80048 BASIC METABOLIC PNL TOTAL CA: CPT

## 2019-02-14 PROCEDURE — 74011250636 HC RX REV CODE- 250/636: Performed by: EMERGENCY MEDICINE

## 2019-02-14 PROCEDURE — 96361 HYDRATE IV INFUSION ADD-ON: CPT

## 2019-02-14 PROCEDURE — 85027 COMPLETE CBC AUTOMATED: CPT

## 2019-02-14 PROCEDURE — 74011000258 HC RX REV CODE- 258: Performed by: HOSPITALIST

## 2019-02-14 PROCEDURE — 80202 ASSAY OF VANCOMYCIN: CPT

## 2019-02-14 PROCEDURE — 74011000250 HC RX REV CODE- 250: Performed by: EMERGENCY MEDICINE

## 2019-02-14 PROCEDURE — 97162 PT EVAL MOD COMPLEX 30 MIN: CPT

## 2019-02-14 PROCEDURE — 96372 THER/PROPH/DIAG INJ SC/IM: CPT

## 2019-02-14 PROCEDURE — 74011250636 HC RX REV CODE- 250/636: Performed by: HOSPITALIST

## 2019-02-14 PROCEDURE — 65220000005 HC RM SEMIPRIVATE PSYCH 3 OR 4 BED

## 2019-02-14 PROCEDURE — 74011250637 HC RX REV CODE- 250/637: Performed by: EMERGENCY MEDICINE

## 2019-02-14 RX ORDER — METOPROLOL SUCCINATE 50 MG/1
50 TABLET, EXTENDED RELEASE ORAL DAILY
Status: DISCONTINUED | OUTPATIENT
Start: 2019-02-14 | End: 2019-02-14 | Stop reason: HOSPADM

## 2019-02-14 RX ORDER — CLOPIDOGREL BISULFATE 75 MG/1
75 TABLET ORAL DAILY
Status: DISCONTINUED | OUTPATIENT
Start: 2019-02-15 | End: 2019-02-15 | Stop reason: HOSPADM

## 2019-02-14 RX ORDER — SULFAMETHOXAZOLE AND TRIMETHOPRIM 800; 160 MG/1; MG/1
1 TABLET ORAL 2 TIMES DAILY
Qty: 20 TAB | Refills: 0 | Status: SHIPPED | OUTPATIENT
Start: 2019-02-14 | End: 2019-02-15

## 2019-02-14 RX ORDER — CEPHALEXIN 250 MG/1
500 CAPSULE ORAL EVERY 6 HOURS
Status: DISCONTINUED | OUTPATIENT
Start: 2019-02-15 | End: 2019-02-15 | Stop reason: HOSPADM

## 2019-02-14 RX ORDER — CEPHALEXIN 500 MG/1
500 CAPSULE ORAL 4 TIMES DAILY
Qty: 40 CAP | Refills: 0 | Status: SHIPPED | OUTPATIENT
Start: 2019-02-14 | End: 2019-02-15

## 2019-02-14 RX ORDER — GABAPENTIN 100 MG/1
100 CAPSULE ORAL 3 TIMES DAILY
Status: DISCONTINUED | OUTPATIENT
Start: 2019-02-15 | End: 2019-02-15 | Stop reason: HOSPADM

## 2019-02-14 RX ORDER — IBUPROFEN 600 MG/1
600 TABLET ORAL
Status: DISCONTINUED | OUTPATIENT
Start: 2019-02-14 | End: 2019-02-15 | Stop reason: HOSPADM

## 2019-02-14 RX ORDER — METOPROLOL SUCCINATE 25 MG/1
25 TABLET, EXTENDED RELEASE ORAL DAILY
Status: DISCONTINUED | OUTPATIENT
Start: 2019-02-15 | End: 2019-02-15 | Stop reason: HOSPADM

## 2019-02-14 RX ORDER — ONDANSETRON 4 MG/1
4 TABLET, ORALLY DISINTEGRATING ORAL
Status: DISCONTINUED | OUTPATIENT
Start: 2019-02-14 | End: 2019-02-14 | Stop reason: HOSPADM

## 2019-02-14 RX ORDER — LABETALOL HCL 20 MG/4 ML
10 SYRINGE (ML) INTRAVENOUS ONCE
Status: DISCONTINUED | OUTPATIENT
Start: 2019-02-14 | End: 2019-02-14 | Stop reason: HOSPADM

## 2019-02-14 RX ORDER — ATORVASTATIN CALCIUM 40 MG/1
40 TABLET, FILM COATED ORAL DAILY
Status: DISCONTINUED | OUTPATIENT
Start: 2019-02-15 | End: 2019-02-15 | Stop reason: HOSPADM

## 2019-02-14 RX ORDER — HYDROXYZINE PAMOATE 50 MG/1
50 CAPSULE ORAL
Status: DISCONTINUED | OUTPATIENT
Start: 2019-02-14 | End: 2019-02-15 | Stop reason: HOSPADM

## 2019-02-14 RX ORDER — ENOXAPARIN SODIUM 100 MG/ML
40 INJECTION SUBCUTANEOUS EVERY 24 HOURS
Status: DISCONTINUED | OUTPATIENT
Start: 2019-02-14 | End: 2019-02-14 | Stop reason: HOSPADM

## 2019-02-14 RX ORDER — IBUPROFEN 200 MG
1 TABLET ORAL DAILY
Status: DISCONTINUED | OUTPATIENT
Start: 2019-02-15 | End: 2019-02-15 | Stop reason: HOSPADM

## 2019-02-14 RX ORDER — SULFAMETHOXAZOLE AND TRIMETHOPRIM 800; 160 MG/1; MG/1
1 TABLET ORAL EVERY 12 HOURS
Status: DISCONTINUED | OUTPATIENT
Start: 2019-02-15 | End: 2019-02-15 | Stop reason: HOSPADM

## 2019-02-14 RX ORDER — ARIPIPRAZOLE 5 MG/1
5 TABLET ORAL DAILY
Status: DISCONTINUED | OUTPATIENT
Start: 2019-02-15 | End: 2019-02-15 | Stop reason: HOSPADM

## 2019-02-14 RX ORDER — VENLAFAXINE HYDROCHLORIDE 150 MG/1
150 CAPSULE, EXTENDED RELEASE ORAL
Status: DISCONTINUED | OUTPATIENT
Start: 2019-02-15 | End: 2019-02-15 | Stop reason: HOSPADM

## 2019-02-14 RX ORDER — METOPROLOL SUCCINATE 50 MG/1
50 TABLET, EXTENDED RELEASE ORAL DAILY
Status: DISCONTINUED | OUTPATIENT
Start: 2019-02-14 | End: 2019-02-14

## 2019-02-14 RX ORDER — PANTOPRAZOLE SODIUM 40 MG/1
40 TABLET, DELAYED RELEASE ORAL
Status: DISCONTINUED | OUTPATIENT
Start: 2019-02-15 | End: 2019-02-15 | Stop reason: HOSPADM

## 2019-02-14 RX ORDER — ARIPIPRAZOLE 15 MG/1
15 TABLET ORAL DAILY
Qty: 30 TAB | Refills: 0 | Status: ON HOLD
Start: 2019-02-14 | End: 2019-06-19 | Stop reason: SDUPTHER

## 2019-02-14 RX ORDER — LABETALOL HCL 20 MG/4 ML
SYRINGE (ML) INTRAVENOUS
Status: DISCONTINUED
Start: 2019-02-14 | End: 2019-02-14 | Stop reason: WASHOUT

## 2019-02-14 RX ADMIN — BACITRACIN 1 PACKET: 500 OINTMENT TOPICAL at 09:21

## 2019-02-14 RX ADMIN — ATORVASTATIN CALCIUM 40 MG: 40 TABLET, FILM COATED ORAL at 09:21

## 2019-02-14 RX ADMIN — SODIUM CHLORIDE 1000 MG: 900 INJECTION, SOLUTION INTRAVENOUS at 16:12

## 2019-02-14 RX ADMIN — PIPERACILLIN SODIUM,TAZOBACTAM SODIUM 3.38 G: 3; .375 INJECTION, POWDER, FOR SOLUTION INTRAVENOUS at 06:18

## 2019-02-14 RX ADMIN — GABAPENTIN 100 MG: 100 CAPSULE ORAL at 16:20

## 2019-02-14 RX ADMIN — HYDROCODONE BITARTRATE AND ACETAMINOPHEN 1 TABLET: 5; 325 TABLET ORAL at 20:00

## 2019-02-14 RX ADMIN — BACITRACIN 1 PACKET: 500 OINTMENT TOPICAL at 18:13

## 2019-02-14 RX ADMIN — GABAPENTIN 100 MG: 100 CAPSULE ORAL at 09:20

## 2019-02-14 RX ADMIN — HYDROCODONE BITARTRATE AND ACETAMINOPHEN 1 TABLET: 5; 325 TABLET ORAL at 08:01

## 2019-02-14 RX ADMIN — ENOXAPARIN SODIUM 40 MG: 40 INJECTION, SOLUTION INTRAVENOUS; SUBCUTANEOUS at 06:18

## 2019-02-14 RX ADMIN — CLOPIDOGREL BISULFATE 75 MG: 75 TABLET, FILM COATED ORAL at 09:20

## 2019-02-14 RX ADMIN — HYDROCODONE BITARTRATE AND ACETAMINOPHEN 1 TABLET: 5; 325 TABLET ORAL at 04:16

## 2019-02-14 RX ADMIN — HYDROCODONE BITARTRATE AND ACETAMINOPHEN 1 TABLET: 5; 325 TABLET ORAL at 16:28

## 2019-02-14 RX ADMIN — HYDROCODONE BITARTRATE AND ACETAMINOPHEN 1 TABLET: 5; 325 TABLET ORAL at 12:43

## 2019-02-14 RX ADMIN — PANTOPRAZOLE SODIUM 40 MG: 40 TABLET, DELAYED RELEASE ORAL at 08:01

## 2019-02-14 RX ADMIN — METOPROLOL SUCCINATE 25 MG: 50 TABLET, EXTENDED RELEASE ORAL at 09:16

## 2019-02-14 RX ADMIN — SODIUM CHLORIDE 1000 MG: 900 INJECTION, SOLUTION INTRAVENOUS at 03:46

## 2019-02-14 NOTE — PROGRESS NOTES
Informed by pt's , Mireya Pereira that pt is now medically cleared for psych. Call placed to [facility] and spoke with Dominga Spangler, . Nicolas Smith is the crisis worker today, who is in a meeting until around [place] [address]. Provided Dominga Spangler with pt's name and MRN requesting review by crisis worker. Awaiting a return call. Bruno Streeter RN, BSN, ACM Outcomes Manager 
[phone] (phone)

## 2019-02-14 NOTE — PROGRESS NOTES
Assumed patient care. Received patient awake, alert, oriented x4. Patient complained of pain left foot 10/10. Med. To be given. Bed is locked and in lowest position and call bell is within reach. Not in acute distress.

## 2019-02-14 NOTE — PROGRESS NOTES
Problem: Mobility Impaired (Adult and Pediatric) Goal: *Acute Goals and Plan of Care (Insert Text) Outcome: Resolved/Met Date Met: 02/14/19 PHYSICAL THERAPY: Initial Assessment/Discharge INPATIENT: Medicaid: Hospital Day: 2 Patient: Jarrod Chavez (83 y.o. male)    Date: 2/14/2019 Primary Diagnosis: Wound dehiscence [T81.30XA] Precautions: Fall PLOF: Mod I/Independent ASSESSMENT AND RECOMMENDATIONS: 
Based on the objective data described below, the patient presents with mobility status appropriate for discharge to home. Encountered patient in bed with LLE undressed/wound exposed to air, verbalizes non-compliance with LLE NWB precautions. Educated on keeping LLE wound covered, NWB LLE, and mobility with AD; refuses all education. Mod I for supine to sit. Mod I for sit to stand. Amb 40ft with independence. Refuses use of AD, compliance with NWB LLE, or education on role of precautions in healing and mobility modifications for compliance with podiatry recommendations. Returned to supine in bed with mod I. Sitter present. No further skilled needs as patient refusing education and is independent with non-compliant mobility. Skilled physical therapy is not indicated at this time. EDUCATION:  
Education:  Patient was educated on the following topics: Bed mobility, transfers, ADLs, balance, amb, safety, exercise, role of PT, plan of care, cognition, skin integrity, vitals Barriers to Learning/Limitations: yes;  cognitive Compensate with: visual, verbal, tactile, kinesthetic cues/mode Discharge Recommendations: None Further Equipment Recommendations for Discharge: crutches and rolling walker; refuses SUBJECTIVE:  
Patient stated It's too hard when you're in the mission.  OBJECTIVE DATA SUMMARY:  
 
Past Medical History:  
Diagnosis Date  Calculus of kidney  Depression  Hypertension  Sleep disorder  Vascular disorder of lower extremity   
 bilateral  
 
 Past Surgical History:  
Procedure Laterality Date  HX AMPUTATION TOE Left   
 x 5 toes  HX HEENT  VASCULAR SURGERY PROCEDURE UNLIST Eval Complexity: History: MEDIUM  Complexity : 1-2 comorbidities / personal factors will impact the outcome/ POC Exam:MEDIUM Complexity : 3 Standardized tests and measures addressing body structure, function, activity limitation and / or participation in recreation  Presentation: MEDIUM Complexity : Evolving with changing characteristics  Clinical Decision Making:Medium Complexity clinical judgement; ROM, MMT, functional mobility Overall Complexity:MEDIUM Prior Level of Function/Home Situation:  
Home Situation Home Environment: Shelter One/Two Story Residence: Other (Comment)(3 stories) Living Alone: Yes Support Systems: Friends \ neighbors Patient Expects to be Discharged to[de-identified] Shelter Current DME Used/Available at Home: NoneCritical Behavior: 
Neurologic State: Alert Orientation Level: Oriented to person;Oriented to place Cognition: Follows commands; Impulsive Safety/Judgement: Fall prevention Psychosocial 
Patient Behaviors: Talkative Manual Muscle Testing (LE) 
       R     L Hip Flexion:   5/5  5/5 Knee EXT:   5/5  5/5 Knee FLEX:   5/5  5/5 Ankle DF:   5/5  _________________________________________________ Tone : BLE normalSensation: Not tested Range Of Motion: BLE AROM [facility] Functional Mobility: 
 
 
Functional Status Indep (I) Mod I Super-vision Min A Mod A Max A Total A Assist x2 Verbal cues Additional time Not tested Comments Rolling []  []  [] []    []    []  []  [] [] [] [x] Supine to sit []  [x]  [] []  []  []  []  [] [] [] [] Sit to supine []  [x]  [] []  []  []  []  [] [] [] [] Sit to stand []  [x]  [] []  []  []  []  [] [] [] []   
Stand to sit []  [x]  [] []  []  []  []  [] [] [] [] Bed to chair transfers []  []  [] []  []  []  []  [] [] [] [x] Balance Good Omelia Beady Poor Unable Not tested Comments Sitting static [x]  []  []  []  [] Sitting dynamic [x]  []  []  []  []   
Standing static [x]  []  []  []  []   
Standing dynamic [x]  []  []  []  [] Mobility/Gait:  
Level of Assistance: Independent Assistive Device: none Distance Ambulated: 40 feet Left Lower Extremity: NWB (non-compliant) Right Lower Extremity: FWB Pain: 
Pre treatment pain: 7 Post treatment pain: 10 Pain Scale 1: Numeric (0 - 10) Activity Tolerance:   
Fair Please refer to the flowsheet for vital signs taken during this treatment. After treatment:  
[]         Patient left in no apparent distress sitting up in chair 
[x]         Patient left in no apparent distress in bed 
[x]         Call bell left within reach [x]         Nursing notified 
[]         Caregiver present 
[]         Bed alarm activated COMMUNICATION/EDUCATION:  
[x]         Fall prevention education was provided and the patient/caregiver indicated understanding. [x]         Patient/family have participated as able in goal setting and plan of care. [x]         Patient/family agree to work toward stated goals and plan of care. []         Patient understands intent and goals of therapy, but is neutral about his/her participation. []         Patient is unable to participate in goal setting and plan of care. Thank you for this referral. 
Emeka Abdalla, PT Time Calculation: 8 mins

## 2019-02-14 NOTE — PROGRESS NOTES
Problem: Self Care Deficits Care Plan (Adult) Goal: *Acute Goals and Plan of Care (Insert Text) Outcome: Resolved/Met Date Met: 02/14/19 [address] Occupational THERAPY: Eval/discharge INPATIENT: Medicaid: Hospital Day: 2 Patient: Justina Parmar (82 y.o. male)    Date: 2/14/2019 Primary Diagnosis: Wound dehiscence [T81.30XA] ,  ,  
Precautions: Falls; Suicide MD has not clarified; recommending NWB LLE 
PLOF: Pt was independent in ADLs & modified independent for functional mobility PTA. ASSESSMENT:  
Based on the objective data described below, the patient presents with modified independence/independence in bed mobility, ADLs and functional transfers/mobility. Pt supine on arrival, sitter at bedside, reporting 14/10 L foot pain. No clear weightbearing orders from MD, therefore implemented NWB orders as a precaution. Pt 100% noncompliant with NWB to LLE, partial weightbearing in heel t/o session despite no dressing & max education/encouragement. Pt maneuvered to bathroom with mod I; vc's on impulsivity and safety. Pt reports no difficulty with ADLs PTA. Pt's only concern at this time is pain. Pt again educated on important of maintaining wound & skin integrity; pt needs reinforcement. Also discussed this with PT. Skilled therapy is not indicated in this setting. Recommend HH upon d/c. Pt c/o 14/10 L foot pain; reports he just received pain meds ~20 mins ago and it \"already wore off. \" Sitter at bedside. Skilled occupational therapy is not indicated at this time. EDUCATION Education:  Patient was educated on the following topics: importance of maintaining proper weightbearing to ensure skin/wound integrity Barriers to Learning/Limitations: None Compensate with: visual, verbal, tactile, kinesthetic cues/model PLAN OF CARE:  
 
Skilled therapy not indicated at this time. Discharge Recommendations: Home Health vs. None Further Equipment Recommendations for Discharge: shower chair SUBJECTIVE:  
Patient stated: \"The only issue is the pain. I can walk fine. \" OBJECTIVE/TREATMENT:  
 
Past Medical History:  
Diagnosis Date  Calculus of kidney  Depression  Hypertension  Sleep disorder  Vascular disorder of lower extremity   
 bilateral  
 
Past Surgical History:  
Procedure Laterality Date  HX AMPUTATION TOE Left   
 x 5 toes  HX HEENT  VASCULAR SURGERY PROCEDURE UNLIST Eval Complexity: History: MEDIUM Complexity : Expanded review of history including physical, cognitive and psychosocial  history ; Examination: MEDIUM Complexity : 3-5 performance deficits relating to physical, cognitive , or psychosocial skils that result in activity limitations and / or participation restrictions; Decision Making:LOW Complexity : No comorbidities that affect functional and no verbal or physical assistance needed to complete eval tasks Prior Level of Function/Home Situation: Fully active; able to carry on all performance without restriction Restricted in physically strenuous activity but ambulatory and able to carry out work of a light or sedentary nature (e.g., light house work, office work). Lives alone 
crutches Cognitive/Behavioral Status:  
Neurologic State: alert Orientation: oriented to time, place, person and situation Cognition:  appropriate for age attention/concentration, impaired decision making and impulsive Safety/Judgement: Decreased awareness of need for safety and Decreased insight into deficits ROM: Within normal limits (BUEs) MMT: 5/5 (BUEs) Coordination: WFL (BUEs) Hand dominance: Right Skin: Intact (BUEs) Edema: None noted (BUEs) Sensation: Intact (BUEs) Vision/Perceptual: normal 
 
 
Functional Status Indep Mod I  
Sup. / 
Set- Up SBA CGA Min Assist  
Mod Assist  
Max assist  
Total Assist  
Assist x2 Additional Time NT Comments Rolling [x]  []  []  []  []    []    []    []  []  []  []  [] Supine to sit [x]  []  []  []  []  []  []  []  []  []  []  [] Sit to supine [x]  []  []  []  []  []  []  []  []  []  []  [] Sit to stand []  [x]  []  []  []  []  []  []  []  []  []  [] Toilet Transfer []  [x]  []  []  []  []  []  []  []  []  []  [] Feeding [x]  []  []  []  []  []  []  []  []  []  []  []    
Grooming [x]  []  []  []  []  []  []  []  []  []  []  [] Bathing  []  [x]  []  []  []  []  []  []  []  []  []  []    
UB Dressing  [x]  []  []  []  []  []  []  []  []  []  []  []    
LB Dressing  [x]  []  []  []  []  []  []  []  []  []  []  [] Toileting [x]  []  []  []  []  []  []  []  []  []  []  [] Balance [facility] Poor Unable Comments Sitting static [x]  []  []  [] Sitting dynamic [x]  []  []  []    
Standing static [x]  []  []  []    
Standing dynamic []  [x]  []  []  Fair+ Pain:  
Pre treatment: 14/10 Post treatment: 14/10 (pt reports he just received pain meds ~20 mins ago and it \"already wore off. \" 
Scale: numeric Activity tolerance:  fair+ and fair COMMUNICATION/EDUCATION: Pt educated on role of OT and POC; he verbalized understanding. [x]         Fall prevention education was provided and the patient/caregiver indicated understanding. [x]         Patient/family have participated as able in goal setting and plan of care. [x]         Patient/family agree to work toward stated goals and plan of care. []         Patient understands intent and goals of therapy, but is neutral about his/her participation The patients plan of care was also discussed with: Physical Therapist, Certified Occupational Therapy Assistant and Registered Nurse. · After treatment position/precautions:  
o Supine in bed 
o Bed in low position 
o Call light within reach 
o RN notified 
o Suicide precautions  
o Sitter at bedside Recommendations for nursing: up with supervision; NWB LLE Thank you for this referral. 
America Gutierrez MS OTR/L Time Calculation: 8 mins

## 2019-02-14 NOTE — PROGRESS NOTES
Per [facility]B, does not meet criteria for TDO. Rec in-pt psych & pt is voluntary. Magali Monet RN,ext [phone]

## 2019-02-14 NOTE — PROGRESS NOTES
Problem: Pressure Injury - Risk of 
Goal: *Prevention of pressure injury Document Hal Scale and appropriate interventions in the flowsheet. Outcome: Progressing Towards Goal 
Pressure Injury Interventions: Activity Interventions: Pressure redistribution bed/mattress(bed type) Mobility Interventions: Float heels Nutrition Interventions: Document food/fluid/supplement intake Problem: Falls - Risk of 
Goal: *Absence of Falls Document Jacobo Knox Fall Risk and appropriate interventions in the flowsheet. Outcome: Progressing Towards Goal 
Fall Risk Interventions: 
Mobility Interventions: Patient to call before getting OOB Medication Interventions: Evaluate medications/consider consulting pharmacy

## 2019-02-14 NOTE — ADT AUTH CERT NOTES
REQUEST FOR INPATIENT AUTHORIZATION Facility Name: [facility]  NPI # [id number] MD Roque Springer  NPI # [id number]       
  
  
  
  
  
   
Patient Demographics Patient Name Lulu Rubio Sex Male  
1960 Address [address] Phone [phone] (Mobile) Hospital Account Name Acct ID Class Status Primary Coverage Lulu Rubio [id number] [facility] COM[facility]TE CARE  
  
   
Guarantor Account (for Hospital Account [de-identified]) Name Relation to Pt Service Area Active? Acct Type Lulu Rubio Self [address] Yes Personal/Family Address Phone [facility] [address] [facility]     
  
   
Coverage Information (for Hospital Account [de-identified]) F/O Payor/Plan Subscriber  Subscriber Sex Precert #  
[facility] 62 M Subscriber Subscriber # Lulu Rubio [id number] [facility] # Group Name [id number] Address Phone [address] [facility] [address] Policy Number Status Effective Date Benefits Phone [phone] -  - Auth/Cert  
  
  
   
Diagnosis Codes Comments Suicidal ideations  ICD-10-CM: R45.851 ICD-9-CM: V62.84   
  
   
Admission Information Arrival Date/Time: 2019 0115 Admit Date/Time: 2019 0126 IP Adm. Date/Time: 2019 0830 Admission Type: Emergency Point of Origin: Non-health Care Facility/self Admit Category:   
Means of Arrival: Public Transportation Primary Service: Medicine Secondary Service: N/A Transfer Source:  Service Area: [facility] Unit: [place] [id number] NEURO MED Admit Provider: Justice Joyce MD Attending Provider: Milvia Segovia MD Referring Provider:   
Admission Information Attending Provider Admission Dx Admitted On  
Justice Joyce MD Wound dehiscence 19 Service Isolation Code Status MEDICINE  Full Code Allergies Advance Care Planning No Known Allergies Jump to the Activity    
Admission Information Unit/Bed: [facility] 2 NEURO MED/01 Service: MEDICINE Admitting provider: Jacobo Cornelius MD Phone: [phone] Attending provider: Jacobo Cornelius MD Phone: [phone] PCP: Fei Andino MD Phone: [phone] Admission dx:  Patient class: I Admission type: ER    
Patient Demographics Patient Name Lindsay Almanzar, [address] [facility] 
[id number] Sex Male  
1960 Address [address] Phone [phone] (Mobile) CSN:  
[id number] Admit Date: Admit Time Room Bed 2019  1:26 AM 2107 [[id number]] 01 [[id number]] Attending Providers Provider Pager From To Juventino Parker MD  19 Radha Hart MD  19 Jacobo Cornelius MD  19 Emergency Contact(s) Name Relation Home Work Mobile Beatrice Barnes [phone] Utilization Reviews LOC:Acute Adult-General Medical (2019) by Jennifer Quinteros RN Review Entered Review Status 2019 09:32 In Primary Criteria Review REVIEW SUMMARY 
  
Patient: Ulises De Review Number: [id number] Review Status: In Primary 
  
Condition Specific: Yes 
  
Condition Level Of Care Code: ACUTE Condition Level Of Care Description: Acute 
  
  
OUTCOMES Outcome Type: Primary 
  
  
  
REVIEW DETAILS 
  
Service Date: 2019 Admit Date: 2019 Product: Nomarlena Bellon Adult Subset: General Medical 
    (Symptom or finding within 24h) 
  (Excludes PO medications unless noted)         [X] Select Day, One: 
            [X] Episode Day 1, One: 
                [X] OBSERVATION, >= One: 
                    [X] General, >= One: 
                        [X] Danger to self or others, Both: 
                            [X] Condition, >= One: 
                                [ ] Finding, Both: 
                                    [ ] Finding, Both: 
 [X] Agitation or escalating self-injurious behavior ~--Admin,  [place] on 02- 09:23 AM--~ 
                                        imminent danger to self via poor self care, suicidal thoughts, h/o attempts and family hx of completed suicide. [X] Suicidal or homicidal ideation ~--Admin,  Pin [place] on 02- 09:24 AM--~ 
                                suicidal thoughts [X] Intervention, >= One: 
                                [X] Psychiatric crisis intervention or stabilization and observation every 15 min 
                                ~--Admin, IQ Admin Admin on 02- 09:24 AM--~ 
                                psych consult [ ] ACUTE, >= One: 
                ~--Admin, IQ Admin Admin on 02- 09:32 AM--~ 
                Patient is a 63 y.o. male who found by security outside the hospital with c/o of diff walking and increased pain to the left foot. Denies fever / chills. He was seen at bedside today in the ED resting quietly and in no apparent distress. He admits to continue to walking on the foot and not using crutches and that he left in the motel room. ROS: 
                Cardiovascular: Positive for leg swelling. Musculoskeletal: Positive for arthralgias and gait problem. Skin: Positive for color change, rash and wound.   
                Psychiatric/Behavioral: Positive for sleep disturbance and suicidal ideas. PHYSICAL EXAM: Psychiatric: His behavior is normal. His affect is angry. He is not actively hallucinating. He expresses suicidal ideation. He expresses suicidal plans ED MEDS: 
                 
                vancomycin (VANCOCIN) 2000 mg in  ml infusion Dose: 2,000 mg Freq: ONCE Route: IV 
                 
                 
                piperacillin-tazobactam (ZOSYN) 4.5 gram injection X-RAY LEFT FOOT:   
                1. Progressive and interval transmetatarsal amputation of the left foot without 
                superimposed acute radiographic abnormality. LABS: HGB 9.6, HCT 39.2, , BUN 19, ALT 15,  PODIARTY CONSULT: 
                Impression: 
                  
                Wound dehiscence left foot with cellulitis Non compliance Active Problems: 
                  [facility] (2/13/2019) 
                  
                  Suicidal intent (2/13/2019) 
                  
                  PAD (peripheral artery disease) ([facility] (1/2/2019) 
                  
                  Toe amputation status, left ([facility] (5/18/2018) 
                  
                  HTN (hypertension) (2/13/2019) 
                  
                  
                  
                Recommendation: 
                  
                Discussed with the patient all the above finding.  
                  
                Agree with admission for IVabx and LWC.   Pending patient's progress with the IVabx and LWC, will hold for now on any further surgical intervention given his history of non compliance.   
                  
                He has already had two TMA procedures first on March of 2018 at [facility] and then again 1/7/19 here at [place] [facility] a total revision of the TMA with flap closure. Both times, he chose to be completely non compliant and full wt bearing post - op on the surgical foot despite being given crutches and PT gait training.  
                  
                Will start him on [address] - flush with Dermal Wound Cleanser and then apply Medihoney paste with DSD daily 
                  PSYCH CONSULT: 
                A/P  Pt is a 57y/o dm with h/o mood d/o who came in with c/o feeling hopeless and suicidal.  Pt c/o inability to sleep, racing thoughts, increased energy an suicidal thoughts. He appeared manic as he was rather flirtatious, grandiose with a tangential, disorganized thought process. He endorsed being homeless and having lost everything  He admits to use of alcohol occasionally and marijuana. UDS shows cannabis. He has a h/o using cocaine, mushrooms and heroin. He recently had a partial amputation of his foot and c/o pain worsened by walking. Pt endorsed family history of affective do, substance abuse and completed suicide. Given current suicidal thoughts, h/o attempts, substance use and family hx of completed suicide, recommend admit for safety.   
                  
                1.  PT does present an imminent danger to self via poor self care, suicidal thoughts, h/o attempts and family hx of completed suicide. He would meet commitment criteria. Recommend admit to inpatient psych. Once medically cleared. 2.  Provide safety precautions. MEDS: 
                 
                atorvastatin (LIPITOR) tablet 40 mg  
                Dose: 40 mg 
                Freq: DAILY Route: PO 
                 
                bacitracin 500 unit/gram packet 1 Packet Dose: 1 Packet                 Freq: 2 TIMES DAILY Route: TP 
                 
 clopidogrel (PLAVIX) tablet 75 mg Dose: 75 mg Freq: DAILY Route: PO 
                 
                gabapentin (NEURONTIN) capsule 100 mg Dose: 100 mg Freq: 3 TIMES DAILY Route: PO 
                 
                HYDROcodone-acetaminophen (NORCO) 5-325 mg per tablet 1 Tab Dose: 1 Tab Freq: EVERY 4 HOURS AS NEEDED Route: PO 
                 
                metoprolol succinate (TOPROL-XL) XL tablet 25 mg  
                Dose: 25 mg 
                Freq: DAILY Route: PO 
                 
                nicotine (NICODERM CQ) 21 mg/24 hr patch 1 Patch Dose: 1 Patch Freq: DAILY Route: TD 
                 
                pantoprazole (PROTONIX) tablet 40 mg  
                Dose: 40 mg 
                Freq: DAILY BEFORE BREAKFAST Route: PO 
                 
                piperacillin-tazobactam (ZOSYN) 3.375 g in 0.9% sodium chloride (MBP/ADV) 100 mL MBP \"EXTENDED 4 HOUR INFUSIO Dose: 3.375 g Freq: EVERY 8 HOURS Route: IV 
                 
                vancomycin (VANCOCIN) 1,000 mg in 0.9% sodium chloride (MBP/ADV) 250 mL adv Dose: 1,000 mg Freq: EVERY 12 HOURS Route: IV 
                 
                HYDROcodone-acetaminophen (NORCO) 5-325 mg per tablet 1 Tab Dose: 1 Tab Freq: EVERY 6 HOURS AS NEEDED Route: PO X1 
                 
                ARIPiprazole (ABILIFY) tablet 15 mg  
                Dose: 15 mg 
                Freq: DAILY Route: PO 
                 
                VS:  T 97.8, P 93, R 16, /01, SPO2 98% RA 
                3.  Abilify 15mg po every day for s/o zulma/mood disorder. [ ] Skin, >= One: [ ] Wound dehiscence, Both: 
                            [X] Wound dehiscence 
  
Version: InterQual® 2018.1 Rusty   © 2018 [facility] and/or one of its [place]. All Rights Reserved. CPT only © 2017 [employer]. All Rights Reserved.

## 2019-02-14 NOTE — PROGRESS NOTES
Spoke with both Donna Moon and Brittani with the [facility]. Per Donna Moon, the pt is \"definitely voluntary. \"  
 
Call placed to [facility] and spoke with Raquel Mcgee. Pt's DC Summary and wound care nurse's note faxed to Raquel Mcgee per her request to [phone] requesting a call to the pt's nurse's station ([phone]). Notified Raquel Mcgee that I spoke with Ra Yates, manager of [facility] earlier today and informed her that the pt has daily dressing changes, and per Ra Yates this is not a problem. Informed Raquel Mcgee that we were just waiting for pt to be evaluated by the [facility] to determine if pt would be voluntary or involuntary. Jojo Hodges RN, BSN, ACM Outcomes Manager 
[phone] (phone)

## 2019-02-14 NOTE — PROGRESS NOTES
Follow up call placed to Michaela at the [address] [place] [address]. Confirmed with Michaela that she has received the pt's information sent via fax and that she will have someone sent over to evaluate pt for TDO ASAP. Update provided to Brenda Galindo at [place]. Abelino Libman RN, BSN, ACM Outcomes Manager 
[phone] (phone)

## 2019-02-14 NOTE — PROGRESS NOTES
Ángel 83 pt care from [facility]. Pt in bed, alert and oriented x 4. Not in any form of distress. Denies pain. Frequent use items and call bell within reach. Verbalized understanding to call for assistance. Bed locked in lowest position. Suicide precautions continued. 0302 - Patient removed his wound dressing. Patient stated \"I didn't want it on, and the Doctor will be here soon anyway. \" Notified patient that the dressing will be done after seen by the Podiatrist soon. Educated patient not to remove the dressing. 0138 - Notified security to locate patient's missing belongings. 9470 - Patient has a brief case and a stick in room. Pt made aware that these items cannot be stored in his room and will be stored with security. Patient refused. Paged security. 3294 - Bedside and Verbal shift change report given to Ethan Ngo RN (oncoming nurse) by Linh Aleman RN (offgoing nurse). Report included the following information SBAR, Kardex, Intake/Output, MAR and Recent Results.

## 2019-02-14 NOTE — PROGRESS NOTES
Notified by pt's , Ignacia Singh that pt is now threatening to leave. Call placed to [facility] and spoke with a supervisor, Royal Hanna. Explained to Royal Hanna the situation and that I did not believe the pt will stay long enough to be re assessed by Tele psych and requested screening for a TDO. Per Royal Hanna, she will have pt reviewed for TDO and requested information to be faxed to her at [phone]. Information was faxed. Provided Royal Hanna with my contact number should she have any further questions. Alba Anguiaon RN, BSN, ACM Outcomes Manager 
[phone] (phone)

## 2019-02-14 NOTE — PROGRESS NOTES
Reason for Admission:   Wound Dehiscence RRAT Score:  13 Plan for utilizing home health:  Not @ this time. Likelihood of Readmission:  Mod/Yellow Transition of Care Plan:  Discharge tp in-pt psych facility. Chart reviewed. Met with pt. He is homeless. Became agitated, asking for his things, states he is leaving, made him aware that we are seeking in-pt psych bed, he is agreeable but states he is not willing to go out of [address] [place] [address]. Informed by nursing that pt is trying to leave. MD made aware, states pt no longer considered voluntary if trying to physically leave & to call the [facility]. Spoke with Devang Mullen RN,  in ED, she will call [facility] & assist in this process. Will cont to follow. Magali Monet RN,ext [phone]. Patient has designated no one to participate in his/her discharge plan and to receive any needed information. Name:  
Address: 
Phone number: 
 
Patient and/or next of kin has been given the Outpatient Observation Information and Notification letter and all questions answered. Care Management Interventions PCP Verified by CM: Yes Transition of Care Consult (CM Consult): Discharge Planning Discharge Durable Medical Equipment: No 
Physical Therapy Consult: Yes Occupational Therapy Consult: Yes Speech Therapy Consult: Yes Current Support Network: Other(Homeless) Confirm Follow Up Transport: Self([facility] cab) Plan discussed with Pt/Family/Caregiver:  Yes

## 2019-02-14 NOTE — PROGRESS NOTES
Podiatry Surgery Progress Note Patient: Lucy Torres MRN: [id number] LOS: 1 YOB: 1960  Age: 63 y.o. Sex: male Admit Date: 2/13/2019 POD * No surgery found * Procedure:   * No surgery found * Subjective:  
 
Patient is a 63 y.o. male seen at bedside this morning resting quietly and in no apparent distress wanting pain meds. He was admitted yesterday after being found by security outside the hospital with c/o of diff walking and increased pain to the left foot. Denies fever / chills. He admits to continue to walking on the foot and not using crutches and that he left in the motel room. Review of Systems:   
Positives in [place]** General: denies chronic fatigue, weight loss, fever, anemia, bruising, depression, nervousness, panic attacks HEENT: denies ringing in ears,  dizzy spells, poor vision, glaucoma, sinus trouble, hoarseness GI:  denies bloating, heartburn, regurgitation, difficulty swallowing, painful swallowing, nausea, abdominal pain, decreased appetite Lungs: denies pneumonia, asthma, cough, SOB, hemoptysis Heart: denies chest pain, irregular heart beat Skin: denies rashes, hives, allergic reaction (left foot noted as below) Urinary: denies UTI, kidney stones,  blood in urine, Bones and Joints: denies arthritis, rheumatism, back pain, gout, osteoporosis Neurologic: denies headaches, numbness, tingling Endocrine: No Abnormal Findings Psych: Anxiety and depression, Suicidal ideation Allergic/Immunologic:  No Abnormal Findings. Objective:  
 
Vitals Patient Vitals for the past 8 hrs: 
 BP Temp Pulse Resp SpO2  
02/14/19 0617 157/77 98.7 °F (37.1 °C) 60 18 95 % I/O Current Shift No intake/output data recorded. I/O Last Three Shifts 02/12 0701 - 02/13 1900 In: 0 [P.O.:600; I.V.:100] Out: -  
   
Data Review Recent Results (from the past 24 hour(s)) CBC W/O DIFF Collection Time: 02/14/19  5:35 AM  
Result Value Ref Range WBC 8.2 4.6 - 13.2 K/uL  
 RBC 3.77 (L) 4.70 - 5.50 M/uL  
 HGB 10.0 (L) 13.0 - 16.0 g/dL HCT 32.7 (L) 36.0 - 48.0 % MCV 86.7 74.0 - 97.0 FL  
 MCH 26.5 24.0 - 34.0 PG  
 MCHC 30.6 (L) 31.0 - 37.0 g/dL  
 RDW 14.4 11.6 - 14.5 % PLATELET 498 838 - 047 K/uL MPV 9.6 9.2 - 11.8 FL Physical Exam:   
Pamela Caldwell a 63 y. o. male who is pleasant, alert and oriented x3, in no apparent distress. Patient is well-developed and nourished, with good attention to hygiene and body habitus. Mood and affect normal, appropriate to situation. 
  
Left foot: s/p TMA site. DSD NOT intact. There is some central gapping of the incison site with sutures still present. There is  some moderate eschar, mild edema, no active weeping / drainage. Today with some malodor still present and some diffuse erythema present localized to the tonja wound area. No ascending lymphangitis.  
  
Vascular Exam:  
Dorsalis Pedis 1/4 and Posterior Tibial 2/4  Bilaterally. Skin temp warm to warm. Pedal hair is decreased. There are not varicosities present.  
  
Neurological Exam:  
Light touch protective sensation is absent to both feet. There is noted Loss of protective sensation.  
  
Musculoskeletal Exam:  
Muscle tone is normal for age. The muscle strength is 5/5 for the flexors, extensors, inverters, and everter's. 
  
Dermatological Exam:  
Skin is of abnormal texture and turgor with some atrophic skin changes noting decreased hair growth, nail changes (thickening), pigmentary changes, skin texture (thin,shiney), skin color (rubor, red) . 
  
RADIOLOGICAL EXAM: (see report for details) Without superimposed acute radiographic abnormality - no discrete osseous erosions. Wound Presentation: Wound Toe (specify in comments) Anterior; Left (Active) Number of days: 42 Wound Foot Left (Active) Number of days: 38  
  
   
Assessment:  
 
Principal Problem: Wound dehiscence (2/13/2019) Active Problems: Toe amputation status, left ([address] (5/18/2018) PAD (peripheral artery disease) ([address] (1/2/2019) Suicidal intent (2/13/2019) Mood disorder ([address] (2/13/2019) HTN (hypertension) (2/13/2019) NON COMPLIANCE Plan/Recommendations/Medical Decision Making:  
 
Discussed with the patient all the above finding. Reviewed the importance of keeping the area covered.  
  
Continue IVabx and LWC. Discussed with the staff that need to keep dressing on his foot. Pending patient's progress with the IVabx and LWC, will continue to hold for now on any further surgical intervention given his history of non compliance.   
  
Again he has already had two TMA procedures first on March of 2018 at [facility] and then again 1/7/19 here at [facility] a total revision of the TMA with flap closure. Both times, he was completely non compliant and walked on the surgical foot despite being given crutches and PT gait training. Dr Kuldip Nickerson Podiatric Surgeon [facility]) [phone] 2/14/2019 
6:52 AM

## 2019-02-14 NOTE — PROGRESS NOTES
Case discussed with Frandy Mcdaniel, crisis worker at [facility]. Frandy Mcdaniel requesting clarification of pt's potential needs at discharge (I.e. antibx- oral vs IV, ? Need for Lovenox, medical treatment/s to post op wound). Paged Dr. Karina Soliz and requested a discharge summary, providing clarification of pt's discharge medications and any potential treatment (I.e wound care). Per Dr. Karina Soliz, he will do the DC Summary. Update provided to pt's , Chetna Joseph. Hannah Hercules RN, BSN, ACM Outcomes Manager 
[phone] (phone)

## 2019-02-14 NOTE — PROGRESS NOTES
Problem: Pressure Injury - Risk of 
Goal: *Prevention of pressure injury Document Hal Scale and appropriate interventions in the flowsheet. Outcome: Progressing Towards Goal 
Pressure Injury Interventions: Activity Interventions: Pressure redistribution bed/mattress(bed type) Mobility Interventions: Float heels Nutrition Interventions: Document food/fluid/supplement intake

## 2019-02-14 NOTE — DISCHARGE SUMMARY
2 [facility]  Hospitalist Division    Discharge Summary    Patient: Germania Casey MRN: [id number]  CSN: [id number]    YOB: 1960  Age: 63 y.o. Sex: male    DOA: 2/13/2019 LOS:  LOS: 1 day   Discharge Date:      Admission Diagnoses: Wound dehiscence [T81.30XA]    Discharge Diagnoses:    Problem List as of 2/14/2019 Date Reviewed: 1/3/2019          Codes Class Noted - Resolved    * (Principal) Wound dehiscence ICD-10-CM: T81.30XA  ICD-9-CM: 998.30  2/13/2019 - Present        Mood disorder ([facility] ICD-10-CM: F39  ICD-9-CM: 296.90  2/13/2019 - Present        Suicidal intent ICD-10-CM: R45.851  ICD-9-CM: V62.84  2/13/2019 - Present        PAD (peripheral artery disease) ([facility] ICD-10-CM: I73.9  ICD-9-CM: 443.9  1/2/2019 - Present        HTN (hypertension) ICD-10-CM: I10  ICD-9-CM: 401.9  2/13/2019 - Present        Osteomyelitis of left foot ([facility] ICD-10-CM: M86.9  ICD-9-CM: 730.27  1/4/2019 - Present    Overview Signed 1/4/2019 12:55 PM by Josy Cota MD     1st metatarsal head              A-fib ([facility] ICD-10-CM: I48.91  ICD-9-CM: 427.31  1/2/2019 - Present        GI bleed ICD-10-CM: K92.2  ICD-9-CM: 578.9  1/2/2019 - Present        Hypocalcemia ICD-10-CM: E83.51  ICD-9-CM: 275.41  1/2/2019 - Present        Hypokalemia ICD-10-CM: E87.6  ICD-9-CM: 276.8  1/2/2019 - Present        Acute blood loss anemia ICD-10-CM: D62  ICD-9-CM: 285.1  1/2/2019 - Present        Toe amputation status, left ([facility] ICD-10-CM: Y75.291  ICD-9-CM: V49.72  5/18/2018 - Present        PVD (peripheral vascular disease) ([facility] ICD-10-CM: I73.9  ICD-9-CM: 443.9  5/18/2018 - Present              Discharge Condition: Stable    Discharge To:  psych facility    Consults: Psychiatry and [address] Course: Germania Casey is a homeless 63 y.o. male with a PMHx of HTN, depression, PVD who presented to the ED with c/o left foot pain and infection.  Pt had a L TMA in January and reports pain increased significantly yesterday after he walked around a lot to appointments. Patient also reports suicidal ideation. Psych consulted in ED and recs Abilify 15mg po daily for mood disorder and admission to inpatient psych once medically cleared. UDS +THC. Podiatry consulted as well - recs admission for IV abx. XR neg for OM. Pt will be admitted for further evaluation. Podiatry saw patient on 2/14 and decided to hold on surgery considering he also already had two TMA procedures (3/2018 and 1/2019) and \"both times he was completely non-compliant and walked on the surgical foot despite being given crutches and PT gait training. \" Will continue antibiotics. VSS. PT recommending crutches and rolling walker, but patient refuses. Consulted wound care for specific wound care orders. Medically cleared for discharge to psych facility. Physical Exam:  General Appearance: NAD, conversant  HENT: normocephalic/atraumatic, moist mucus membranes  Lungs: CTA with normal respiratory effort  Cardiovascular: RRR, no m/r/g, capillary refill < 2 sec, B/L DP/PT pulses +3/4  Abdomen: soft, non-tender, normal bowel sounds  Extremities: no cyanosis, no peripheral edema, left foot s/p TMA with malodorous wound dehiscence  Neuro: moves all extremities, no focal deficits  Psych: suicidal, manic, agitated      Significant Diagnostic Studies:     BMP:   Lab Results   Component Value Date/Time     02/14/2019 05:35 AM    K 4.0 02/14/2019 05:35 AM     02/14/2019 05:35 AM    CO2 28 02/14/2019 05:35 AM    AGAP 6 02/14/2019 05:35 AM     (H) 02/14/2019 05:35 AM    BUN 11 02/14/2019 05:35 AM    CREA 1.02 02/14/2019 05:35 AM    GFRAA >60 02/14/2019 05:35 AM    GFRNA >60 02/14/2019 05:35 AM     CBC:   Lab Results   Component Value Date/Time    WBC 8.2 02/14/2019 05:35 AM    HGB 10.0 (L) 02/14/2019 05:35 AM    HCT 32.7 (L) 02/14/2019 05:35 AM     02/14/2019 05:35 AM       No results found.       Discharge Medications: Current Discharge Medication List      START taking these medications    Details   trimethoprim-sulfamethoxazole (BACTRIM DS) 160-800 mg per tablet Take 1 Tab by mouth two (2) times a day. Qty: 20 Tab, Refills: 0      cephALEXin (KEFLEX) 500 mg capsule Take 1 Cap by mouth four (4) times daily for 10 days. Qty: 40 Cap, Refills: 0      ARIPiprazole (ABILIFY) 15 mg tablet Take 1 Tab by mouth daily. Qty: 30 Tab, Refills: 0         CONTINUE these medications which have NOT CHANGED    Details   metoprolol succinate (TOPROL-XL) 25 mg XL tablet Take 1 Tab by mouth daily. Qty: 30 Tab, Refills: 0      pantoprazole (PROTONIX) 40 mg tablet Take 1 Tab by mouth daily. Qty: 30 Tab, Refills: 0      clopidogrel (PLAVIX) 75 mg tab Take 1 Tab by mouth daily. Qty: 30 Tab, Refills: 0      aspirin (ASPIRIN) 325 mg tablet 325 mg.      atorvastatin (LIPITOR) 40 mg tablet 40 mg.      venlafaxine-SR (EFFEXOR-XR) 150 mg capsule Take 1 Cap by mouth daily. Indications: ANXIETY WITH DEPRESSION  Qty: 90 Cap, Refills: 3      gabapentin (NEURONTIN) 100 mg capsule Take 1 Cap by mouth three (3) times daily. Qty: 90 Cap, Refills: 2      therapeutic multivitamin-minerals (THERAGRAN-M) tablet Take 1 Tab by Mouth Once a Day. oxyCODONE IR (ROXICODONE) 5 mg immediate release tablet Take 1-2 Tabs by mouth every four (4) hours as needed. Max Daily Amount: 60 mg.  Qty: 24 Tab, Refills: 0    Associated Diagnoses: Toe amputation status, left (HCC)      nicotine (NICODERM CQ) 21 mg/24 hr 1 Patch.              Activity: Activity as tolerated, ambulate with crutches/rolling walker    Diet: Regular Diet    Wound Care: per wound care     Follow-up: PCP in 1 week    Discharge time: >35 minutes    [facility]  Office:  [phone]  Pager: [phone]      2/14/2019, 11:55 AM

## 2019-02-14 NOTE — WOUND CARE
Received IP wound care consult and reviewed EMR. Patient currently being followed by Podiatry with appropriate wound care orders placed. Per Dr Nazario Mt nursing to perform daily dressing of aquacel ag.

## 2019-02-14 NOTE — PROGRESS NOTES
Freddie Cross RN in the ED per pt no other belongings transferred w/ pt, no belongings noted. RN states no belongings noted in the ED.

## 2019-02-14 NOTE — ROUTINE PROCESS
Bedside and Verbal shift change report given to Linh Aleman, RN (oncoming nurse) by Melissa Garcia RN, BSN (offgoing nurse). Report given with SBAR, Kardex, Intake/Output, MAR and Recent Results.

## 2019-02-14 NOTE — ED NOTES
Patients blue suitcase, hat and wooden fishing bayron delivered to room 2107 at this time by ed tech Familia Santiago.,.  Per Tracie Morrison patients other 3 bags of belongings were sent with patient to floor when transferred from ER

## 2019-02-14 NOTE — PROGRESS NOTES
conducted an initial consultation and Spiritual Assessment for Cornell Newell, who is a 63 y. o.,male. Patients Primary Language is: [place]. According to the patients EMR Yarsanism Affiliation is: [place]. The reason the Patient came to the hospital is:  
Patient Active Problem List  
 Diagnosis Date Noted  Wound dehiscence 02/13/2019  Suicidal intent 02/13/2019  Mood disorder ([facility] 02/13/2019  
 HTN (hypertension) 02/13/2019  Osteomyelitis of left foot ([facility] 01/04/2019  A-fib ([facility] 01/02/2019  GI bleed 01/02/2019  Hypocalcemia 01/02/2019  Hypokalemia 01/02/2019  PAD (peripheral artery disease) ([facility] 01/02/2019  Acute blood loss anemia 01/02/2019  Toe amputation status, left ([facility] 05/18/2018  PVD (peripheral vascular disease) ([facility] 05/18/2018 The  provided the following Interventions: 
Initiated a relationship of care and support. Provided information about Spiritual Care Services. Offered prayer and assurance of continued prayers on patient's behalf. The following outcomes were achieved: 
Patient expressed gratitude for 's visit. Assessment: 
There are no further spiritual or Christianity issues which require intervention at this time. Plan: 
Chaplains will continue to follow and will provide pastoral care on an as needed/requested basis. Pradeep Marquis M.Div. , [address] [address] [facility]: [phone]/JOSE D: [phone]

## 2019-02-15 VITALS
DIASTOLIC BLOOD PRESSURE: 85 MMHG | RESPIRATION RATE: 17 BRPM | HEART RATE: 96 BPM | TEMPERATURE: 97.1 F | SYSTOLIC BLOOD PRESSURE: 164 MMHG

## 2019-02-15 PROBLEM — F32.A DEPRESSION: Status: ACTIVE | Noted: 2019-02-15

## 2019-02-15 PROCEDURE — 74011250637 HC RX REV CODE- 250/637: Performed by: PSYCHIATRY & NEUROLOGY

## 2019-02-15 RX ORDER — CEPHALEXIN 500 MG/1
500 CAPSULE ORAL EVERY 6 HOURS
Qty: 36 CAP | Refills: 0 | Status: SHIPPED | OUTPATIENT
Start: 2019-02-15 | End: 2019-06-19

## 2019-02-15 RX ORDER — SULFAMETHOXAZOLE AND TRIMETHOPRIM 800; 160 MG/1; MG/1
1 TABLET ORAL EVERY 12 HOURS
Qty: 18 TAB | Refills: 0 | Status: SHIPPED | OUTPATIENT
Start: 2019-02-15 | End: 2019-06-19

## 2019-02-15 RX ADMIN — COLLAGENASE SANTYL: 250 OINTMENT TOPICAL at 09:14

## 2019-02-15 RX ADMIN — CLOPIDOGREL BISULFATE 75 MG: 75 TABLET, FILM COATED ORAL at 09:13

## 2019-02-15 RX ADMIN — METOPROLOL SUCCINATE 25 MG: 25 TABLET, EXTENDED RELEASE ORAL at 09:13

## 2019-02-15 RX ADMIN — CEPHALEXIN 500 MG: 250 CAPSULE ORAL at 06:55

## 2019-02-15 RX ADMIN — SULFAMETHOXAZOLE AND TRIMETHOPRIM 1 TABLET: 800; 160 TABLET ORAL at 09:15

## 2019-02-15 RX ADMIN — ARIPIPRAZOLE 5 MG: 5 TABLET ORAL at 09:13

## 2019-02-15 RX ADMIN — ATORVASTATIN CALCIUM 40 MG: 40 TABLET, FILM COATED ORAL at 09:13

## 2019-02-15 RX ADMIN — VENLAFAXINE HYDROCHLORIDE 150 MG: 150 CAPSULE, EXTENDED RELEASE ORAL at 09:13

## 2019-02-15 RX ADMIN — SULFAMETHOXAZOLE AND TRIMETHOPRIM 1 TABLET: 800; 160 TABLET ORAL at 04:11

## 2019-02-15 RX ADMIN — MULTIPLE VITAMINS W/ MINERALS TAB 1 TABLET: TAB at 09:13

## 2019-02-15 RX ADMIN — PANTOPRAZOLE SODIUM 40 MG: 40 TABLET, DELAYED RELEASE ORAL at 06:55

## 2019-02-15 RX ADMIN — CEPHALEXIN 500 MG: 250 CAPSULE ORAL at 01:13

## 2019-02-15 RX ADMIN — GABAPENTIN 100 MG: 100 CAPSULE ORAL at 09:13

## 2019-02-15 NOTE — BH NOTES
GROUP THERAPY PROGRESS NOTE Griselda Mallory is participating in Coping Skills Group. Group time: 30 minutes Goal orientation: personal 
 
Group therapy participation: disruptive Therapeutic interventions reviewed and discussed: We listened to the song \"Stressed Out\" by [employer] and discussed the lyrics. We were able to identify several  Phrases that could be interpreted as coping skills for dealing with stress. These included supportive personal relationships, getting good sleep, using our five senses for comfort, taking a walk in nature (exercise), reminiscing about good times, hobbies, doing something just for fun, working on self-esteem, STOP worrying. Impression of participation:   Clementlukasz Henrych was attentive during most of the group. A female peer was focused on him for part of the group and he got up and sat away from the group for a brief time, but then came back when the peer left.
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
## 2019-02-15 NOTE — BH NOTES
Patient approached writer demanding \"2 plastic bags I want 2 plastic bags\". Mht was addressed regarding patient need for plastic bag with patient becoming agitated when mht responded that she had not directed the patient to receive plastic bags. Patient began cursing and throwing item with no receptiveness to mht addressing him. Patient continued to curse and demanded to leave. \"\"fuck it I can't get nothing here. They ain't trying to give me what I need\". Patient on phone cursing \"Mom they ain't giving me none. I don't give a fuck, so what I'm leaving\". Patient then began demanding property to leave. MD was notified of patient's request to leave and his behavior. Patient continued to shout and demand his property \"I ain't leaving at no nighttime\". Patient was assured he would not have to wait until night time to see MD to discharge with patient stating \"well Monica told me that I can't leave until night time\". Patient was advised that patient was incorrect in stating that. \"well I can't get what I need and you guys can order lunch for 2 hours\". Patient is demanding with needs and has been sarcastic and arrogant with verbal responses.

## 2019-02-15 NOTE — ANCILLARY DISCHARGE INSTRUCTIONS
Call made to [phone], spoke with Rachel Irwin, Dr. Gilson Pollock has accepted this patient into room 106 bed 3, nurse should call report to [phone] after 8:00 pm.

## 2019-02-15 NOTE — PROGRESS NOTES
Assume care of patient lying in bed with sitter at bedside. Alert and oriented X 4. Patient noted with flat affect and very demanding. Denies pain or discomfort at present. Bed locked in lowest position. Call light within reach and understand to use for assistance and needs. 2200 EMTALA completed and patient transported to [facility] via [address] transport. Patient has all belonging including personal belonging with suitcase and fishing rods and large plastic bags with clothes and personal paper inside.

## 2019-02-15 NOTE — BSMART NOTE
SOCIAL WORK GROUP THERAPY PROGRESS NOTE Group Time:  10am 
 
Group Topic:  Coping Skills Group Participation:  
 
Pt expressed not interested in attending session despite staff support

## 2019-02-15 NOTE — DISCHARGE INSTRUCTIONS
BEHAVIORAL HEALTH NURSING DISCHARGE NOTE      Emergency Numbers    : [facility]: [phone]  Suicide Prevention Line: 5 [phone] (TALK)      The following personal items collected during your admission are returned to you:   Dental Appliance: Dental Appliances: None  Vision:    Hearing Aid:    Jewelry: Jewelry: None  Clothing: Clothing: Belt, Footwear, [place], Jacket/Coat, Pants, Shirt, Socks, Sweater, Undergarments  Other Valuables: Other Valuables: Cell Phone, Personal electronic devices (comment)(phone , ID,[employer],medical card,Social Security)  Valuables sent to safe: Personal Items Sent to Safe: none        The discharge information has been reviewed with the patient. The patient verbalized understanding. OneID Activation    Thank you for requesting access to OneID. Please follow the instructions below to securely access and download your online medical record. OneID allows you to send messages to your doctor, view your test results, renew your prescriptions, schedule appointments, and more. How Do I Sign Up? 1. In your internet browser, go to [email or web address]  2. Click on the First Time User? Click Here link in the Sign In box. You will be redirect to the New Member Sign Up page. 3. Enter your OneID Access Code exactly as it appears below. You will not need to use this code after youve completed the sign-up process. If you do not sign up before the expiration date, you must request a new code. OneID Access Code: [email or web address]  Expires: 2019  9:22 AM (This is the date your OneID access code will )    4. Enter the last four digits of your Social Security Number (xxxx) and Date of Birth (mm/dd/yyyy) as indicated and click Submit. You will be taken to the next sign-up page. 5. Create a OneID ID.  This will be your OneID login ID and cannot be changed, so think of one that is secure and easy to remember. 6. Create a [employer] password. You can change your password at any time. 7. Enter your Password Reset Question and Answer. This can be used at a later time if you forget your password. 8. Enter your e-mail address. You will receive e-mail notification when new information is available in [address] 9. Click Sign Up. You can now view and download portions of your medical record. 10. Click the Download Summary menu link to download a portable copy of your medical information. Additional Information    If you have questions, please visit the Frequently Asked Questions section of the [employer] website at [email or web address] Remember, [employer]k is NOT to be used for urgent needs. For medical emergencies, dial 911.     Patient armband removed and shredded
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
## 2019-02-15 NOTE — ANCILLARY DISCHARGE INSTRUCTIONS
Call made to [facility] transportation [phone], spoke with [place], trip number [id number].

## 2019-02-15 NOTE — ANCILLARY DISCHARGE INSTRUCTIONS
Call made to [address] [place] [address],[address], they have a crew on site will [facility].

## 2019-02-15 NOTE — WOUND CARE
Physical Exam  
Musculoskeletal:  
     Feet: 
 
WOUND POA CONDITIONS Wound Foot Left (Active) Dressing Status  Removed 2/15/2019  8:02 AM  
Dressing Type  Gauze 2/15/2019  8:02 AM  
Incision site well approximated? No 2/15/2019  8:02 AM  
Non-Pressure Injury Partial thickness (epider/derm) 2/15/2019  8:02 AM  
Wound Length (cm) 8 cm 2/15/2019  8:02 AM  
Wound Width (cm) 1.2 cm 2/15/2019  8:02 AM  
Wound Depth (cm) 0.4 2/15/2019  8:02 AM  
Wound Surface area (cm^2) 9.6 cm^2 2/15/2019  8:02 AM  
Condition of [facility] 2/15/2019  8:02 AM  
Condition of Edges Open 2/15/2019  8:02 AM  
Tissue Type Yellow;Red 2/15/2019  8:02 AM  
Tissue Type Percent Red 2 2/15/2019  8:02 AM  
Tissue Type Percent Yellow 98 2/15/2019  8:02 AM  
Drainage Amount  Small  2/15/2019  8:02 AM  
Drainage Color Serosanguinous 2/15/2019  8:02 AM  
Wound Odor None 2/15/2019  8:02 AM  
Periwound Skin Condition Erythema, blanchable; Indurated; Macerated 2/15/2019  8:02 AM  
Cleansing and Cleansing Agents  Normal saline 2/15/2019  8:02 AM  
Dressing Type Applied Gauze 2/15/2019  8:02 AM  
Procedure Tolerated Well 2/15/2019  8:02 AM  
Number of days: 39 Seen by wound care bedside nurse present during exam, foot tissue injury assessment performed at this time. Tissue injury listed above noted during skin assessment. Treatment plan explained to staff nurse and Pt agreeable to continue current treatment. Nursing to apply Santyl ointment to wound bed with saline moist gauze covering incision line only and secure with tegaderm or coverderm dressing. Nursing to [place] in [place] with patient's Attending to clarify Podiatry consult request. 
Wound care education provided to pt at this time. Plan of care reviewed with nursing. Will turn over care to nursing staff at this time [place] Me, Wound Care Department

## 2019-02-15 NOTE — BSMART NOTE
DC Plan:  ARIS assisted pt with obtaining aftercare appointment with PCP. Pt. has an appointment with Hayden Baca on 2/19/19 @ 1:15 p.m [facility] @ [address]   [address] [phone]. ARIS contacted care manager supervisor to ask for assistance with obtaining a Lyft ride for pt to return to admitting address. Pt. Has Medicaid and therefore can not receive Lyft ride. ARIS assisted pt with obtaining transportation home.

## 2019-02-15 NOTE — BH NOTES
Patient has been discharged to self and will follow up with appointment scheduled with Shelly Ponce on 2/19/2019 at 1:15 pm with [facility]. Patient has been oppositional and argumentative throughout discharge process. Patient refused to sign property sheet stating \"I ain't signing it\". Patient property was sealed and kept in storage and was sealed on admission and not opened throughout admission until patient received Velcro shoe prior to discharging. Patient has been provided with information regarding mental health follow up care has been provided. Patient has been educated regarding seeking additional support if needed with information listed on discharge paper work and emergency card provided. Patient is pending arrival of transportation (Taxi) for transport to home.

## 2019-02-15 NOTE — BH NOTES
Pt discharged to care of self to be transported via cab. Pt denies SI. After care instructions reviewed with pt who verbalized understanding. Copy of after care and prescriptions provided. Belongings returned.

## 2019-02-15 NOTE — DISCHARGE INSTRUCTIONS
DISCHARGE SUMMARY from Nurse    PATIENT INSTRUCTIONS:    After general anesthesia or intravenous sedation, for 24 hours or while taking prescription Narcotics:  · Limit your activities  · Do not drive and operate hazardous machinery  · Do not make important personal or business decisions  · Do  not drink alcoholic beverages  · If you have not urinated within 8 hours after discharge, please contact your surgeon on call. Report the following to your surgeon:  · Excessive pain, swelling, redness or odor of or around the surgical area  · Temperature over 100.5  · Nausea and vomiting lasting longer than 4 hours or if unable to take medications  · Any signs of decreased circulation or nerve impairment to extremity: change in color, persistent  numbness, tingling, coldness or increase pain  · Any questions    What to do at Home:  Recommended activity: Activity as tolerated,     If you experience any of the following symptoms as listed under \" When should I call for help? \" in your discharge teaching  , please follow up with your Doctor and/ or call 911. *  Please give a list of your current medications to your Primary Care Provider. *  Please update this list whenever your medications are discontinued, doses are      changed, or new medications (including over-the-counter products) are added. *  Please carry medication information at all times in case of emergency situations. These are general instructions for a healthy lifestyle:    No smoking/ No tobacco products/ Avoid exposure to second hand smoke  Surgeon General's Warning:  Quitting smoking now greatly reduces serious risk to your health.     Obesity, smoking, and sedentary lifestyle greatly increases your risk for illness    A healthy diet, regular physical exercise & weight monitoring are important for maintaining a healthy lifestyle    You may be retaining fluid if you have a history of heart failure or if you experience any of the following symptoms:  Weight gain of 3 pounds or more overnight or 5 pounds in a week, increased swelling in our hands or feet or shortness of breath while lying flat in bed. Please call your doctor as soon as you notice any of these symptoms; do not wait until your next office visit. Recognize signs and symptoms of STROKE:    F-face looks uneven    A-arms unable to move or move unevenly    S-speech slurred or non-existent    T-time-call 911 as soon as signs and symptoms begin-DO NOT go       Back to bed or wait to see if you get better-TIME IS BRAIN. Warning Signs of HEART ATTACK     Call 911 if you have these symptoms:   Chest discomfort. Most heart attacks involve discomfort in the center of the chest that lasts more than a few minutes, or that goes away and comes back. It can feel like uncomfortable pressure, squeezing, fullness, or pain.  Discomfort in other areas of the upper body. Symptoms can include pain or discomfort in one or both arms, the back, neck, jaw, or stomach.  Shortness of breath with or without chest discomfort.  Other signs may include breaking out in a cold sweat, nausea, or lightheadedness. Don't wait more than five minutes to call 911 - MINUTES MATTER! Fast action can save your life. Calling 911 is almost always the fastest way to get lifesaving treatment. Emergency Medical Services staff can begin treatment when they arrive -- up to an hour sooner than if someone gets to the hospital by car. The discharge information has been reviewed with the patient. The patient verbalized understanding. Discharge medications reviewed with the patient and appropriate educational materials and side effects teaching were provided. Patient armband removed and shredded      Patient Education        Foot Amputation: What to Expect at [place] [address] amputation is surgery to remove part or all of your foot.  Your doctor left as much healthy bone, skin, blood vessel, and nerve tissue as possible. After a foot amputation, you will probably have bandages, a rigid dressing, or a cast over the remaining part of your leg or foot. The leg or foot may be swollen for 4 weeks or longer after your surgery. If you have a rigid dressing or cast, your doctor will set up regular visits to change the dressing or cast and check the healing. If you have elastic bandages, your doctor will tell you how to change them. You may have pain in the remaining part of your foot. You also may think you have feeling or pain where your foot was. This is called phantom pain. It is common and may come and go for a year or longer. Your doctor can give you medicine for both types of pain. You may have been fitted with a temporary artificial foot while you were still in the hospital. If this is the case, your doctor will teach you how to care for it. If you are getting an artificial foot or prosthesis, you may need to get used to it before you return to work and your other activities. You will probably not wear it all the time, so you may need to learn how to use a wheelchair, crutches, or other device. You may have to make changes in your home. Your workplace may be able to make allowances for you. Having part or all of your foot removed is traumatic. Learning to live with new limitations can be hard and frustrating. You may feel depressed or grieve for your previous lifestyle. It is important to understand these feelings. Talking with your family, friends, and health professionals about your frustrations is an important part of your recovery. You may also find that it helps to talk with a person who has had an amputation. Remember that even though losing a foot is difficult, it does not change who you are or prevent you from enjoying life. You will have to adapt and learn new ways to do things, but you will still be able to work and take part in sports and activities.  And you can still learn, love, play, and live life to its fullest.  Many organizations can help you adjust to your new life. For example, you can find information at amputee-coalition.org. This care sheet gives you a general idea about how long it will take for you to recover. But each person recovers at a different pace. Follow the steps below to get better as quickly as possible. How can you care for yourself at home? Activity    · Be active. Talk to your doctor about what you can do. If you are active and use your remaining limb or foot, it will heal faster.     · You may shower when your doctor okays it. Wash the remaining limb or foot with mild soap and water, and pat it dry. You may need help doing this at first.     · You may be able to drive when you finish your rehab and have an artificial foot or prosthesis. You may need to adapt your car to your situation.     · You will probably be able to return to work and your usual routine when your remaining limb or foot heals. This may be as soon as 4 to 8 weeks after surgery. Diet    · You can eat your normal diet. If your stomach is upset, try bland, low-fat foods like plain rice, broiled chicken, toast, and yogurt.     · You may notice that your bowel movements are not regular right after your surgery. This is common. Try to avoid constipation and straining with bowel movements. Take a fiber supplement every day. If you have not had a bowel movement after a couple of days, ask your doctor about taking a mild laxative. Medicines    · Your doctor will tell you if and when you can restart your medicines. He or she will also give you instructions about taking any new medicines.     · If you take blood thinners, such as warfarin (Coumadin), clopidogrel (Plavix), or aspirin, be sure to talk to your doctor. He or she will tell you if and when to start taking those medicines again. Make sure that you understand exactly what your doctor wants you to do.     · Be safe with medicines.  Take pain medicines exactly as directed. ? If the doctor gave you a prescription medicine for pain, take it as prescribed. ? If you are not taking a prescription pain medicine, ask your doctor if you can take an over-the-counter medicine.     · If you think your pain medicine is making you sick to your stomach:  ? Take your medicine after meals (unless your doctor has told you not to). ? Ask your doctor for a different pain medicine.     · If your doctor prescribed antibiotics, take them as directed. Do not stop taking them just because you feel better. You need to take the full course of antibiotics.    Remaining limb or foot care    · You may have bandages, a rigid dressing, or a cast on your remaining limb or foot. Your doctor will tell you how to take care of it. Depending on your dressing and the doctor's instructions:  ? Check your remaining limb or foot daily for irritation, skin breaks, and redness. Tell your doctor about any problems you see. ? Wash your remaining limb or foot with mild soap and warm water every night. Pat it dry.     · If you have a temporary artificial foot, remove it before you go to sleep. Follow-up care is a key part of your treatment and safety. Be sure to make and go to all appointments, and call your doctor if you are having problems. It's also a good idea to know your test results and keep a list of the medicines you take. When should you call for help? Call 911 anytime you think you may need emergency care.  For example, call if:    · You passed out (lost consciousness).     · You have sudden chest pain and shortness of breath, or you cough up blood.     · You have severe trouble breathing.    Call your doctor now or seek immediate medical care if:    · You have loose stitches, or your incision comes open.     · You have bleeding from the incision in your remaining limb or foot that suddenly increases or does not stop when your doctor said it should.     · You have signs of infection, such as:  ? Increased pain, swelling, warmth, or redness. ? Red streaks leading from the incision. ? Pus draining from the incision. ? A fever.     · You are sick to your stomach or cannot keep fluids down.     · You have pain that does not get better after you take pain medicine.    Watch closely for any changes in your health, and be sure to contact your doctor if:    · You do not have a bowel movement after taking a laxative. Where can you learn more? Go to http://krystal-jenny.info/. Enter K732 in the search box to learn more about \"Foot Amputation: What to Expect at Home. \"  Current as of: September 20, 2018  Content Version: 11.9  © 6233-8598 [employer]. Care instructions adapted under license by [employer] (which disclaims liability or warranty for this information). If you have questions about a medical condition or this instruction, always ask your healthcare professional. [place] [address] any warranty or liability for your use of this information. Patient Education        High Blood Pressure: Care Instructions  Overview    It's normal for blood pressure to go up and down throughout the day. But if it stays up, you have high blood pressure. Another name for high blood pressure is hypertension. Despite what a lot of people think, high blood pressure usually doesn't cause headaches or make you feel dizzy or lightheaded. It usually has no symptoms. But it does increase your risk of stroke, heart attack, and other problems. You and your doctor will talk about your risks of these problems based on your blood pressure. Your doctor will give you a goal for your blood pressure. Your goal will be based on your health and your age. Lifestyle changes, such as eating healthy and being active, are always important to help lower blood pressure.  You might also take medicine to reach your blood pressure goal.  Follow-up care is a key part of your treatment and safety. Be sure to make and go to all appointments, and call your doctor if you are having problems. It's also a good idea to know your test results and keep a list of the medicines you take. How can you care for yourself at home? Medical treatment  · If you stop taking your medicine, your blood pressure will go back up. You may take one or more types of medicine to lower your blood pressure. Be safe with medicines. Take your medicine exactly as prescribed. Call your doctor if you think you are having a problem with your medicine. · Talk to your doctor before you start taking aspirin every day. Aspirin can help certain people lower their risk of a heart attack or stroke. But taking aspirin isn't right for everyone, because it can cause serious bleeding. · See your doctor regularly. You may need to see the doctor more often at first or until your blood pressure comes down. · If you are taking blood pressure medicine, talk to your doctor before you take decongestants or anti-inflammatory medicine, such as ibuprofen. Some of these medicines can raise blood pressure. · Learn how to check your blood pressure at home. Lifestyle changes  · Stay at a healthy weight. This is especially important if you put on weight around the waist. Losing even 10 pounds can help you lower your blood pressure. · If your doctor recommends it, get more exercise. Walking is a good choice. Bit by bit, increase the amount you walk every day. Try for at least 30 minutes on most days of the week. You also may want to swim, bike, or do other activities. · Avoid or limit alcohol. Talk to your doctor about whether you can drink any alcohol. · Try to limit how much sodium you eat to less than 2,300 milligrams (mg) a day. Your doctor may ask you to try to eat less than 1,500 mg a day. · Eat plenty of fruits (such as bananas and oranges), vegetables, legumes, whole grains, and low-fat dairy products.   · Lower the amount of saturated fat in your diet. Saturated fat is found in animal products such as milk, cheese, and meat. Limiting these foods may help you lose weight and also lower your risk for heart disease. · Do not smoke. Smoking increases your risk for heart attack and stroke. If you need help quitting, talk to your doctor about stop-smoking programs and medicines. These can increase your chances of quitting for good. When should you call for help? Call 911 anytime you think you may need emergency care. This may mean having symptoms that suggest that your blood pressure is causing a serious heart or blood vessel problem. Your blood pressure may be over 180/120.   For example, call 911 if:    · You have symptoms of a heart attack. These may include:  ? Chest pain or pressure, or a strange feeling in the chest.  ? Sweating. ? Shortness of breath. ? Nausea or vomiting. ? Pain, pressure, or a strange feeling in the back, neck, jaw, or upper belly or in one or both shoulders or arms. ? Lightheadedness or sudden weakness. ? A fast or irregular heartbeat.     · You have symptoms of a stroke. These may include:  ? Sudden numbness, tingling, weakness, or loss of movement in your face, arm, or leg, especially on only one side of your body. ? Sudden vision changes. ? Sudden trouble speaking. ? Sudden confusion or trouble understanding simple statements. ? Sudden problems with walking or balance. ? A sudden, severe headache that is different from past headaches.     · You have severe back or belly pain.    Do not wait until your blood pressure comes down on its own.  Get help right away.   Call your doctor now or seek immediate care if:    · Your blood pressure is much higher than normal (such as 180/120 or higher), but you don't have symptoms.     · You think high blood pressure is causing symptoms, such as:  ? Severe headache.  ? Blurry vision.    Watch closely for changes in your health, and be sure to contact your doctor if:    · Your blood pressure measures higher than your doctor recommends at least 2 times. That means the top number is higher or the bottom number is higher, or both.     · You think you may be having side effects from your blood pressure medicine. Where can you learn more? Go to http://krystal-jenny.info/. Enter M850 in the search box to learn more about \"High Blood Pressure: Care Instructions. \"  Current as of: July 22, 2018  Content Version: 11.9  © 9244-4138 [employer] [facility]. Care instructions adapted under license by [employer] (which disclaims liability or warranty for this information). If you have questions about a medical condition or this instruction, always ask your healthcare professional. [address] any warranty or liability for your use of this information. Patient Education        Learning About Mood Disorders  What are mood disorders? Mood disorders are medical problems that affect how you feel. They can impact your moods, thoughts, and actions. Mood disorders include:  · Depression. This causes you to feel sad or hopeless for much of the time. · Bipolar disorder. This causes extreme mood changes from manic episodes of very high energy to extreme lows of depression. · Seasonal affective disorder (SAD). This is a type of depression that affects you during the same season each year. Most often people experience SAD during the fall and winter months when days are shorter and there is less light. What are the symptoms? Depression  You may:  · Feel sad or hopeless nearly every day. · Lose interest in or not get pleasure from most daily activities. You feel this way nearly every day. · Have low energy, changes in your appetite, or changes in how well you sleep. · Have trouble concentrating. · Think about death and suicide.  Keep the numbers for these national suicide hotlines: 3-005-997-TALK (8-192.510.3764) and 2-882-JRJDRMP (3-911.939.3484). If you or someone you know talks about suicide or feeling hopeless, get help right away. Bipolar disorder  Symptoms depend on your mood swings. You may:  · Feel very happy, energetic, or on edge. · Feel like you need very little sleep. · Feel overly self-confident. · Do impulsive things, such as spending a lot of money. · Feel sad or hopeless. · Have racing thoughts or trouble thinking and making decisions. · Lose interest in things you have enjoyed in the past.  · Think about death and suicide. Keep the numbers for these national suicide hotlines: 0-536-283-TALK (0-258.757.9713) and 5-016-PBDUUIC (4-109.239.1441). If you or someone you know talks about suicide or feeling hopeless, get help right away. Seasonal affective disorder (SAD)  Symptoms come and go at about the same time each year. For most people with SAD, symptoms come during the winter when there is less daylight. You may:  · Feel sad, grumpy, thornton, or anxious. · Lose interest in your usual activities. · Eat more and crave carbohydrates, such as bread and pasta. · Gain weight. · Sleep more and feel drowsy during the daytime. How are mood disorders treated? Mood disorders can be treated with medicines or counseling, or a combination of both. Medicines for depression and SAD may include antidepressants. Medicines for bipolar disorder may include:  · Mood stabilizers. · Antipsychotics. · Benzodiazepines. Counseling may involve cognitive-behavioral therapy. It teaches you how to change the ways you think and behave. This can help you stop thinking bad thoughts about yourself and your life. Light therapy is the main treatment for SAD. This therapy uses a special kind of lamp. You let the lamp shine on you at certain times, usually in the morning. This may help your symptoms during the months when there is less sunlight. Healthy lifestyle  Healthy lifestyle changes may help you feel better.   · Get at least 30 minutes of exercise on most days of the week. Walking is a good choice. · Eat a healthy diet. Include fruits, vegetables, lean proteins, and whole grains in your diet each day. · Keep a regular sleep schedule. Try for 8 hours of sleep a night. · Find ways to manage stress, such as relaxation exercises. · Avoid alcohol and illegal drugs. Follow-up care is a key part of your treatment and safety. Be sure to make and go to all appointments, and call your doctor if you are having problems. It's also a good idea to know your test results and keep a list of the medicines you take. Where can you learn more? Go to http://krystalGATR Technologiesjenny.info/. Enter X168 in the search box to learn more about \"Learning About Mood Disorders. \"  Current as of: September 11, 2018  Content Version: 11.9  © 9127-1574 [employer]. Care instructions adapted under license by [employer] (which disclaims liability or warranty for this information). If you have questions about a medical condition or this instruction, always ask your healthcare professional. [place] [address] any warranty or liability for your use of this information. Patient Education        Suicidal Thoughts and Behavior: Care Instructions  Your Care Instructions  You have been seen by a doctor because you've had thoughts about killing yourself. Maybe you have tried to do it. This is much different from having fleeting thoughts of death, which many people have from time to time. Your doctor and support team will work hard to help keep you safe. Your team may include a , a , and a counselor. Most people who think about suicide don't want to die. They think suicide will end their problems and pain. People who consider suicide often feel hopeless, helpless, and worthless. These thoughts can make a person feel that there is no other choice. But you do have a choice.  Help is always available. The doctor and staff members are taking you and your pain very seriously. It is important to remember that there are people who are willing and able to talk with you about your suicidal thoughts. Treatment and close follow-up care can help you feel better about life. Thoughts of hopelessness and suicide may come from being depressed. Depression is a medical condition. When you have depression, there may be problems with activity levels in certain parts of your brain. Chemicals in your brain called neurotransmitters may be out of balance. But depression can be treated. Treatment for depression includes counseling, medicines, and lifestyle changes. With treatment, you can feel better. Your doctor doesn't want you to hurt yourself. He or she may ask you to sign a \"no harm\" agreement or contract. This contract is your promise that you will not hurt yourself between now and your next visit. Be completely honest with your doctor if you feel that you can't sign it. You will get help. Follow-up care is a key part of your treatment and safety. Be sure to make and go to all appointments, and call your doctor if you are having problems. It's also a good idea to know your test results and keep a list of the medicines you take. How can you care for yourself at home? · Talk to someone. Reach out to family members, friends, your doctor, or a counselor. Be open and honest with them about your thoughts and feelings. · Be safe with medicines. Take your medicines exactly as prescribed. Call your doctor if you think you are having a problem with your medicine. · Avoid illegal drugs and alcohol. · Attend all counseling sessions recommended by your doctor. · Have someone take away sharp or dangerous objects, guns, and drugs from your home. · Keep the numbers for these national suicide hotlines: 5-342-304-TALK (6-621.588.5755) and 8-689-PADDLWW (2-514.205.4514). When should you call for help?   Call 911 anytime you think you may need emergency care. For example, call if:    · You feel you cannot stop from hurting yourself or someone else.   [facility] your doctor now or seek immediate medical care if:    · You have one or more warning signs of suicide. For example, call if:  ? You feel like giving away your possessions. ? You use illegal drugs or drink alcohol heavily. ? You talk or write about death. This may include writing suicide notes and talking about guns, knives, or pills. ? You start to spend a lot of time alone or spend more time alone than usual.     · You hear voices.     · You start acting in an aggressive way that's not normal for you.    Watch closely for changes in your health, and be sure to contact your doctor if you have any problems. Where can you learn more? Go to http://krystal-jenny.info/. Enter R446 in the search box to learn more about \"Suicidal Thoughts and Behavior: Care Instructions. \"  Current as of: September 11, 2018  Content Version: 11.9  © 5385-3266 [employer]. Care instructions adapted under license by [employer] (which disclaims liability or warranty for this information). If you have questions about a medical condition or this instruction, always ask your healthcare professional. [address] any warranty or liability for your use of this information.        ___________________________________________________________________________________________________________________________________

## 2019-02-16 NOTE — DISCHARGE SUMMARY
Pillo Name:  Lamine Jones 
MR#:   [id number] :  1960 ACCOUNT #:  [de-identified] ADMIT DATE:  2019 DISCHARGE DATE:  02/15/2019 IDENTIFYING DATA:  The patient presents as a 15-year-old single white male, resident 
of [place], [place], though he says most recently he had been living in [facility] and then in [address] [place] [address]. He was covered by [facility] Medicare Medicaid policy. BASIS FOR ADMISSION:  The patient is admitted and transferred from [facility] medical floor. The patient had been readmitted there for complaint of 
wound dehiscence after amputation of two further inches off of his left foot after 
amputation of all of the toes on 01/15/2019. The original amputation of the toes had 
occurred in 2018. Following the further amputation on 01/15/2019, the patient had 
been discharged to the streets from [facility] with diagnosis of osteomyelitis, 
right foot; wound infection after surgery; adjustment disorder with depressed mood 
and homelessness. Thereafter, he had superficial cut with a utility knife, not 
requiring any treatment, and presented to [facility] and 
was subsequently sent to [facility] psychiatric unit for several days. He 
apparently had said that he was in crisis because he did not have anywhere to stay, 
was not allowed to go to the [employer] or to the [facility] [address] [place] [address] O. He was also known to have had prior admissions at [facility] in 2018 for substance-induced mood disorder, suicidal ideas and had been 
on Effexor. He stayed in hospital several days continuing on the Effexor  mg 
daily. He was discharged and was supposed to have outpatient followup with community 
services but the patient says he could not go. He had been staying in various [facility]. He then presented to [facility] as noted above. When it came time for 
discharge and they were going to send him out, he had told them that he would be 
suicidal if they send him out. Instead of setting up followup elsewhere, they then 
made arrangements saying that he was suicidal and needed to come to this facility for 
threats of suicide. On arrival, he ended up saying he was not really suicidal, but rather felt the need 
for treatment for his medical complaints and since he was not going to get that, he 
wanted to be immediately discharged to go back to his motel room and do his 
outpatient followup as scheduled on Tuesday. The patient refused to give further information saying he did not want to waste his 
time staying around in a psychiatric facility that he did not need to be in. Medical history had to be attained from his past records since he would not give any 
further. He did have the history of peripheral artery disease, hypertension, 
osteomyelitis of the left foot with resection of the toes, history of atrial 
fibrillation, past GI bleed. He denied drug or food allergies. SUBSTANCE ABUSE HISTORY:  The patient would not give any history about this. SOCIAL/FAMILY HISTORY:  The patient would not give any history regarding this. HOSPITAL COURSE:  The patient was admitted to the locked special treatment unit where 
he was afforded individual, group, and milieu therapies. He did not wish to 
participate in anything saying he did not need psychiatric treatment and he had 
mainly expected that he would be getting evaluation for narcotics treatments and 
treatment for his foot. The patient was seen by the Wound Care nurse and dressing 
change was done as well as soaking. He was continued on antibiotic treatment with Keflex 500 mg 4 times a day and Bactrim DS 1 twice a day.   He continued on Abilify 5 
 mg daily, atorvastatin 40 mg daily, Plavix 75 mg daily, Santyl collagenase soaking as 
noted, gabapentin 100 mg 3 times a day, metoprolol XL 25 mg daily, multiple vitamins 
daily, nicotine patch 21 mg daily, pantoprazole 40 mg daily and venlafaxine  mg 
daily. He was denying hallucinatory or delusional material, homicidal or suicidal 
ideas. He was not responding to any internal stimuli. Mental status examination at 
the time of discharge revealed him to be an alert and oriented white male, who is 
quite irritable and irritated. Eye contact was fair. Speech was fluent. Mood was 
angry as noted with full affect. Thought processing was logical and goal directed. He denied hallucinatory or delusional material.  He was not responding to internal 
stimuli. IQ is estimated in the normal range. He denied homicidal or suicidal ideas 
and was not making any efforts to harm self or others. ASSESSMENT: 
AXIS I:  Major depression, recurrent, moderate. AXIS II:  Borderline personality features, rule out disorder. AXIS III:  Peripheral vascular disease, status post amputation left foot toes. Wound 
dehiscence and infection. Hypercholesterolemia. Hypertension. DISPOSITION:  Discharged to self against medical advice. The patient is insisting on 
discharge and does not meet any criteria for involuntary commitment. There is no 
evidence that he wishes to harm self or other, and said he wanted to hurt self so 
that he could force inpatient treatment for care of his foot which was offered to the 
patient, but he insisted he does not want this in a psychiatric facility. CONDITION ON DISCHARGE:  Fair. PROGNOSIS:  Guarded. Discharged to self. Follow up with Dr. Pedraza Lung this upcoming Tuesday. Follow up 
with [facility] for mental health treatment. Follow up 
with his vascular surgeon as scheduled.  
 
MEDICATIONS:  Aripiprazole 5 mg daily, atorvastatin 40 mg daily, cephalexin 500 mg 
capsule four times a day, #36, Plavix 75 mg daily, gabapentin 100 mg t.i.d., 
metoprolol XL 25 mg daily, multiple vitamins 1 daily, pantoprazole 40 mg daily, Bactrim DS 1 b.i.d., #18, venlafaxine  mg b.i.d. Michael Giraldo MD 
 
 
[id number] 
D:  02/15/2019 14:38 T:  02/16/2019 16:01 
JOB #:  [id number]

## 2019-02-18 NOTE — H&P
[address] [place] [address]  HISTORY AND PHYSICAL Name:  Tonya Peres 
MR#:   [id number] :  1960 ACCOUNT #:  [de-identified] ADMIT DATE:  2019 IDENTIFYING DATA:  The patient presents as a 60-year-old single white male, resident 
of [place], [place], though he says most recently he had been living in [facility] and then in [place], [place]. He was covered by [facility] Medicare Medicaid policy. BASIS FOR ADMISSION:  The patient is admitted and transferred from [facility] medical floor. The patient had been readmitted there for complaint of 
wound dehiscence after amputation of two further inches off of his left foot after 
amputation of all of the toes on 01/15/2019. The original amputation of the toes had 
occurred in 2018. Following the further amputation on 01/15/2019, the patient had 
been discharged to the streets from [facility] with diagnosis of osteomyelitis, 
right foot; wound infection after surgery; adjustment disorder with depressed mood 
and homelessness. Thereafter, he had superficial cut with a utility knife, not 
requiring any treatment, and presented to [facility] and 
was subsequently sent to [facility] psychiatric unit for several days. He 
apparently had said that he was not in a good spot because he did not have anywhere 
to stay, was not allowed to go to the [employer] or to the 
[facility]. He was also known to have had prior admissions at [place] [address] in 2018 for substance-induced mood disorder, suicidal ideas and 
had been on Effexor. He stayed in hospital several days continuing on the Effexor XR 
300 mg daily. He was discharged and was supposed to have outpatient followup with 
community services but the patient says he could not go. He had been staying in 
various hotels. He then presented to [facility] as noted above. When it came 
time for discharge and they were going to send him out, he had told them that he 
would be suicidal if they send him out. Instead of setting up followup elsewhere, 
they then made arrangements saying that he was suicidal and needed to come to this 
facility for threats of suicide. On arrival, he ended up saying he was not really suicidal, but rather felt the need 
for treatment for his medical complaints and since he was not going to get that, he 
wanted to be immediately discharged to go back to his motel room and do his 
outpatient followup as scheduled on Tuesday. The patient refused to give further information saying he did not want to waste his 
time staying around in a psychiatric facility that he did not need to be in. Medical history had to be attained from his past records since he would not give any 
further. He did have the history of peripheral artery disease, hypertension, 
osteomyelitis of the left foot with resection of the toes, history of atrial 
fibrillation, past GI bleed. He denied drug or food allergies. SUBSTANCE ABUSE HISTORY:  The patient would not give any history about this. SOCIAL/FAMILY HISTORY:  The patient would not give any history regarding this. HOSPITAL COURSE:  The patient was admitted to the [facility] special treatment unit where 
he was afforded individual, group, and milieu therapies. He did not wish to 
participate in anything saying he did not need psychiatric treatment and he had 
mainly expected that he would be getting evaluation for narcotics treatments and 
treatment for his foot. The patient was seen by the Wound Care nurse and dressing 
change was done as well as soaking. He was continued on antibiotic treatment with Keflex 500 mg 4 times a day and Bactrim DS 1 twice a day.   He continued on Abilify 5 
mg daily, atorvastatin 40 mg daily, Plavix 75 mg daily, Santyl collagenase soaking as 
 noted, gabapentin 100 mg 3 times a day, metoprolol XL 25 mg daily, multiple vitamins 
daily, nicotine patch 21 mg daily, pantoprazole 40 mg daily and venlafaxine  mg 
daily. He was denying hallucinatory or delusional material, homicidal or suicidal 
ideas. He was not responding to any internal stimuli. Mental status examination at 
the time of discharge revealed him to be an alert and oriented white male, who is 
quite irritable and irritated. Eye contact was fair. Speech was fluent. Mood was 
angry as noted with full affect. Thought processing was logical and goal directed. He denied hallucinatory or delusional material.  He was not responding to internal 
stimuli. IQ is estimated in the normal range. He denied homicidal or suicidal ideas 
and was not making any efforts to harm self or others. ASSESSMENT: 
AXIS I:  Major depression, recurrent, moderate. AXIS II:  Borderline personality features, rule out disorder. AXIS III:  Peripheral vascular disease, status post amputation left foot toes. Wound 
dehiscence and infection. Hypercholesterolemia. Hypertension. DISPOSITION:  Discharged to self against medical advice. The patient is insisting on 
discharge and does not meet any criteria for involuntary commitment. There is no 
evidence that he wishes to harm self or other, and said he wanted to hurt self so 
that he could force inpatient treatment for care of his foot which was offered to the 
patient, but he insisted he does not want this in a psychiatric facility. CONDITION ON DISCHARGE:  Fair. PROGNOSIS:  Guarded. Discharged to self. Follow up with Dr. Jordi Beasley this upcoming Tuesday. Follow up 
with [facility] for mental health treatment. Follow up 
with his vascular surgeon as scheduled.  
 
MEDICATIONS:  Aripiprazole 5 mg daily, atorvastatin 40 mg daily, cephalexin 500 mg 
capsule four times a day, #36, Plavix 75 mg daily, gabapentin 100 mg t.i.d., 
metoprolol XL 25 mg daily, multiple vitamins 1 daily, pantoprazole 40 mg daily, Bactrim DS 1 b.i.d., #18, venlafaxine  mg b.i.d. Kojo Chery MD 
 
 
[id number] 
D:  02/15/2019 14:38 T:  02/18/2019 15:14 
JOB #:  [id number]

## 2019-02-19 LAB
BACTERIA SPEC CULT: NORMAL
BACTERIA SPEC CULT: NORMAL
SERVICE CMNT-IMP: NORMAL
SERVICE CMNT-IMP: NORMAL

## 2019-02-20 LAB
ACID FAST STN SPEC: NEGATIVE
MYCOBACTERIUM SPEC QL CULT: NEGATIVE
SPECIMEN PREPARATION: NORMAL
SPECIMEN SOURCE: NORMAL

## 2019-06-18 ENCOUNTER — HOSPITAL ENCOUNTER (INPATIENT)
Age: 59
LOS: 1 days | Discharge: HOME | DRG: 751 | End: 2019-06-19
Attending: EMERGENCY MEDICINE | Admitting: PSYCHIATRY & NEUROLOGY
Payer: MEDICAID

## 2019-06-18 DIAGNOSIS — F33.1 MAJOR DEPRESSIVE DISORDER, RECURRENT EPISODE, MODERATE (HCC): Primary | Chronic | ICD-10-CM

## 2019-06-18 LAB
ALBUMIN SERPL-MCNC: 3.7 G/DL (ref 3.4–5)
ALBUMIN/GLOB SERPL: 0.9 {RATIO} (ref 0.8–1.7)
ALP SERPL-CCNC: 90 U/L (ref 45–117)
ALT SERPL-CCNC: 21 U/L (ref 16–61)
AMPHET UR QL SCN: NEGATIVE
ANION GAP SERPL CALC-SCNC: 4 MMOL/L (ref 3–18)
AST SERPL-CCNC: 20 U/L (ref 15–37)
BARBITURATES UR QL SCN: NEGATIVE
BASOPHILS # BLD: 0.1 K/UL (ref 0–0.06)
BASOPHILS NFR BLD: 1 % (ref 0–3)
BENZODIAZ UR QL: NEGATIVE
BILIRUB SERPL-MCNC: 0.2 MG/DL (ref 0.2–1)
BUN SERPL-MCNC: 21 MG/DL (ref 7–18)
BUN/CREAT SERPL: 22 (ref 12–20)
CALCIUM SERPL-MCNC: 8.9 MG/DL (ref 8.5–10.1)
CANNABINOIDS UR QL SCN: POSITIVE
CHLORIDE SERPL-SCNC: 108 MMOL/L (ref 100–108)
CO2 SERPL-SCNC: 28 MMOL/L (ref 21–32)
COCAINE UR QL SCN: NEGATIVE
CREAT SERPL-MCNC: 0.97 MG/DL (ref 0.6–1.3)
DIFFERENTIAL METHOD BLD: ABNORMAL
EOSINOPHIL # BLD: 1 K/UL (ref 0–0.4)
EOSINOPHIL NFR BLD: 10 % (ref 0–5)
ERYTHROCYTE [DISTWIDTH] IN BLOOD BY AUTOMATED COUNT: 18.7 % (ref 11.6–14.5)
ETHANOL SERPL-MCNC: 4 MG/DL (ref 0–3)
GLOBULIN SER CALC-MCNC: 3.9 G/DL (ref 2–4)
GLUCOSE SERPL-MCNC: 102 MG/DL (ref 74–99)
HCT VFR BLD AUTO: 38.5 % (ref 36–48)
HDSCOM,HDSCOM: ABNORMAL
HEMOCCULT STL QL: NEGATIVE
HGB BLD-MCNC: 13.2 G/DL (ref 13–16)
INR PPP: 1 (ref 0.8–1.2)
LYMPHOCYTES # BLD: 2.3 K/UL (ref 0.8–3.5)
LYMPHOCYTES NFR BLD: 24 % (ref 20–51)
MCH RBC QN AUTO: 29.5 PG (ref 24–34)
MCHC RBC AUTO-ENTMCNC: 34.3 G/DL (ref 31–37)
MCV RBC AUTO: 86.1 FL (ref 74–97)
METHADONE UR QL: NEGATIVE
MONOCYTES # BLD: 0.7 K/UL (ref 0–1)
MONOCYTES NFR BLD: 7 % (ref 2–9)
NEUTS SEG # BLD: 5.4 K/UL (ref 1.8–8)
NEUTS SEG NFR BLD: 58 % (ref 42–75)
OPIATES UR QL: NEGATIVE
PCP UR QL: NEGATIVE
PLATELET # BLD AUTO: 226 K/UL (ref 135–420)
PLATELET COMMENTS,PCOM: ABNORMAL
PMV BLD AUTO: 9.8 FL (ref 9.2–11.8)
POTASSIUM SERPL-SCNC: 4.3 MMOL/L (ref 3.5–5.5)
PROT SERPL-MCNC: 7.6 G/DL (ref 6.4–8.2)
PROTHROMBIN TIME: 13.1 SEC (ref 11.5–15.2)
RBC # BLD AUTO: 4.47 M/UL (ref 4.7–5.5)
RBC MORPH BLD: ABNORMAL
SODIUM SERPL-SCNC: 140 MMOL/L (ref 136–145)
WBC # BLD AUTO: 9.5 K/UL (ref 4.6–13.2)

## 2019-06-18 PROCEDURE — 85610 PROTHROMBIN TIME: CPT

## 2019-06-18 PROCEDURE — 99285 EMERGENCY DEPT VISIT HI MDM: CPT

## 2019-06-18 PROCEDURE — 85025 COMPLETE CBC W/AUTO DIFF WBC: CPT

## 2019-06-18 PROCEDURE — 80053 COMPREHEN METABOLIC PANEL: CPT

## 2019-06-18 PROCEDURE — 82272 OCCULT BLD FECES 1-3 TESTS: CPT

## 2019-06-18 PROCEDURE — 74011250637 HC RX REV CODE- 250/637: Performed by: PSYCHIATRY & NEUROLOGY

## 2019-06-18 PROCEDURE — 80307 DRUG TEST PRSMV CHEM ANLYZR: CPT

## 2019-06-18 PROCEDURE — 65220000005 HC RM SEMIPRIVATE PSYCH 3 OR 4 BED

## 2019-06-18 RX ORDER — TRAZODONE HYDROCHLORIDE 50 MG/1
50 TABLET ORAL
Status: DISCONTINUED | OUTPATIENT
Start: 2019-06-18 | End: 2019-06-19 | Stop reason: HOSPADM

## 2019-06-18 RX ORDER — VENLAFAXINE HYDROCHLORIDE 75 MG/1
150 CAPSULE, EXTENDED RELEASE ORAL
Status: DISCONTINUED | OUTPATIENT
Start: 2019-06-19 | End: 2019-06-19

## 2019-06-18 RX ORDER — ATORVASTATIN CALCIUM 20 MG/1
40 TABLET, FILM COATED ORAL
Status: DISCONTINUED | OUTPATIENT
Start: 2019-06-18 | End: 2019-06-19 | Stop reason: HOSPADM

## 2019-06-18 RX ORDER — PANTOPRAZOLE SODIUM 40 MG/1
40 TABLET, DELAYED RELEASE ORAL
Status: DISCONTINUED | OUTPATIENT
Start: 2019-06-19 | End: 2019-06-19 | Stop reason: HOSPADM

## 2019-06-18 RX ORDER — CLOPIDOGREL BISULFATE 75 MG/1
75 TABLET ORAL DAILY
Status: DISCONTINUED | OUTPATIENT
Start: 2019-06-19 | End: 2019-06-19 | Stop reason: HOSPADM

## 2019-06-18 RX ORDER — ATORVASTATIN CALCIUM 40 MG/1
40 TABLET, FILM COATED ORAL
Status: DISCONTINUED | OUTPATIENT
Start: 2019-06-18 | End: 2019-06-18 | Stop reason: SDUPTHER

## 2019-06-18 RX ORDER — GABAPENTIN 400 MG/1
800 CAPSULE ORAL 3 TIMES DAILY
Status: DISCONTINUED | OUTPATIENT
Start: 2019-06-18 | End: 2019-06-19 | Stop reason: HOSPADM

## 2019-06-18 RX ORDER — HYDROXYZINE PAMOATE 50 MG/1
50 CAPSULE ORAL
Status: DISCONTINUED | OUTPATIENT
Start: 2019-06-18 | End: 2019-06-19 | Stop reason: HOSPADM

## 2019-06-18 RX ORDER — METOPROLOL SUCCINATE 25 MG/1
25 TABLET, EXTENDED RELEASE ORAL DAILY
Status: DISCONTINUED | OUTPATIENT
Start: 2019-06-19 | End: 2019-06-19 | Stop reason: HOSPADM

## 2019-06-18 RX ORDER — ARIPIPRAZOLE 15 MG/1
15 TABLET ORAL DAILY
Status: DISCONTINUED | OUTPATIENT
Start: 2019-06-19 | End: 2019-06-19 | Stop reason: HOSPADM

## 2019-06-18 RX ADMIN — GABAPENTIN 800 MG: 400 CAPSULE ORAL at 22:35

## 2019-06-18 NOTE — ED TRIAGE NOTES
Patient arrived to the ER for complaints of diarrhea X 4 days and blood in stool X 2 days. Patient also complains of anxiety and depression for the past week.

## 2019-06-18 NOTE — ED NOTES
Bedside shift change report given to Mukul Isidro RN (oncoming nurse) by Benedicto Farley RN (offgoing nurse). Report included the following information SBAR, ED Summary, Procedure Summary and Recent Results.

## 2019-06-18 NOTE — BSMART NOTE
Comprehensive Assessment Form Part 1 Disposition Discussed with Dr. Morgan Diane, the plan is admit. The on-call Psychiatrist consulted was Dr. Erika Small. The admitting Psychiatrist will be Dr. Erika Small. The admitting Diagnosis is Depression. Admitted to room 106-4 Unit STU1 Integrated Summary Patient is a 63year old male who presented to the emergency room with c/o \"blood in my stool, my foot was hurting, and I'm depressed with suicidal thoughts. ..tired of living. \" Patient Patient also stated that he is almost homeless. Kaitlyn Go I rent a room from [employer] and they don't keep the place up. I really don't like living there. \" Patient has a dressing intact to left foot, \"toes amputated in January. Kaitlyn Go I got PAD. \" Mental Status Exam 
 
The patient's appearance shows no evidence of impairment. The patient's behavior shows no evidence of impairment. The patient is oriented to time, place, person and situation. The patient's speech shows no evidence of impairment. The patient's mood is depressed. The range of affect is flat. The patient's thought content demonstrates no evidence of impairment. The thought process shows no evidence of impairment. The patient's perception shows no evidence of impairment. The patient's memory shows no evidence of impairment. The patient's appetite shows no evidence of impairment. The patient's sleep, \" difficulty sleeping. ..4 hrs/night. \" Inpatient: [facility], [facility] Outpatient: None Legal Issues: Denied Contact/Support Person: Garrett Cordero, mother, [phone]\" The patient is deemed competent to provide informed consent. The Chief Complaint is \"depressed, suicidal thoughts. \" The Precipitant Factors are chronicity, almost homeless, marijuana use. Substance Abuse The patient is using substances. The patient is using cannabis for greater than 10 years by inhalation with last use on \"6/18/19. \" BAL=4, but denied use of alcoholic beverages. Patient is willing for voluntary admission for treatment at [facility]; discussed with Dr. Luis Miguel Sousa, admission orders received; report called to charge nurse.

## 2019-06-18 NOTE — ED PROVIDER NOTES
EMERGENCY DEPARTMENT HISTORY AND PHYSICAL EXAM  This was created with voice recognition software and transcription errors may be present. 9:41 AM  Date: 6/18/2019  Patient Name: Marcia Zee    History of Presenting Illness     Chief Complaint:    History Provided By:     HPI: Marcia Zee is a 63 y.o. male with a past medical history of depression, hypertension, peripheral artery disease status post transmetatarsal amputation of his left foot on Plavix who presents with lower GI bleeding x2 days. Patient describes dark stool with blood evident with wiping. No abdominal pain. No weakness. This is associated with feeling depressed and suicidal.  Patient states that he is 63years old and homeless and that is causing him great distress. No other aggravating or alleviating factors. No other associated symptoms. PCP: Alber Oliver MD      Past History     Past Medical History:  Past Medical History:   Diagnosis Date    Calculus of kidney     Depression     Hypertension     Sleep disorder     Vascular disorder of lower extremity     bilateral       Past Surgical History:  Past Surgical History:   Procedure Laterality Date    HX AMPUTATION TOE Left     x 5 toes    HX HEENT      VASCULAR SURGERY PROCEDURE UNLIST         Family History:  Family History   Problem Relation Age of Onset    Arthritis-osteo Mother     Diabetes Father     Heart Disease Father     Melanoma Father     Psychiatric Disorder Brother        Social History:  Social History     Tobacco Use    Smoking status: Current Every Day Smoker     Packs/day: 0.25    Smokeless tobacco: Never Used   Substance Use Topics    Alcohol use: Yes    Drug use: Yes     Frequency: 1.0 times per week     Types: Marijuana     Comment: rarely       Allergies:  No Known Allergies    Review of Systems     Review of Systems   Constitutional: Negative for activity change and appetite change. Gastrointestinal: Positive for blood in stool. Negative for constipation. 10 point review of systems otherwise negative unless noted in HPI. Physical Exam       Physical Exam   Constitutional: He is oriented to person, place, and time. He appears well-developed. HENT:   Head: Normocephalic and atraumatic. Eyes: Pupils are equal, round, and reactive to light. EOM are normal.   Neck: Normal range of motion. Neck supple. Cardiovascular: Normal rate, regular rhythm and normal heart sounds. Exam reveals no friction rub. No murmur heard. Pulmonary/Chest: Effort normal and breath sounds normal. No respiratory distress. He has no wheezes. Abdominal: Soft. He exhibits no distension. There is no tenderness. There is no rebound and no guarding. Musculoskeletal: Normal range of motion. Neurological: He is alert and oriented to person, place, and time. Skin: Skin is warm and dry. Psychiatric: He has a normal mood and affect. His behavior is normal. Thought content normal.       Diagnostic Study Results     Vital Signs   Visit Vitals  /73 (BP 1 Location: Left arm, BP Patient Position: Sitting)   Pulse 67   Temp 97 °F (36.1 °C)   Resp 16   Ht 6' 2\" (1.88 m)   Wt 79.4 kg (175 lb)   SpO2 97%   BMI 22.47 kg/m²       EKG: none  Labs: H&H shows 13 and 38, chemistries unremarkable  Imaging:     Medical Decision Making     ED Course: Progress Notes, Reevaluation, and Consults:      Provider Notes (Medical Decision Making): 52-year-old gentleman presents with lower GI bleeding, painless x2 days. Patient is on Plavix for PAD. Check H&H, terminate the need for medical admission exists if not refer for crisis evaluation. On rectal exam there was brown stool no blood. If Hemoccult negative likely okay for crisis evaluation    Dw/ GI; agree out pt follow up unless sx worsen      Asked by crisis to evaluate the patient's foot. He has a prior TMA. He has a small amount of dead skin at the end of the stump. There is no erythema or induration.   Small amount of bleeding. No evidence of infection. Patient states it is hurt constantly for the past 15 months, symptoms unchanged. He does say he put off a little bit of dead skin the other day    Turned over to Dr. Arianna Tolbert. Diagnosis     Clinical Impression: No diagnosis found. Disposition:    Patient's Medications   Start Taking    No medications on file   Continue Taking    ARIPIPRAZOLE (ABILIFY) 15 MG TABLET    Take 1 Tab by mouth daily. ASPIRIN (ASPIRIN) 325 MG TABLET    325 mg. ATORVASTATIN (LIPITOR) 40 MG TABLET    40 mg.    CEPHALEXIN (KEFLEX) 500 MG CAPSULE    Take 1 Cap by mouth every six (6) hours. CLOPIDOGREL (PLAVIX) 75 MG TAB    Take 1 Tab by mouth daily. GABAPENTIN (NEURONTIN) 100 MG CAPSULE    Take 1 Cap by mouth three (3) times daily. METOPROLOL SUCCINATE (TOPROL-XL) 25 MG XL TABLET    Take 1 Tab by mouth daily. PANTOPRAZOLE (PROTONIX) 40 MG TABLET    Take 1 Tab by mouth daily. THERAPEUTIC MULTIVITAMIN-MINERALS (THERAGRAN-M) TABLET    Take 1 Tab by Mouth Once a Day. TRIMETHOPRIM-SULFAMETHOXAZOLE (BACTRIM DS, SEPTRA DS) 160-800 MG PER TABLET    Take 1 Tab by mouth every twelve (12) hours. VENLAFAXINE-SR (EFFEXOR-XR) 150 MG CAPSULE    Take 1 Cap by mouth daily.  Indications: ANXIETY WITH DEPRESSION   These Medications have changed    No medications on file   Stop Taking    No medications on file

## 2019-06-19 VITALS
TEMPERATURE: 96.5 F | BODY MASS INDEX: 22.46 KG/M2 | HEIGHT: 74 IN | HEART RATE: 67 BPM | RESPIRATION RATE: 16 BRPM | WEIGHT: 175 LBS | OXYGEN SATURATION: 98 % | SYSTOLIC BLOOD PRESSURE: 136 MMHG | DIASTOLIC BLOOD PRESSURE: 87 MMHG

## 2019-06-19 PROBLEM — F33.1 MAJOR DEPRESSIVE DISORDER, RECURRENT EPISODE, MODERATE (HCC): Chronic | Status: ACTIVE | Noted: 2019-02-15

## 2019-06-19 PROCEDURE — 74011250637 HC RX REV CODE- 250/637: Performed by: PSYCHIATRY & NEUROLOGY

## 2019-06-19 RX ORDER — VENLAFAXINE HYDROCHLORIDE 150 MG/1
150 CAPSULE, EXTENDED RELEASE ORAL
Qty: 15 CAP | Refills: 1 | Status: SHIPPED | OUTPATIENT
Start: 2019-06-19 | End: 2019-06-19

## 2019-06-19 RX ORDER — VENLAFAXINE HYDROCHLORIDE 150 MG/1
150 CAPSULE, EXTENDED RELEASE ORAL 2 TIMES DAILY
Qty: 30 CAP | Refills: 1 | Status: SHIPPED | OUTPATIENT
Start: 2019-06-19

## 2019-06-19 RX ORDER — VENLAFAXINE HYDROCHLORIDE 150 MG/1
150 CAPSULE, EXTENDED RELEASE ORAL 2 TIMES DAILY
Status: DISCONTINUED | OUTPATIENT
Start: 2019-06-19 | End: 2019-06-19 | Stop reason: HOSPADM

## 2019-06-19 RX ORDER — ARIPIPRAZOLE 15 MG/1
15 TABLET ORAL DAILY
Qty: 15 TAB | Refills: 1 | Status: SHIPPED | OUTPATIENT
Start: 2019-06-19

## 2019-06-19 RX ORDER — VENLAFAXINE HYDROCHLORIDE 150 MG/1
150 CAPSULE, EXTENDED RELEASE ORAL
Status: DISCONTINUED | OUTPATIENT
Start: 2019-06-19 | End: 2019-06-19

## 2019-06-19 RX ADMIN — MULTIPLE VITAMINS W/ MINERALS TAB 1 TABLET: TAB at 08:14

## 2019-06-19 RX ADMIN — ARIPIPRAZOLE 15 MG: 15 TABLET ORAL at 08:14

## 2019-06-19 RX ADMIN — GABAPENTIN 800 MG: 400 CAPSULE ORAL at 08:15

## 2019-06-19 RX ADMIN — METOPROLOL SUCCINATE 25 MG: 25 TABLET, EXTENDED RELEASE ORAL at 08:15

## 2019-06-19 RX ADMIN — PANTOPRAZOLE SODIUM 40 MG: 40 TABLET, DELAYED RELEASE ORAL at 06:34

## 2019-06-19 RX ADMIN — VENLAFAXINE HYDROCHLORIDE 150 MG: 150 CAPSULE, EXTENDED RELEASE ORAL at 11:42

## 2019-06-19 RX ADMIN — CLOPIDOGREL BISULFATE 75 MG: 75 TABLET ORAL at 08:14

## 2019-06-19 NOTE — BH NOTES
Treatment team met -     Medical Director:                                x_____present        Psychiatrist:                                        x_____present      Charge nurse:                                     x_____present           MSW:                                                _x____present      :                      _x____present      Nurse Manager:                                  x_____present      Student RNs:                                      _____present      Medical Students:                               _____present      Art Therapist:                                      x_____present   Clinical Coordinator:                           x_____present   Internal :                      _x____present        Plan of care discussed and updated as appropriate.

## 2019-06-19 NOTE — DISCHARGE INSTRUCTIONS
BEHAVIORAL HEALTH NURSING DISCHARGE NOTE      The following personal items collected during your admission are returned to you:   Dental Appliance: Dental Appliances: None  Vision: Visual Aid: None  Hearing Aid:    Jewelry: Jewelry: None  Clothing: Clothing: Footwear, Shirt, Shorts  Other Valuables: Other Valuables: Cell Phone  Valuables sent to safe: Personal Items Sent to Safe: 3 ID, 2 CC, 1 EBT, health card      PATIENT INSTRUCTIONS:    Your health and safety is very important to us; we remind you to commit to safety and recovery. Should you have any thoughts of harming yourself or others please dial 911, utilize your support systems, the coping strategies you have learned as well as the [address] [place] [address] at [phone] or visit their website at https://suicidepreventionlifeline.org/    The following are some additional coping strategies that you can utilize if you start to feel anxious, angry, stressed or depressed    1. Exercise (running, walking, etc.). 2. Write (poetry, stories, journal). 3. Be with other people. 4. Watch a favorite TV show. 5. Practice Mindfulness  6. Watch a funny/favorite movie  7. Do some deep breathing  8. Take a hot shower or relaxing bath. 9. Play with a pet. 10. Help others  11. Read a good book. 12. Listen to music. 13. Try some aromatherapy (candle, lotion, room spray). 14. Meditate. 15. Paint or draw. 16. Rip paper into itty-bitty pieces. 17. Shoot hoops, kick a ball. 18. Hug a pillow or stuffed animal.  19. Dance. 20. Take up a new hobby. 21. [place]. 25. Make a list of blessings in your life. 23. Contact a hotline/ your therapist.  24. Talk to someone close to you. 25. Yoga. 32. Logan Tapiae a friend or family member. 32. Make a list of goals for the week/month/year/5 years. The discharge information has been reviewed with the patient. The patient verbalized understanding.       Patient armband removed and shredded      ***IMPORTANT NUMBERS***        [address] [facility]        [phone]    [facility] \[facility]\""       [phone]    Suicide Prevention     [phone]

## 2019-06-19 NOTE — BSMART NOTE
ARIS assessment/Intervention:  Patient is prepared for discharge. Patient denies SI/HI. Patient denies AVH. Patient has services with [facility] mental Health Support who will assist with services.  
 
MARCY Foss
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
## 2019-06-19 NOTE — BH NOTES
GROUP THERAPY PROGRESS NOTE    Julesjeff Bosophia is participating in Goals Group.      Group time: 30 minutes    Personal goal for participation: Be positive and stay warm    Goal orientation: personal      Therapeutic interventions reviewed and discussed: Setting personal goals and plans to achieve them

## 2019-06-20 NOTE — DISCHARGE SUMMARY
[facility]    Name:  Hetal Field  MR#:   [id number]  :  1960  ACCOUNT #:  [de-identified]  ADMIT DATE:  2019  DISCHARGE DATE:  2019      SHORT STAY SUMMARY    IDENTIFYING DATA:  The patient is a 71-year-old single white male who says he was living in a rooming house in [place], [place], having recently been set up there by his support services people at [facility]. He is covered by [facility] Medicaid. BASIS FOR ADMISSION:  The patient is admitted after he self presented to the emergency room saying that he was feeling depressed and suicidal.  He had previously been homeless and was recently set up with a boarding house. He has gone repeatedly to psychiatric hospitals over the last several months as he has been homeless and has been using the psychiatric facilities for his mental health followups. He says that he is almost out of his Effexor and needs a refill. He also said that he had been on clonazepam and wanted to get back on it. The patient had been at [facility] about 2 weeks ago and got a prescription at that time. He was supposed to go to the [facility] for an intake and did not go. The patient had been at this facility under my care for a 1-day hospital stay on 2019. He had been transferred from [facility] medical [facility] showing up there for wound dehiscence after amputation of two further inches off his foot on 01/15/2019, because of his distal artery disease. He had a diagnosis of osteomyelitis. He had recently been in [facility] Room and was sent to [address] [place] [address] for several days. He had not been allowed to go into shelters because of his foot injury. He is known to have had a prior admission also to [facility] in 2018, for a substance induced mood disorder and was on Effexor at that time.   He had been going from hotel to [facility]. He had arrived and said that he did not really want to be in a psychiatric hospital, but rather was trying to pressure them to give him further treatment for his foot injury. He was denying homicidal or suicidal ideas, hallucinations or delusions or psychosis and immediately wished to be discharged upon arrival to the psychiatric hospital.  He was diagnosed with major depression, recurrent, moderate and borderline personality disorder. He was discharged on Abilify 5 mg daily, gabapentin 100 mg t.i.d., venlafaxine  mg b.i.d. At the current time, the patient is again denying homicidal or suicidal ideas saying that he was somewhat upset out in the community and so wanted to come to the hospital to be again reevaluated and to have his prescriptions refilled. He was denying hallucinations or delusions. He denied homicidal or suicidal ideas. Again, he did not wish to be in a psychiatric hospital.    PAST MEDICAL HISTORY:  Significant for his history of peripheral artery disease, hypertension, osteomyelitis of the left foot with resection, history of atrial fibrillation, history of GI bleed. He did tell the emergency room staff that he had recent rectal bleeding and had nothing found on workup. ALLERGIES:  HE DENIED FOOD OR DRUG ALLERGIES. Laboratory testing done in the emergency room included a normal CBC, normal INR, normal PT, normal chemistry and electrolyte profile, urine drug screen positive for cannabis, negative alcohol level. Physical examination in the emergency room was described as normal, although he did have the left foot amputation. He had complained of blood in stool, but nothing was found on examination. SUBSTANCE ABUSE HISTORY:  The patient denied currently drug, alcohol or tobacco abuse. SOCIAL AND FAMILY HISTORY:  The patient has been in a rooming house.   He now says that he has a friend with whom he will stay as soon the friend gets a house.  He did not give other social history. HOSPITAL COURSE:  The patient was admitted to the locked special treatment unit where he was afforded individual, group and milieu therapies. In the hospital, he was treated with Abilify 15 mg daily, Plavix 75 mg daily, gabapentin 800 mg three times a day, metoprolol 25 mg daily, multiple vitamins with iron daily, Protonix 40 mg daily and did not require other medicines. As noted, the mood has improved very quickly in the structure of the hospital and he was denying homicidal or suicidal ideas, hallucinatory or delusional material.  He did let us know that he was here to get his refills and to have a routine checkup. CONDITION ON DISCHARGE:   Fair. PROGNOSIS:  Guarded. He has a history of poor compliance. ASSESSMENT:  AXIS I:  Major depression, recurrent, moderate. AXIS II:  Borderline personality disorder, severe. AXIS III:  Peripheral artery disease, status post amputation of toes, left foot. Hypercholesterolemia. Hypertension. DISPOSITION:  Discharged to [facility]. Followup referral to [facility]. Follow up with own primary care provider. MEDICATIONS:  Abilify 15 mg one daily, #15, one refill; atorvastatin 40 mg daily; Plavix 75 mg daily; gabapentin 800 mg three times a day; metoprolol XL 25 mg daily; multiple vitamins with iron daily; Protonix 40 mg daily; venlafaxine  mg capsules one b.i.d., #30, one refill.         Thor Coats MD      [id number]  D:  06/19/2019 11:45  T:  06/19/2019 11:54  JOB #:  [id number]

## 2021-08-03 PROBLEM — I73.9 PVD (PERIPHERAL VASCULAR DISEASE) (HCC): Status: RESOLVED | Noted: 2018-05-18 | Resolved: 2021-08-03

## (undated) DEVICE — SWAB LIQ AMIES MAX V + CULTURESWAB

## (undated) DEVICE — BASIN EMESIS 500CC ROSE 250/CS 60/PLT: Brand: MEDEGEN MEDICAL PRODUCTS, LLC

## (undated) DEVICE — SOLUTION SCRB 4OZ 10% PVP I POVIDONE IOD TOP PAINT EXIDINE

## (undated) DEVICE — MEDI-VAC NON-CONDUCTIVE SUCTION TUBING: Brand: CARDINAL HEALTH

## (undated) DEVICE — SSC BONE WAX: Brand: SSC BONE WAX

## (undated) DEVICE — 10 FRENCH DRAIN SYSTEM, STERILE: Brand: TLS

## (undated) DEVICE — DISPOSABLE TOURNIQUET CUFF SINGLE BLADDER, SINGLE PORT AND QUICK CONNECT CONNECTOR: Brand: COLOR CUFF

## (undated) DEVICE — SOLUTION IV 1000ML 0.9% SOD CHL

## (undated) DEVICE — BNDG CMPR ELAS KNT VEL STD 4IN -- MEDICHOICE

## (undated) DEVICE — SYR 10ML CTRL LR LCK NSAF LF --

## (undated) DEVICE — X-RAY DETECTABLE SPONGES,12 PLY: Brand: VISTEC

## (undated) DEVICE — KIT COLON W/ 1.1OZ LUB AND 2 END

## (undated) DEVICE — FLEX ADVANTAGE 1500CC: Brand: FLEX ADVANTAGE

## (undated) DEVICE — HANDPIECE SET WITH SOFT TISSUE TIP AND SUCTION TUBE: Brand: INTERPULSE

## (undated) DEVICE — ZIMMER® STERILE DISPOSABLE TOURNIQUET CUFF WITH PLC, SINGLE PORT, SINGLE BLADDER, 18 IN. (46 CM)

## (undated) DEVICE — ABDOMINAL PAD: Brand: DERMACEA

## (undated) DEVICE — PREP SKN CHLRAPRP 26ML TNT -- CONVERT TO ITEM 373320

## (undated) DEVICE — BLADE SAW OSC COARSE 25X9MM --

## (undated) DEVICE — CONVERTORS STOCKINETTE: Brand: CONVERTORS

## (undated) DEVICE — STOCKINETTE IMPERV REG 9X48IN --

## (undated) DEVICE — MEDI-VAC SUCTION HANDLE REGULAR CAPACITY: Brand: CARDINAL HEALTH

## (undated) DEVICE — OCCLUSIVE GAUZE STRIP,3% BISMUTH TRIBROMOPHENATE IN PETROLATUM BLEND: Brand: XEROFORM

## (undated) DEVICE — BANDAGE ROLL,100% COTTON, 8 PLY, LARGE: Brand: KERLIX

## (undated) DEVICE — X-RAY SPONGES,12 PLY: Brand: DERMACEA

## (undated) DEVICE — NEEDLE HYPO 25GA L1.5IN BLU POLYPR HUB S STL REG BVL STR

## (undated) DEVICE — BITE BLK ENDOSCP AD 54FR GRN POLYETH ENDOSCP W STRP SLD

## (undated) DEVICE — SYR 50ML SLIP TIP NSAF LF STRL --

## (undated) DEVICE — KIT PROC EXTRM HND FT CUST LF --

## (undated) DEVICE — SUTURE S STL SZ 2-0 L18IN NONABSORBABLE TIE MFIL 600 PER BX DS28

## (undated) DEVICE — BANDAGE COMPR EXSANGUATION SGL LAYERED NO CLSR 9FT LEN 4IN W

## (undated) DEVICE — INTENDED FOR TISSUE SEPARATION, AND OTHER PROCEDURES THAT REQUIRE A SHARP SURGICAL BLADE TO PUNCTURE OR CUT.: Brand: BARD-PARKER ® CARBON RIB-BACK BLADES

## (undated) DEVICE — KENDALL 500 SERIES DIAPHORETIC FOAM MONITORING ELECTRODE - TEAR DROP SHAPE ( 30/PK): Brand: KENDALL